# Patient Record
Sex: FEMALE | Race: WHITE | NOT HISPANIC OR LATINO | Employment: OTHER | URBAN - NONMETROPOLITAN AREA
[De-identification: names, ages, dates, MRNs, and addresses within clinical notes are randomized per-mention and may not be internally consistent; named-entity substitution may affect disease eponyms.]

---

## 2020-08-31 ENCOUNTER — OFFICE VISIT (OUTPATIENT)
Dept: CARDIOLOGY | Facility: CLINIC | Age: 75
End: 2020-08-31

## 2020-08-31 VITALS
OXYGEN SATURATION: 98 % | TEMPERATURE: 97.1 F | HEART RATE: 98 BPM | WEIGHT: 165 LBS | HEIGHT: 60 IN | BODY MASS INDEX: 32.39 KG/M2 | SYSTOLIC BLOOD PRESSURE: 143 MMHG | DIASTOLIC BLOOD PRESSURE: 87 MMHG

## 2020-08-31 DIAGNOSIS — I25.10 CORONARY ARTERY DISEASE INVOLVING NATIVE CORONARY ARTERY OF NATIVE HEART WITHOUT ANGINA PECTORIS: ICD-10-CM

## 2020-08-31 DIAGNOSIS — I34.0 MITRAL VALVE INSUFFICIENCY, UNSPECIFIED ETIOLOGY: ICD-10-CM

## 2020-08-31 DIAGNOSIS — I49.3 PVC'S (PREMATURE VENTRICULAR CONTRACTIONS): ICD-10-CM

## 2020-08-31 DIAGNOSIS — R00.2 PALPITATIONS: ICD-10-CM

## 2020-08-31 DIAGNOSIS — I10 ESSENTIAL HYPERTENSION: Primary | ICD-10-CM

## 2020-08-31 PROCEDURE — 93000 ELECTROCARDIOGRAM COMPLETE: CPT | Performed by: PHYSICIAN ASSISTANT

## 2020-08-31 PROCEDURE — 99204 OFFICE O/P NEW MOD 45 MIN: CPT | Performed by: PHYSICIAN ASSISTANT

## 2020-08-31 RX ORDER — METOPROLOL SUCCINATE 25 MG/1
25 TABLET, EXTENDED RELEASE ORAL DAILY
Qty: 30 TABLET | Refills: 11 | Status: SHIPPED | OUTPATIENT
Start: 2020-08-31 | End: 2021-04-19 | Stop reason: SDUPTHER

## 2020-08-31 RX ORDER — MELATONIN
1000 DAILY
COMMUNITY

## 2020-08-31 RX ORDER — POTASSIUM CHLORIDE 750 MG/1
1 TABLET, FILM COATED, EXTENDED RELEASE ORAL DAILY
COMMUNITY
Start: 2020-08-01

## 2020-08-31 RX ORDER — NIFEDIPINE 30 MG/1
1 TABLET, EXTENDED RELEASE ORAL DAILY
COMMUNITY
Start: 2020-08-24 | End: 2021-09-09

## 2020-08-31 RX ORDER — LORATADINE 10 MG/1
10 TABLET ORAL DAILY
COMMUNITY

## 2020-08-31 RX ORDER — ATORVASTATIN CALCIUM 20 MG/1
1 TABLET, FILM COATED ORAL DAILY
COMMUNITY
Start: 2020-08-01

## 2020-08-31 RX ORDER — MONTELUKAST SODIUM 10 MG/1
1 TABLET ORAL NIGHTLY
COMMUNITY
Start: 2020-08-24

## 2020-08-31 RX ORDER — LEVOTHYROXINE SODIUM 0.03 MG/1
1 TABLET ORAL DAILY
COMMUNITY
Start: 2020-08-01 | End: 2021-08-31

## 2020-08-31 NOTE — PROGRESS NOTES
"Problem list     Subjective   Munira Escobar is a 75 y.o. female     Chief Complaint   Patient presents with   • Establish Care     Here to re-establish care    Problem list  1.  Preserved systolic function  1.1 echocardiogram October 2019 with preserved LV function  2.  Nonobstructive coronary artery disease by catheterization March 2020 by Dr. Go with minimal irregularities and almost normal coronaries with preserved LV function  3.  Mild to moderate mild regurgitation by echocardiogram October 2019  4.  Dyslipidemia  5.  Palpitations  5.1 event monitor in 2019 with no records available  5.2 EKG performed August 31, 2020 suggest frequent PVCs  6.  Hypertension  7.  Hypothyroidism    HPI    Patient is a 75-year-old female that presents to the office for evaluation.  Patient was seen last in approximately 2016.  Work-up at that time included stress test which was normal.  She had good LV function.  Last year around October, she started having issues with palpitations and symptoms went to the hospital.  She describes having heart rates in the \"30s\".  She was symptomatic and went to the hospital and was admitted.  She was monitored overnight.  Echo was normal.    She followed up with her bracing well.  She described wearing an event monitor.  Apparently \"this was lost\".  They do not have record of it and she has been unsuccessful in getting any records from the monitor.  Patient eventually had a cardiac catheterization in March 2021 which catheterization demonstrated almost normal coronaries.    Recently she has had issues with palpitations and she wanted to transfer care to our office.  She feels a fluttering sensation.  She describes at times that her heart rate fluctuates significantly.  She does not describe any pain or pressure.  Very mild to minimal shortness of breath when trying to exert or do activity but this is a chronic symptom without any progression or change in functional status.  No PND " orthopnea.    With her palpitations, at times she can become lightheaded.  She can become dizzy.  She has not had any syncopal episodes.  Otherwise she is doing well      Current Outpatient Medications on File Prior to Visit   Medication Sig Dispense Refill   • aspirin 81 MG EC tablet Take 81 mg by mouth daily.     • atorvastatin (LIPITOR) 20 MG tablet Take 1 tablet by mouth Daily.     • cholecalciferol (VITAMIN D3) 25 MCG (1000 UT) tablet Take 1,000 Units by mouth Daily.     • folic acid (FOLVITE) 1 MG tablet Take 1 mg by mouth daily.     • lansoprazole (PREVACID) 30 MG capsule Take 1 capsule by mouth daily.     • levothyroxine (SYNTHROID, LEVOTHROID) 25 MCG tablet Take 1 tablet by mouth Daily.     • loratadine (CLARITIN) 10 MG tablet Take 10 mg by mouth Daily.     • montelukast (SINGULAIR) 10 MG tablet Take 1 tablet by mouth Every Night.     • NIFEdipine XL (PROCARDIA XL) 30 MG 24 hr tablet Take 1 tablet by mouth Daily.     • potassium chloride (K-DUR) 10 MEQ CR tablet Take 1 tablet by mouth Daily.     • cetirizine (ZyrTEC) 10 MG tablet Take 10 mg by mouth daily.     • GLEEVEC 400 MG chemo tablet Take 1 tablet by mouth daily.     • valsartan (DIOVAN) 80 MG tablet Take 1 tablet by mouth daily.       No current facility-administered medications on file prior to visit.        Morphine and related; Penicillins; and Sulfa antibiotics    Past Medical History:   Diagnosis Date   • Acid reflux    • Gout    • Hyperlipidemia    • Hypertension    • Hypothyroid    • Leukemia (CMS/HCC)    • Myocardial infarction (CMS/HCC)     Per EKG    • Vitamin D deficiency        Social History     Socioeconomic History   • Marital status:      Spouse name: Not on file   • Number of children: Not on file   • Years of education: Not on file   • Highest education level: Not on file   Tobacco Use   • Smoking status: Former Smoker     Years: 45.00     Last attempt to quit: 1974     Years since quittin.1   • Smokeless tobacco:  "Never Used   Substance and Sexual Activity   • Alcohol use: No   • Drug use: No       Family History   Problem Relation Age of Onset   • Heart attack Father    • Hypertension Father    • Hyperlipidemia Father        Review of Systems   Constitutional: Positive for fatigue (tires easily ). Negative for chills and fever.   HENT: Negative.    Eyes: Positive for visual disturbance (glasses daily ).   Respiratory: Positive for shortness of breath (with exertion ). Negative for chest tightness.    Cardiovascular: Positive for palpitations. Negative for chest pain and leg swelling.        Was told by Dr. Go that her EKG showed she had had a heart attack. Stress/Echo/Cath done by him.    Gastrointestinal: Negative.    Endocrine: Negative.  Negative for cold intolerance and heat intolerance.   Genitourinary: Negative.    Musculoskeletal: Positive for arthralgias (right knee ). Negative for back pain.   Skin: Negative.  Negative for rash and wound.   Allergic/Immunologic: Positive for environmental allergies (seasonal ). Negative for food allergies.   Neurological: Positive for dizziness (fluid behind right ear ). Negative for weakness and light-headedness.   Hematological: Negative.  Does not bruise/bleed easily.   Psychiatric/Behavioral: Negative.  Negative for sleep disturbance (denies waking with smothering ).   All other systems reviewed and are negative.      Objective   Vitals:    08/31/20 0944   BP: 143/87   BP Location: Left arm   Patient Position: Sitting   Pulse: 98   Temp: 97.1 °F (36.2 °C)   SpO2: 98%   Weight: 74.8 kg (165 lb)   Height: 152.4 cm (60\")      /87 (BP Location: Left arm, Patient Position: Sitting)   Pulse 98   Temp 97.1 °F (36.2 °C)   Ht 152.4 cm (60\")   Wt 74.8 kg (165 lb)   SpO2 98%   BMI 32.22 kg/m²     Lab Results (most recent)     None          Physical Exam   Constitutional: She is oriented to person, place, and time. She appears well-developed and well-nourished. No distress. "   HENT:   Head: Normocephalic and atraumatic.   Eyes: Conjunctivae are normal. Right eye exhibits no discharge. Left eye exhibits no discharge. No scleral icterus.   Neck: No JVD present.   Cardiovascular: Normal rate, regular rhythm and normal heart sounds. Exam reveals no gallop and no friction rub.   No murmur heard.  Pulmonary/Chest: Effort normal and breath sounds normal. No respiratory distress. She has no wheezes. She has no rales. She exhibits no tenderness.   Musculoskeletal: She exhibits no edema.   Neurological: She is alert and oriented to person, place, and time. No cranial nerve deficit.   Skin: Skin is warm and dry. No rash noted. No erythema. No pallor.   Psychiatric: She has a normal mood and affect. Her behavior is normal.   Nursing note and vitals reviewed.      Procedure     ECG 12 Lead  Date/Time: 8/31/2020 9:55 AM  Performed by: Robles Aguilar PA  Authorized by: Robles Aguilar PA   Comparison: compared with previous ECG from 5/31/2019  Comments: EKG demonstrates sinus rhythm at 85 bpm with PVCs and no acute ST changes               Assessment/Plan     Problems Addressed this Visit        Cardiovascular and Mediastinum    Palpitations    Essential hypertension - Primary    Relevant Medications    NIFEdipine XL (PROCARDIA XL) 30 MG 24 hr tablet    metoprolol succinate XL (TOPROL-XL) 25 MG 24 hr tablet    Other Relevant Orders    ECG 12 Lead    PVC's (premature ventricular contractions)    Relevant Medications    NIFEdipine XL (PROCARDIA XL) 30 MG 24 hr tablet    metoprolol succinate XL (TOPROL-XL) 25 MG 24 hr tablet    Coronary artery disease involving native coronary artery of native heart without angina pectoris    Relevant Medications    NIFEdipine XL (PROCARDIA XL) 30 MG 24 hr tablet    metoprolol succinate XL (TOPROL-XL) 25 MG 24 hr tablet    Mitral valve insufficiency    Relevant Medications    NIFEdipine XL (PROCARDIA XL) 30 MG 24 hr tablet    metoprolol succinate XL (TOPROL-XL) 25  MG 24 hr tablet            Recommendation  1.  Patient with PVCs on EKG today that were quite frequent.  I do not have results from event monitor but it appears that patient's main complaint appears to be palpitations.  I wonder if she has bigeminy at times in which her heart rate got low.  We can try to obtain event monitor results if available from previous cardiologist.  I would like to try low-dose beta-blocker therapy.  I want her to call back in 1 week.  If this fails to stop patient's arrhythmia, we may have to consider medication such as flecainide to suppress her frequent ectopy    2.  Otherwise she has good LV function.  Minimal coronary disease.  She is on appropriate medical therapy from that standpoint with no ischemic symptoms and will monitor    3.  Mild to moderate mitral valve insufficiency.  We should consider repeat echocardiogram at some point possibly next year.  She does not have any symptoms to suggest worsening valvular function    4.  Blood pressure currently controlled at this time we will continue current regimen.  For now I want to see her back for follow-up officially in 1 to 2 months.  She will call back in 1 week with symptom check will consider further evaluation and adjustment of medicine pending patient's response.  Patient is to follow with primary as scheduled         Munira Escobar  reports that she quit smoking about 46 years ago. She quit after 45.00 years of use. She has never used smokeless tobacco..       Patient's Body mass index is 32.22 kg/m². BMI is above normal parameters. Recommendations include: educational material and referral to primary care.     Advance Care Planning   ACP discussion was declined by the patient. Patient has an advance directive (not in EMR), copy requested.    Electronically signed by:

## 2020-10-06 ENCOUNTER — OFFICE VISIT (OUTPATIENT)
Dept: CARDIOLOGY | Facility: CLINIC | Age: 75
End: 2020-10-06

## 2020-10-06 VITALS
BODY MASS INDEX: 32.12 KG/M2 | HEIGHT: 60 IN | DIASTOLIC BLOOD PRESSURE: 78 MMHG | SYSTOLIC BLOOD PRESSURE: 145 MMHG | OXYGEN SATURATION: 98 % | HEART RATE: 61 BPM | WEIGHT: 163.6 LBS

## 2020-10-06 DIAGNOSIS — I49.3 PVC'S (PREMATURE VENTRICULAR CONTRACTIONS): ICD-10-CM

## 2020-10-06 DIAGNOSIS — I25.10 CORONARY ARTERY DISEASE INVOLVING NATIVE CORONARY ARTERY OF NATIVE HEART WITHOUT ANGINA PECTORIS: Primary | ICD-10-CM

## 2020-10-06 DIAGNOSIS — R00.2 PALPITATIONS: ICD-10-CM

## 2020-10-06 DIAGNOSIS — I34.0 MITRAL VALVE INSUFFICIENCY, UNSPECIFIED ETIOLOGY: ICD-10-CM

## 2020-10-06 PROCEDURE — 99213 OFFICE O/P EST LOW 20 MIN: CPT | Performed by: PHYSICIAN ASSISTANT

## 2020-10-06 NOTE — PATIENT INSTRUCTIONS
"Fat and Cholesterol Restricted Eating Plan  Getting too much fat and cholesterol in your diet may cause health problems. Choosing the right foods helps keep your fat and cholesterol at normal levels. This can keep you from getting certain diseases.  Your doctor may recommend an eating plan that includes:  · Total fat: ______% or less of total calories a day.  · Saturated fat: ______% or less of total calories a day.  · Cholesterol: less than _________mg a day.  · Fiber: ______g a day.  What are tips for following this plan?  Meal planning  · At meals, divide your plate into four equal parts:  ? Fill one-half of your plate with vegetables and green salads.  ? Fill one-fourth of your plate with whole grains.  ? Fill one-fourth of your plate with low-fat (lean) protein foods.  · Eat fish that is high in omega-3 fats at least two times a week. This includes mackerel, tuna, sardines, and salmon.  · Eat foods that are high in fiber, such as whole grains, beans, apples, broccoli, carrots, peas, and barley.  General tips    · Work with your doctor to lose weight if you need to.  · Avoid:  ? Foods with added sugar.  ? Fried foods.  ? Foods with partially hydrogenated oils.  · Limit alcohol intake to no more than 1 drink a day for nonpregnant women and 2 drinks a day for men. One drink equals 12 oz of beer, 5 oz of wine, or 1½ oz of hard liquor.  Reading food labels  · Check food labels for:  ? Trans fats.  ? Partially hydrogenated oils.  ? Saturated fat (g) in each serving.  ? Cholesterol (mg) in each serving.  ? Fiber (g) in each serving.  · Choose foods with healthy fats, such as:  ? Monounsaturated fats.  ? Polyunsaturated fats.  ? Omega-3 fats.  · Choose grain products that have whole grains. Look for the word \"whole\" as the first word in the ingredient list.  Cooking  · Cook foods using low-fat methods. These include baking, boiling, grilling, and broiling.  · Eat more home-cooked foods. Eat at restaurants and buffets " less often.  · Avoid cooking using saturated fats, such as butter, cream, palm oil, palm kernel oil, and coconut oil.  Recommended foods    Fruits  · All fresh, canned (in natural juice), or frozen fruits.  Vegetables  · Fresh or frozen vegetables (raw, steamed, roasted, or grilled). Green salads.  Grains  · Whole grains, such as whole wheat or whole grain breads, crackers, cereals, and pasta. Unsweetened oatmeal, bulgur, barley, quinoa, or brown rice. Corn or whole wheat flour tortillas.  Meats and other protein foods  · Ground beef (85% or leaner), grass-fed beef, or beef trimmed of fat. Skinless chicken or turkey. Ground chicken or turkey. Pork trimmed of fat. All fish and seafood. Egg whites. Dried beans, peas, or lentils. Unsalted nuts or seeds. Unsalted canned beans. Nut butters without added sugar or oil.  Dairy  · Low-fat or nonfat dairy products, such as skim or 1% milk, 2% or reduced-fat cheeses, low-fat and fat-free ricotta or cottage cheese, or plain low-fat and nonfat yogurt.  Fats and oils  · Tub margarine without trans fats. Light or reduced-fat mayonnaise and salad dressings. Avocado. Olive, canola, sesame, or safflower oils.  The items listed above may not be a complete list of foods and beverages you can eat. Contact a dietitian for more information.  Foods to avoid  Fruits  · Canned fruit in heavy syrup. Fruit in cream or butter sauce. Fried fruit.  Vegetables  · Vegetables cooked in cheese, cream, or butter sauce. Fried vegetables.  Grains  · White bread. White pasta. White rice. Cornbread. Bagels, pastries, and croissants. Crackers and snack foods that contain trans fat and hydrogenated oils.  Meats and other protein foods  · Fatty cuts of meat. Ribs, chicken wings, quiroz, sausage, bologna, salami, chitterlings, fatback, hot dogs, bratwurst, and packaged lunch meats. Liver and organ meats. Whole eggs and egg yolks. Chicken and turkey with skin. Fried meat.  Dairy  · Whole or 2% milk, cream,  half-and-half, and cream cheese. Whole milk cheeses. Whole-fat or sweetened yogurt. Full-fat cheeses. Nondairy creamers and whipped toppings. Processed cheese, cheese spreads, and cheese curds.  Beverages  · Alcohol. Sugar-sweetened drinks such as sodas, lemonade, and fruit drinks.  Fats and oils  · Butter, stick margarine, lard, shortening, ghee, or quiroz fat. Coconut, palm kernel, and palm oils.  Sweets and desserts  · Corn syrup, sugars, honey, and molasses. Candy. Jam and jelly. Syrup. Sweetened cereals. Cookies, pies, cakes, donuts, muffins, and ice cream.  The items listed above may not be a complete list of foods and beverages you should avoid. Contact a dietitian for more information.  Summary  · Choosing the right foods helps keep your fat and cholesterol at normal levels. This can keep you from getting certain diseases.  · At meals, fill one-half of your plate with vegetables and green salads.  · Eat high-fiber foods, like whole grains, beans, apples, carrots, peas, and barley.  · Limit added sugar, saturated fats, alcohol, and fried foods.  This information is not intended to replace advice given to you by your health care provider. Make sure you discuss any questions you have with your health care provider.  Document Released: 06/18/2013 Document Revised: 08/21/2019 Document Reviewed: 09/04/2018  [employer]vier Patient Education © 2020 [employer]  BMI for Adults    Body mass index (BMI) is a number that is calculated from a person's weight and height. BMI may help to estimate how much of a person's weight is composed of fat. BMI can help identify those who may be at higher risk for certain medical problems.  How is BMI used with adults?  BMI is used as a screening tool to identify possible weight problems. It is used to check whether a person is obese, overweight, healthy weight, or underweight.  How is BMI calculated?  BMI measures your weight and compares it to your height. This can be done either in  "English ([place] or metric measurements. Note that charts are available to help you find your BMI quickly and easily without having to do these calculations yourself.  To calculate your BMI in English ([place] measurements, your health care provider will:  1. Measure your weight in pounds (lb).  2. Multiply the number of pounds by 703.  ? For example, for a person who weighs 180 lb, multiply that number by 703, which equals 126,540.  3. Measure your height in inches (in). Then multiply that number by itself to get a measurement called \"inches squared.\"  ? For example, for a person who is 70 in tall, the \"inches squared\" measurement is 70 in x 70 in, which equals 4900 inches squared.  4. Divide the total from Step 2 (number of lb x 703) by the total from Step 3 (inches squared): 126,540 ÷ 4900 = 25.8. This is your BMI.  To calculate your BMI in metric measurements, your health care provider will:  1. Measure your weight in kilograms (kg).  2. Measure your height in meters (m). Then multiply that number by itself to get a measurement called \"meters squared.\"  ? For example, for a person who is 1.75 m tall, the \"meters squared\" measurement is 1.75 m x 1.75 m, which is equal to 3.1 meters squared.  3. Divide the number of kilograms (your weight) by the meters squared number. In this example: 70 ÷ 3.1 = 22.6. This is your BMI.  How is BMI interpreted?  To interpret your results, your health care provider will use BMI charts to identify whether you are underweight, normal weight, overweight, or obese. The following guidelines will be used:  · Underweight: BMI less than 18.5.  · Normal weight: BMI between 18.5 and 24.9.  · Overweight: BMI between 25 and 29.9.  · Obese: BMI of 30 and above.  Please note:  · Weight includes both fat and muscle, so someone with a muscular build, such as an athlete, may have a BMI that is higher than 24.9. In cases like these, BMI is not an accurate measure of body fat.  · To determine if excess " body fat is the cause of a BMI of 25 or higher, further assessments may need to be done by a health care provider.  · BMI is usually interpreted in the same way for men and women.  Why is BMI a useful tool?  BMI is useful in two ways:  · Identifying a weight problem that may be related to a medical condition, or that may increase the risk for medical problems.  · Promoting lifestyle and diet changes in order to reach a healthy weight.  Summary  · Body mass index (BMI) is a number that is calculated from a person's weight and height.  · BMI may help to estimate how much of a person's weight is composed of fat. BMI can help identify those who may be at higher risk for certain medical problems.  · BMI can be measured using English measurements or metric measurements.  · To interpret your results, your health care provider will use BMI charts to identify whether you are underweight, normal weight, overweight, or obese.  This information is not intended to replace advice given to you by your health care provider. Make sure you discuss any questions you have with your health care provider.  Document Released: 08/29/2005 Document Revised: 11/30/2018 Document Reviewed: 10/31/2018  [employer] Patient Education © 2020 [employer]  How to Quarantine at Home  Information for Patients and Families    These instructions are for people with confirmed or suspected COVID-19 who do not need to be hospitalized and those with confirmed COVID-19 who were hospitalized and discharged to care for themselves at home.    If you were tested through the Health Department  The Health Department will monitor your wellbeing.  If it is determined that you do not need to be hospitalized and can be isolated at home, you will be monitored by staff from your local or state health department.     If you were tested through a Commercial Lab  You will need to monitor yourself and report changes in your symptoms to your doctor.  See the section below  called Monitor Your Symptoms.    Follow these steps until a healthcare provider or local or state health department says you can return to your normal activities.    Stay home except to get medical care  • Restrict activities outside your home, except for getting medical care.   • Do not go to work, school, or public areas.   • Avoid using public transportation, ride-sharing, or taxis.    Separate yourself from other people and animals in your home  People  As much as possible, you should stay in a specific room and away from other people in your home. Also, you should use a separate bathroom, if available.    Animals  You should restrict contact with pets and other animals while you are sick with COVID-19, just like you would around other people. When possible, have another member of your household care for your animals while you are sick. If you are sick with COVID-19, avoid contact with your pet, including petting, snuggling, being kissed or licked, and sharing food. If you must care for your pet or be around animals while you are sick, wash your hands before and after you interact with pets and wear a facemask. See COVID-19 and Animals for more information.    Call ahead before visiting your doctor  If you have a medical appointment, call the healthcare provider and tell them that you have or may have COVID-19. This information will help the healthcare provider’s office take steps to keep other people from getting infected or exposed.    Wear a facemask  You should wear a facemask when you are around other people (e.g., sharing a room or vehicle) or pets and before you enter a healthcare provider’s office.     If you are not able to wear a facemask (for example, because it causes trouble breathing), then people who live with you should not stay in the same room with you, or they should wear a facemask if they enter your room.    Cover your coughs and sneezes  • Cover your mouth and nose with a tissue when you  cough or sneeze.   • Throw used tissues in a lined trash can.   • Immediately wash your hands with soap and water for at least 20 seconds or, if soap and water are not available, clean your hands with an alcohol-based hand  that contains at least 60% alcohol.    Clean your hands often  • Wash your hands often with soap and water for at least 20 seconds, especially after blowing your nose, coughing, or sneezing; going to the bathroom; and before eating or preparing food.     • If soap and water are not readily available, use an alcohol-based hand  with at least 60% alcohol, covering all surfaces of your hands and rubbing them together until they feel dry.    • Soap and water are the best option if hands are visibly dirty. Avoid touching your eyes, nose, and mouth with unwashed hands.    Avoid sharing personal household items  • You should not share dishes, drinking glasses, cups, eating utensils, towels, or bedding with other people or pets in your home.   • After using these items, they should be washed thoroughly with soap and water.    Clean all “high-touch” surfaces everyday  • High touch surfaces include counters, tabletops, doorknobs, bathroom fixtures, toilets, phones, keyboards, tablets, and bedside tables.   • Also, clean any surfaces that may have blood, stool, or body fluids on them.   • Use a household cleaning spray or wipe, according to the label instructions. Labels contain instructions for safe and effective use of the cleaning product, including precautions you should take when applying the product, such as wearing gloves and making sure you have good ventilation during use of the product.    Monitor your symptoms  • Seek prompt medical attention if your illness is worsening (e.g., difficulty breathing).   • Before seeking care, call your healthcare provider and tell them that you have, or are being evaluated for, COVID-19.   • Put on a facemask before you enter the facility.      • These steps will help the healthcare provider’s office to keep other people in the office or waiting room from getting infected or exposed.   • Persons who are placed under active monitoring or facilitated self-monitoring should follow instructions provided by their local health department or occupational health professionals, as appropriate.  • If you have a medical emergency and need to call 911, notify the dispatch personnel that you have, or are being evaluated for COVID-19. If possible, put on a facemask before emergency medical services arrive.    Discontinuing home isolation  Patients with confirmed COVID-19 should remain under home isolation precautions until the risk of secondary transmission to others is thought to be low. The decision to discontinue home isolation precautions should be made on a case-by-case basis, in consultation with healthcare providers and state and local health departments.    The below content are for household members, intimate partners, and caregivers of a patient with symptomatic laboratory-confirmed COVID-19 or a patient under investigation:    Household members, intimate partners, and caregivers may have close contact with a person with symptomatic, laboratory-confirmed COVID-19 or a person under investigation.     Close contacts should monitor their health; they should call their healthcare provider right away if they develop symptoms suggestive of COVID-19 (e.g., fever, cough, shortness of breath)     Close contacts should also follow these recommendations:  • Make sure that you understand and can help the patient follow their healthcare provider’s instructions for medication(s) and care. You should help the patient with basic needs in the home and provide support for getting groceries, prescriptions, and other personal needs.  • Monitor the patient’s symptoms. If the patient is getting sicker, call his or her healthcare provider and tell them that the patient has  laboratory-confirmed COVID-19. This will help the healthcare provider’s office take steps to keep other people in the office or waiting room from getting infected. Ask the healthcare provider to call the local or state health department for additional guidance. If the patient has a medical emergency and you need to call 911, notify the dispatch personnel that the patient has, or is being evaluated for COVID-19.  • Household members should stay in another room or be  from the patient as much as possible. Household members should use a separate bedroom and bathroom, if available.  • Prohibit visitors who do not have an essential need to be in the home.  • Household members should care for any pets in the home. Do not handle pets or other animals while sick.  For more information, see COVID-19 and Animals.  • Make sure that shared spaces in the home have good air flow, such as by an air conditioner or an opened window, weather permitting.  • Perform hand hygiene frequently. Wash your hands often with soap and water for at least 20 seconds or use an alcohol-based hand  that contains 60 to 95% alcohol, covering all surfaces of your hands and rubbing them together until they feel dry. Soap and water should be used preferentially if hands are visibly dirty.  • Avoid touching your eyes, nose, and mouth with unwashed hands.  • The patient should wear a facemask when you are around other people. If the patient is not able to wear a facemask (for example, because it causes trouble breathing), you, as the caregiver, should wear a mask when you are in the same room as the patient.  • Wear a disposable facemask and gloves when you touch or have contact with the patient’s blood, stool, or body fluids, such as saliva, sputum, nasal mucus, vomit, or urine.   o Throw out disposable facemasks and gloves after using them. Do not reuse.  o When removing personal protective equipment, first remove and dispose of gloves.  Then, immediately clean your hands with soap and water or alcohol-based hand . Next, remove and dispose of facemask, and immediately clean your hands again with soap and water or alcohol-based hand .  • Avoid sharing household items with the patient. You should not share dishes, drinking glasses, cups, eating utensils, towels, bedding, or other items. After the patient uses these items, you should wash them thoroughly (see below “Wash laundry thoroughly”).  • Clean all “high-touch” surfaces, such as counters, tabletops, doorknobs, bathroom fixtures, toilets, phones, keyboards, tablets, and bedside tables, every day. Also, clean any surfaces that may have blood, stool, or body fluids on them.   o Use a household cleaning spray or wipe, according to the label instructions. Labels contain instructions for safe and effective use of the cleaning product including precautions you should take when applying the product, such as wearing gloves and making sure you have good ventilation during use of the product.  • Wash laundry thoroughly.   o Immediately remove and wash clothes or bedding that have blood, stool, or body fluids on them.  o Wear disposable gloves while handling soiled items and keep soiled items away from your body. Clean your hands (with soap and water or an alcohol-based hand ) immediately after removing your gloves.  o Read and follow directions on labels of laundry or clothing items and detergent. In general, using a normal laundry detergent according to washing machine instructions and dry thoroughly using the warmest temperatures recommended on the clothing label.  • Place all used disposable gloves, facemasks, and other contaminated items in a lined container before disposing of them with other household waste. Clean your hands (with soap and water or an alcohol-based hand ) immediately after handling these items. Soap and water should be used preferentially if hands  are visibly dirty.  • Discuss any additional questions with your state or local health department or healthcare provider.    Adapted from information provided by the Centers for Disease Control and Prevention.  For more information, visit https://www.cdc.gov/coronavirus/2019-ncov/hcp/guidance-prevent-spread.html  Advance Care Planning and Advance Directives     You make decisions on a daily basis - decisions about where you want to live, your career, your home, your life. Perhaps one of the most important decisions you face is your choice for future medical care. Take time to talk with your family and your healthcare team and start planning today.  Advance Care Planning is a process that can help you:  · Understand possible future healthcare decisions in light of your own experiences  · Reflect on those decision in light of your goals and values  · Discuss your decisions with those closest to you and the healthcare professionals that care for you  · Make a plan by creating a document that reflects your wishes    Surrogate Decision Maker  In the event of a medical emergency, which has left you unable to communicate or to make your own decisions, you would need someone to make decisions for you.  It is important to discuss your preferences for medical treatment with this person while you are in good health.     Qualities of a surrogate decision maker:  • Willing to take on this role and responsibility  • Knows what you want for future medical care  • Willing to follow your wishes even if they don't agree with them  • Able to make difficult medical decisions under stressful circumstances    Advance Directives  These are legal documents you can create that will guide your healthcare team and decision maker(s) when needed. These documents can be stored in the electronic medical record.    · Living Will - a legal document to guide your care if you have a terminal condition or a serious illness and are unable to  communicate. States vary by statute in document names/types, but most forms may include one or more of the following:        -  Directions regarding life-prolonging treatments        -  Directions regarding artificially provided nutrition/hydration        -  Choosing a healthcare decision maker        -  Direction regarding organ/tissue donation    · Durable Power of  for Healthcare - this document names an -in-fact to make medical decisions for you, but it may also allow this person to make personal and financial decisions for you. Please seek the advice of an  if you need this type of document.    **Advance Directives are not required and no one may discriminate against you if you do not sign one.    Medical Orders  Many states allow specific forms/orders signed by your physician to record your wishes for medical treatment in your current state of health. This form, signed in personal communication with your physician, addresses resuscitation and other medical interventions that you may or may not want.      For more information or to schedule a time with a [facility] Advance Care Planning Facilitator contact: [email or web address] or call [phone] and someone will contact you directly.

## 2020-10-06 NOTE — PROGRESS NOTES
"Problem list     Subjective   Munira Escobar is a 75 y.o. female     Chief Complaint   Patient presents with   • Follow-up   • Hypertension   Problem list  1.  Preserved systolic function  1.1 echocardiogram October 2019 with preserved LV function  2.  Nonobstructive coronary artery disease by catheterization March 2020 by Dr. Go with minimal irregularities and almost normal coronaries with preserved LV function  3.  Mild to moderate mild regurgitation by echocardiogram October 2019  4.  Dyslipidemia  5.  Palpitations  5.1 event monitor in 2019 with no records available  5.2 EKG performed August 31, 2020 suggest frequent PVCs  6.  Hypertension  7.  Hypothyroidism    HPI    Patient is a 75-year-old female that presents to the office for follow-up.  Last office visit she establish care.  She had frequent PVCs he complained of palpitations and we placed on metoprolol.    She feels well.  She has no chest pain or pressure other than these \"sticking\" sensations on the left side of the chest.  Patient's palpitations are doing much better.  Occasional fluttering but she feels much better.  Patient with mild dyspnea.  No progressive dyspnea.  No PND orthopnea.    He has no syncopal episodes or presyncopal episodes.  Otherwise she is feeling well      Current Outpatient Medications on File Prior to Visit   Medication Sig Dispense Refill   • aspirin 81 MG EC tablet Take 81 mg by mouth daily.     • atorvastatin (LIPITOR) 20 MG tablet Take 1 tablet by mouth Daily.     • cetirizine (ZyrTEC) 10 MG tablet Take 10 mg by mouth daily.     • cholecalciferol (VITAMIN D3) 25 MCG (1000 UT) tablet Take 1,000 Units by mouth Daily.     • folic acid (FOLVITE) 1 MG tablet Take 1 mg by mouth daily.     • lansoprazole (PREVACID) 30 MG capsule Take 1 capsule by mouth daily.     • loratadine (CLARITIN) 10 MG tablet Take 10 mg by mouth Daily.     • metoprolol succinate XL (TOPROL-XL) 25 MG 24 hr tablet Take 1 tablet by mouth Daily. 30 tablet 11   • " "montelukast (SINGULAIR) 10 MG tablet Take 1 tablet by mouth Every Night.     • NIFEdipine XL (PROCARDIA XL) 30 MG 24 hr tablet Take 1 tablet by mouth Daily.     • potassium chloride (K-DUR) 10 MEQ CR tablet Take 1 tablet by mouth Daily.     • valsartan (DIOVAN) 80 MG tablet Take 1 tablet by mouth daily.     • GLEEVEC 400 MG chemo tablet Take 1 tablet by mouth daily.     • levothyroxine (SYNTHROID, LEVOTHROID) 25 MCG tablet Take 1 tablet by mouth Daily.       No current facility-administered medications on file prior to visit.        Morphine and related, Penicillins, and Sulfa antibiotics    Past Medical History:   Diagnosis Date   • Acid reflux    • Gout    • Hyperlipidemia    • Hypertension    • Hypothyroid    • Leukemia (CMS/HCC)    • Myocardial infarction (CMS/HCC)     Per EKG    • Vitamin D deficiency        Social History     Socioeconomic History   • Marital status:      Spouse name: Not on file   • Number of children: Not on file   • Years of education: Not on file   • Highest education level: Not on file   Tobacco Use   • Smoking status: Former Smoker     Years: 45.00     Quit date: 1974     Years since quittin.2   • Smokeless tobacco: Never Used   Substance and Sexual Activity   • Alcohol use: No   • Drug use: No       Family History   Problem Relation Age of Onset   • Heart attack Father    • Hypertension Father    • Hyperlipidemia Father        Review of Systems   HENT: Negative.    Eyes: Positive for visual disturbance (States sometimes when her heart beats, she sees a flash in front of her eyes.).   Respiratory: Positive for shortness of breath (w/ walking ). Negative for chest tightness.    Cardiovascular: Positive for chest pain, palpitations (Flutters sometimes ) and leg swelling (BLE edema in feet- \"just a little bit.\" Goes down overnight. ).   Endocrine: Positive for cold intolerance (Usually cold ). Negative for heat intolerance.   Musculoskeletal: Positive for arthralgias. " "Negative for back pain and neck pain.   Skin: Negative.    Allergic/Immunologic: Positive for environmental allergies. Negative for food allergies.   Neurological: Positive for weakness (Legs ) and light-headedness (Sometimes, if she stands up too fast ). Negative for dizziness, syncope, numbness and headaches.   Hematological: Bruises/bleeds easily (Bruises).   Psychiatric/Behavioral: Negative.    All other systems reviewed and are negative.      Objective   Vitals:    10/06/20 0847   BP: 145/78   Pulse: 61   SpO2: 98%   Weight: 74.2 kg (163 lb 9.6 oz)   Height: 152.4 cm (60\")      /78   Pulse 61   Ht 152.4 cm (60\")   Wt 74.2 kg (163 lb 9.6 oz)   SpO2 98%   BMI 31.95 kg/m²     Lab Results (most recent)     None          Physical Exam  Vitals signs and nursing note reviewed.   Constitutional:       General: She is not in acute distress.     Appearance: Normal appearance. She is well-developed.   HENT:      Head: Normocephalic and atraumatic.   Eyes:      General: No scleral icterus.        Right eye: No discharge.         Left eye: No discharge.      Conjunctiva/sclera: Conjunctivae normal.   Neck:      Vascular: No carotid bruit.   Cardiovascular:      Rate and Rhythm: Normal rate and regular rhythm.      Heart sounds: Normal heart sounds. No murmur. No friction rub. No gallop.    Pulmonary:      Effort: Pulmonary effort is normal. No respiratory distress.      Breath sounds: Normal breath sounds. No wheezing or rales.   Chest:      Chest wall: No tenderness.   Musculoskeletal:      Right lower leg: No edema.      Left lower leg: No edema.   Skin:     General: Skin is warm and dry.      Coloration: Skin is not pale.      Findings: No erythema or rash.   Neurological:      Mental Status: She is alert and oriented to person, place, and time.      Cranial Nerves: No cranial nerve deficit.   Psychiatric:         Behavior: Behavior normal.         Procedure   Procedures       Assessment/Plan     Problems " Addressed this Visit        Cardiovascular and Mediastinum    Palpitations    PVC's (premature ventricular contractions)    Coronary artery disease involving native coronary artery of native heart without angina pectoris - Primary    Mitral valve insufficiency      Diagnoses       Codes Comments    Coronary artery disease involving native coronary artery of native heart without angina pectoris    -  Primary ICD-10-CM: I25.10  ICD-9-CM: 414.01     Palpitations     ICD-10-CM: R00.2  ICD-9-CM: 785.1     Mitral valve insufficiency, unspecified etiology     ICD-10-CM: I34.0  ICD-9-CM: 424.0     PVC's (premature ventricular contractions)     ICD-10-CM: I49.3  ICD-9-CM: 427.69           Recommendation  1.  Patient with coronary disease but moderate disease by catheterization.  Nothing further from our standpoint.  Chest pain is atypical and I would consider musculoskeletal causes    2.  Patient with palpitations but doing much better with beta-blocker therapy and PVCs have improved    3.  Patient with mild to moderate mitral valve insufficiency.  We will continue to monitor    4.  We will see patient back for follow-up in 6 months.  Follow-up with primary as scheduled           Munira Escobar  reports that she quit smoking about 46 years ago. She quit after 45.00 years of use. She has never used smokeless tobacco.     Patient's Body mass index is 31.95 kg/m². BMI is above normal parameters. Recommendations include: educational material.       Electronically signed by:

## 2021-04-19 ENCOUNTER — OFFICE VISIT (OUTPATIENT)
Dept: CARDIOLOGY | Facility: CLINIC | Age: 76
End: 2021-04-19

## 2021-04-19 VITALS
SYSTOLIC BLOOD PRESSURE: 144 MMHG | OXYGEN SATURATION: 98 % | BODY MASS INDEX: 32.08 KG/M2 | HEART RATE: 74 BPM | DIASTOLIC BLOOD PRESSURE: 83 MMHG | HEIGHT: 60 IN | WEIGHT: 163.4 LBS

## 2021-04-19 DIAGNOSIS — R00.2 PALPITATIONS: Primary | ICD-10-CM

## 2021-04-19 DIAGNOSIS — R06.02 SHORTNESS OF BREATH: ICD-10-CM

## 2021-04-19 DIAGNOSIS — I25.10 CORONARY ARTERY DISEASE INVOLVING NATIVE CORONARY ARTERY OF NATIVE HEART WITHOUT ANGINA PECTORIS: ICD-10-CM

## 2021-04-19 DIAGNOSIS — I10 ESSENTIAL HYPERTENSION: ICD-10-CM

## 2021-04-19 PROCEDURE — 93000 ELECTROCARDIOGRAM COMPLETE: CPT | Performed by: PHYSICIAN ASSISTANT

## 2021-04-19 PROCEDURE — 99213 OFFICE O/P EST LOW 20 MIN: CPT | Performed by: PHYSICIAN ASSISTANT

## 2021-04-19 RX ORDER — MECLIZINE HCL 12.5 MG/1
TABLET ORAL AS NEEDED
COMMUNITY
Start: 2021-03-30

## 2021-04-19 RX ORDER — METOPROLOL SUCCINATE 25 MG/1
25 TABLET, EXTENDED RELEASE ORAL DAILY
Qty: 90 TABLET | Refills: 3 | Status: SHIPPED | OUTPATIENT
Start: 2021-04-19 | End: 2021-08-31

## 2021-04-19 NOTE — PROGRESS NOTES
Problem list     Subjective   Munira Escobar is a 75 y.o. female     Chief Complaint   Patient presents with   • Palpitations     presents for 6 month f/u   • Shortness of Breath   • Coronary Artery Disease   Problem list  1.  Preserved systolic function  1.1 echocardiogram October 2019 with preserved LV function  2.  Nonobstructive coronary artery disease by catheterization March 2020 by Dr. Go with minimal irregularities and almost normal coronaries with preserved LV function  3.  Mild to moderate mild regurgitation by echocardiogram October 2019  4.  Dyslipidemia  5.  Palpitations  5.1 event monitor in 2019 with no records available  5.2 EKG performed August 31, 2020 suggest frequent PVCs  6.  Hypertension  7.  Hypothyroidism       HPI    Patient is a 75-year-old female that presents back to the office for follow-up.    She has done remarkably well since her last visit.  She does not complain of pain or pressure but she does have stable levels of exertional dyspnea.  This is a chronic shortness of breath.  She does not describe PND orthopnea.    Patient does not describe palpitating but on occasion.  She has history of PVCs in the past.  She does not describe any strokelike symptoms.  She does not describe dizziness presyncope or syncope.  Otherwise is doing well      Current Outpatient Medications on File Prior to Visit   Medication Sig Dispense Refill   • aspirin 81 MG EC tablet Take 81 mg by mouth daily.     • atorvastatin (LIPITOR) 20 MG tablet Take 1 tablet by mouth Daily.     • cetirizine (ZyrTEC) 10 MG tablet Take 10 mg by mouth daily.     • cholecalciferol (VITAMIN D3) 25 MCG (1000 UT) tablet Take 1,000 Units by mouth Daily.     • folic acid (FOLVITE) 1 MG tablet Take 1 mg by mouth daily.     • lansoprazole (PREVACID) 30 MG capsule Take 1 capsule by mouth daily.     • levothyroxine (SYNTHROID, LEVOTHROID) 25 MCG tablet Take 1 tablet by mouth Daily.     • loratadine (CLARITIN) 10 MG tablet Take 10 mg by  mouth Daily.     • montelukast (SINGULAIR) 10 MG tablet Take 1 tablet by mouth Every Night.     • NIFEdipine XL (PROCARDIA XL) 30 MG 24 hr tablet Take 1 tablet by mouth Daily.     • potassium chloride (K-DUR) 10 MEQ CR tablet Take 1 tablet by mouth Daily.     • valsartan (DIOVAN) 80 MG tablet Take 1 tablet by mouth daily.     • [DISCONTINUED] metoprolol succinate XL (TOPROL-XL) 25 MG 24 hr tablet Take 1 tablet by mouth Daily. 30 tablet 11   • meclizine (ANTIVERT) 12.5 MG tablet As Needed.     • [DISCONTINUED] GLEEVEC 400 MG chemo tablet Take 1 tablet by mouth daily.       No current facility-administered medications on file prior to visit.       Morphine and related, Penicillins, and Sulfa antibiotics    Past Medical History:   Diagnosis Date   • Acid reflux    • Gout    • Hyperlipidemia    • Hypertension    • Hypothyroid    • Leukemia (CMS/HCC)    • Myocardial infarction (CMS/HCC)     Per EKG    • Vitamin D deficiency        Social History     Socioeconomic History   • Marital status:      Spouse name: Not on file   • Number of children: Not on file   • Years of education: Not on file   • Highest education level: Not on file   Tobacco Use   • Smoking status: Former Smoker     Years: 45.00     Quit date: 1974     Years since quittin.8   • Smokeless tobacco: Never Used   Substance and Sexual Activity   • Alcohol use: No   • Drug use: No       Family History   Problem Relation Age of Onset   • Heart attack Father    • Hypertension Father    • Hyperlipidemia Father        Review of Systems   Constitutional: Positive for fatigue. Negative for chills, diaphoresis and fever.   HENT: Negative.    Eyes: Positive for visual disturbance.   Respiratory: Positive for shortness of breath (with increased activity). Negative for apnea, cough, chest tightness and wheezing.    Cardiovascular: Positive for palpitations (flutters, better since Metoprolol) and leg swelling (mild). Negative for chest pain.    "  Gastrointestinal: Negative.    Endocrine: Negative.    Genitourinary: Negative.    Musculoskeletal: Negative.    Skin: Negative.    Allergic/Immunologic: Positive for environmental allergies.   Neurological: Positive for dizziness (vertigo), weakness and numbness. Negative for syncope, light-headedness and headaches.   Hematological: Negative.  Does not bruise/bleed easily.   Psychiatric/Behavioral: Positive for agitation. The patient is nervous/anxious.        Objective   Vitals:    04/19/21 1343   BP: 144/83   BP Location: Left arm   Patient Position: Sitting   Pulse: 74   SpO2: 98%   Weight: 74.1 kg (163 lb 6.4 oz)   Height: 152.4 cm (60\")      /83 (BP Location: Left arm, Patient Position: Sitting)   Pulse 74   Ht 152.4 cm (60\")   Wt 74.1 kg (163 lb 6.4 oz)   SpO2 98%   BMI 31.91 kg/m²     Lab Results (most recent)     None          Physical Exam  Vitals and nursing note reviewed.   Constitutional:       General: She is not in acute distress.     Appearance: Normal appearance. She is well-developed.   HENT:      Head: Normocephalic and atraumatic.   Eyes:      General: No scleral icterus.        Right eye: No discharge.         Left eye: No discharge.      Conjunctiva/sclera: Conjunctivae normal.   Neck:      Vascular: No carotid bruit.   Cardiovascular:      Rate and Rhythm: Normal rate and regular rhythm.      Heart sounds: Normal heart sounds. No murmur heard.   No friction rub. No gallop.    Pulmonary:      Effort: Pulmonary effort is normal. No respiratory distress.      Breath sounds: Normal breath sounds. No wheezing or rales.   Chest:      Chest wall: No tenderness.   Musculoskeletal:      Right lower leg: No edema.      Left lower leg: No edema.   Skin:     General: Skin is warm and dry.      Coloration: Skin is not pale.      Findings: No erythema or rash.   Neurological:      Mental Status: She is alert and oriented to person, place, and time.      Cranial Nerves: No cranial nerve deficit. "   Psychiatric:         Behavior: Behavior normal.         Procedure     ECG 12 Lead    Date/Time: 4/19/2021 1:54 PM  Performed by: Robles Aguilar PA  Authorized by: Robles Aguilar PA   Comparison: compared with previous ECG from 8/31/2020  Comments: EKG demonstrates sinus rhythm at 63 bpm otherwise normal               Assessment/Plan     Problems Addressed this Visit        Cardiac and Vasculature    Palpitations - Primary    Relevant Orders    ECG 12 Lead    Essential hypertension    Relevant Medications    metoprolol succinate XL (TOPROL-XL) 25 MG 24 hr tablet    Other Relevant Orders    ECG 12 Lead    Coronary artery disease involving native coronary artery of native heart without angina pectoris    Relevant Medications    metoprolol succinate XL (TOPROL-XL) 25 MG 24 hr tablet    Other Relevant Orders    ECG 12 Lead       Pulmonary and Pneumonias    Shortness of breath    Relevant Orders    ECG 12 Lead      Diagnoses       Codes Comments    Palpitations    -  Primary ICD-10-CM: R00.2  ICD-9-CM: 785.1     Essential hypertension     ICD-10-CM: I10  ICD-9-CM: 401.9     Shortness of breath     ICD-10-CM: R06.02  ICD-9-CM: 786.05     Coronary artery disease involving native coronary artery of native heart without angina pectoris     ICD-10-CM: I25.10  ICD-9-CM: 414.01           Recommendation  1.  Patient is a 75-year-old female doing remarkably well at this time.  She has history of PVCs but is doing well.  She has no complaints of any strokelike symptoms.  For now we will monitor    2.  Baseline hypertension doing well on current medical therapy will continue    3.  Patient with history of coronary disease with no ischemic symptoms at this point.  Mild stable levels of dyspnea.  For now we will continue medical therapy.  We will see her back for follow-up in 6 months or sooner as symptoms discussed.  Follow-up with primary as scheduled         Munira Escobar  reports that she quit smoking about 46 years ago. She  quit after 45.00 years of use. She has never used smokeless tobacco..       Patient's Body mass index is 31.91 kg/m². BMI is above normal parameters. Recommendations include: educational material.     Advance Care Planning   ACP discussion was held with the patient during this visit. Patient does not have an advance directive, declines further assistance.     Electronically signed by:

## 2021-04-19 NOTE — PATIENT INSTRUCTIONS

## 2021-08-30 ENCOUNTER — TELEPHONE (OUTPATIENT)
Dept: CARDIOLOGY | Facility: CLINIC | Age: 76
End: 2021-08-30

## 2021-08-30 NOTE — TELEPHONE ENCOUNTER
Pt LVM on the triage line reporting she is experiencing bradycardia.  The PM returned the pt's call and was informed pt is experiencing bradycardia, HR 30s in the mornings and a cold sweat when she wakes up.  This has been occurring for ~2 weeks.  The PM discussed with Robles and was instructed to have the pt to come in @ 10am in the morning.  Pt was notified and in agreement.

## 2021-08-31 ENCOUNTER — OFFICE VISIT (OUTPATIENT)
Dept: CARDIOLOGY | Facility: CLINIC | Age: 76
End: 2021-08-31

## 2021-08-31 VITALS
WEIGHT: 158 LBS | SYSTOLIC BLOOD PRESSURE: 125 MMHG | HEART RATE: 58 BPM | DIASTOLIC BLOOD PRESSURE: 78 MMHG | OXYGEN SATURATION: 97 % | HEIGHT: 60 IN | BODY MASS INDEX: 31.02 KG/M2

## 2021-08-31 DIAGNOSIS — R00.1 BRADYCARDIA, SINUS: ICD-10-CM

## 2021-08-31 DIAGNOSIS — R00.2 PALPITATIONS: ICD-10-CM

## 2021-08-31 DIAGNOSIS — R06.02 SHORTNESS OF BREATH: Primary | ICD-10-CM

## 2021-08-31 PROCEDURE — 93000 ELECTROCARDIOGRAM COMPLETE: CPT | Performed by: PHYSICIAN ASSISTANT

## 2021-08-31 PROCEDURE — 99214 OFFICE O/P EST MOD 30 MIN: CPT | Performed by: PHYSICIAN ASSISTANT

## 2021-08-31 RX ORDER — NITROFURANTOIN MACROCRYSTALS 100 MG/1
100 CAPSULE ORAL 2 TIMES DAILY
COMMUNITY
End: 2022-04-19

## 2021-08-31 NOTE — PATIENT INSTRUCTIONS

## 2021-08-31 NOTE — PROGRESS NOTES
Problem list     Subjective   Munira Escobar is a 76 y.o. female     Chief Complaint   Patient presents with   • Follow-up     patient reports bradycardia   Problem list  1.  Preserved systolic function  1.1 echocardiogram October 2019 with preserved LV function  2.  Nonobstructive coronary artery disease by catheterization March 2020 by Dr. Go with minimal irregularities and almost normal coronaries with preserved LV function  3.  Mild to moderate mild regurgitation by echocardiogram October 2019  4.  Dyslipidemia  5.  Palpitations  5.1 event monitor in 2019 with no records available  5.2 EKG performed August 31, 2020 suggest frequent PVCs  6.  Hypertension  7.  Hypothyroidism  8. Leukemia followed by [facility] currently in remission    HPI    Patient is a 76-year-old female who presents to the office for evaluation. Recently, she has had issues in regards to her heart rate. She has noticed that being significantly low and describes waking up at night and being significantly low. Even today, it is 48 bpm. She was placed on beta-blocker therapy in the past because of frequent PVCs that she experienced which did help but she has been experiencing more shortness of breath and has had significant bradycardia. She does not describe presyncope or syncope. She does not describe any dizziness but has had some fatigue    She does not describe any chest pain or pressure but her dyspnea is concerning. She does not describe PND orthopnea.    Otherwise she appears stable today    Current Outpatient Medications on File Prior to Visit   Medication Sig Dispense Refill   • aspirin 81 MG EC tablet Take 81 mg by mouth daily.     • atorvastatin (LIPITOR) 20 MG tablet Take 1 tablet by mouth Daily.     • cetirizine (ZyrTEC) 10 MG tablet Take 10 mg by mouth daily.     • cholecalciferol (VITAMIN D3) 25 MCG (1000 UT) tablet Take 1,000 Units by mouth Daily.     • folic acid (FOLVITE) 1 MG tablet Take 1 mg by mouth daily.     •  lansoprazole (PREVACID) 30 MG capsule Take 1 capsule by mouth daily.     • levothyroxine (SYNTHROID, LEVOTHROID) 25 MCG tablet Take 1 tablet by mouth Daily.     • loratadine (CLARITIN) 10 MG tablet Take 10 mg by mouth Daily.     • meclizine (ANTIVERT) 12.5 MG tablet As Needed.     • montelukast (SINGULAIR) 10 MG tablet Take 1 tablet by mouth Every Night.     • NIFEdipine XL (PROCARDIA XL) 30 MG 24 hr tablet Take 1 tablet by mouth Daily.     • nitrofurantoin (MACRODANTIN) 100 MG capsule Take 100 mg by mouth 2 (two) times a day. X 10 days     • potassium chloride (K-DUR) 10 MEQ CR tablet Take 1 tablet by mouth Daily.     • valsartan (DIOVAN) 80 MG tablet Take 1 tablet by mouth daily.     • [DISCONTINUED] metoprolol succinate XL (TOPROL-XL) 25 MG 24 hr tablet Take 1 tablet by mouth Daily. 90 tablet 3     No current facility-administered medications on file prior to visit.       Morphine and related, Penicillins, and Sulfa antibiotics    Past Medical History:   Diagnosis Date   • Acid reflux    • Gout    • History of leukemia    • Hyperlipidemia    • Hypertension    • Hypothyroid    • Leukemia (CMS/HCC)     history of leukemia   • Myocardial infarction (CMS/HCC)     Per EKG    • Vitamin D deficiency        Social History     Socioeconomic History   • Marital status:      Spouse name: Not on file   • Number of children: Not on file   • Years of education: Not on file   • Highest education level: Not on file   Tobacco Use   • Smoking status: Former Smoker     Years: 45.00     Quit date: 1974     Years since quittin.1   • Smokeless tobacco: Never Used   Substance and Sexual Activity   • Alcohol use: No   • Drug use: No       Family History   Problem Relation Age of Onset   • Heart attack Father    • Hypertension Father    • Hyperlipidemia Father        Review of Systems   Constitutional: Positive for diaphoresis (at hs). Negative for chills and fever.   HENT: Negative.  Negative for congestion, sinus  "pressure and sore throat.    Eyes: Positive for visual disturbance (glasses).   Respiratory: Positive for shortness of breath. Negative for chest tightness.    Cardiovascular: Negative.  Negative for chest pain, palpitations and leg swelling.   Gastrointestinal: Negative.  Negative for abdominal pain, blood in stool, constipation, diarrhea, nausea and vomiting.   Endocrine: Negative.  Negative for cold intolerance and heat intolerance.   Genitourinary: Negative.  Negative for dysuria, frequency, hematuria and urgency.   Musculoskeletal: Negative.  Negative for arthralgias, back pain and neck pain.   Skin: Negative.  Negative for rash and wound.   Allergic/Immunologic: Negative.  Negative for environmental allergies and food allergies.   Neurological: Positive for dizziness (when pulse decreased) and numbness (bottom of feet). Negative for syncope and light-headedness.   Hematological: Bruises/bleeds easily.   Psychiatric/Behavioral: Positive for sleep disturbance (wakes with soa , denies cp).       Objective   Vitals:    08/31/21 0959   BP: 125/78   BP Location: Left arm   Patient Position: Sitting   Pulse: 58   SpO2: 97%   Weight: 71.7 kg (158 lb)   Height: 152.4 cm (60\")      /78 (BP Location: Left arm, Patient Position: Sitting)   Pulse 58   Ht 152.4 cm (60\")   Wt 71.7 kg (158 lb)   SpO2 97%   BMI 30.86 kg/m²     Lab Results (most recent)     None          Physical Exam  Vitals and nursing note reviewed.   Constitutional:       General: She is not in acute distress.     Appearance: Normal appearance. She is well-developed.   HENT:      Head: Normocephalic and atraumatic.   Eyes:      General: No scleral icterus.        Right eye: No discharge.         Left eye: No discharge.      Conjunctiva/sclera: Conjunctivae normal.   Neck:      Vascular: No carotid bruit.   Cardiovascular:      Rate and Rhythm: Normal rate and regular rhythm.      Heart sounds: Normal heart sounds. No murmur heard.   No friction " rub. No gallop.    Pulmonary:      Effort: Pulmonary effort is normal. No respiratory distress.      Breath sounds: Normal breath sounds. No wheezing or rales.   Chest:      Chest wall: No tenderness.   Musculoskeletal:      Right lower leg: No edema.      Left lower leg: No edema.   Skin:     General: Skin is warm and dry.      Coloration: Skin is not pale.      Findings: No erythema or rash.   Neurological:      Mental Status: She is alert and oriented to person, place, and time.      Cranial Nerves: No cranial nerve deficit.   Psychiatric:         Behavior: Behavior normal.         Procedure     ECG 12 Lead    Date/Time: 8/31/2021 10:05 AM  Performed by: Robles Aguilar PA  Authorized by: Robles Aguilar PA   Comparison: compared with previous ECG from 4/19/2021  Comments: EKG demonstrates sinus bradycardia at 48 bpm with nonspecific T wave changes at baseline               Assessment/Plan     Problems Addressed this Visit        Cardiac and Vasculature    Palpitations    Bradycardia, sinus       Pulmonary and Pneumonias    Shortness of breath - Primary      Diagnoses       Codes Comments    Shortness of breath    -  Primary ICD-10-CM: R06.02  ICD-9-CM: 786.05     Palpitations     ICD-10-CM: R00.2  ICD-9-CM: 785.1     Bradycardia, sinus     ICD-10-CM: R00.1  ICD-9-CM: 427.89             Recommendation  1. Patient is a 76-year-old female that presents to the office for evaluation that has had significant bradycardia and has history of palpitations. I would like to stop her metoprolol because of her heart rate being currently 48 bpm. I want her to call back in 1 week with symptom check in regards to her overall clinical state.    2. We discussed testing. She is not interested at this time. She would rather stop medication and see how she does and then consider evaluation.    3. Patient with mild levels of dyspnea. With history of cancer, did discuss testing to include echocardiogram. For now she would like to  wait. I want to see her back for follow-up in a few months. I want her to call back in 1 week and she acknowledges. If she call back and still has symptoms, will consider event monitoring and at the least echocardiogram.    4. For now we will see patient back for follow-up as scheduled. Follow-up with primary as scheduled       Patient brought in medicine list to appointment, it's been reviewed with patient and med list was updated in the chart.     Advance Care Planning   ACP discussion was held with the patient during this visit. Patient does not have an advance directive, declines further assistance.         Electronically signed by:

## 2021-09-07 ENCOUNTER — TELEPHONE (OUTPATIENT)
Dept: CARDIOLOGY | Facility: CLINIC | Age: 76
End: 2021-09-07

## 2021-09-07 NOTE — TELEPHONE ENCOUNTER
Attempted to reach patient, left VM to call office.   Patient called stating Robles MATAMOROS wanted to speak with her. Per Robles he was having patient to call with symptoms one week of having EKG. Lila Lainez LPN

## 2021-09-08 DIAGNOSIS — R00.2 PALPITATIONS: Primary | ICD-10-CM

## 2021-09-08 DIAGNOSIS — I49.3 PVC'S (PREMATURE VENTRICULAR CONTRACTIONS): ICD-10-CM

## 2021-09-08 DIAGNOSIS — R00.1 BRADYCARDIA, SINUS: ICD-10-CM

## 2021-09-08 NOTE — TELEPHONE ENCOUNTER
Spoke with patients she states when getting up in the mornings pulse 50-52, when she starts moving around pulse increases, reports no longer having fluttering. Reports her main issue is shortness of breath. Per Robles patient to have echo, stress, and wear monitor. Patient stated she would have echo and wear monitor, does not want to do stress test at this time. Order for monitor and echo put in. Lila Lainez LPN

## 2021-09-09 ENCOUNTER — OFFICE VISIT (OUTPATIENT)
Dept: CARDIOLOGY | Facility: CLINIC | Age: 76
End: 2021-09-09

## 2021-09-09 VITALS
OXYGEN SATURATION: 98 % | HEART RATE: 80 BPM | BODY MASS INDEX: 30.43 KG/M2 | DIASTOLIC BLOOD PRESSURE: 92 MMHG | SYSTOLIC BLOOD PRESSURE: 151 MMHG | HEIGHT: 60 IN | WEIGHT: 155 LBS

## 2021-09-09 DIAGNOSIS — R00.1 BRADYCARDIA, SINUS: Primary | ICD-10-CM

## 2021-09-09 PROCEDURE — 99211 OFF/OP EST MAY X REQ PHY/QHP: CPT | Performed by: PHYSICIAN ASSISTANT

## 2021-09-09 RX ORDER — VALSARTAN 160 MG/1
160 TABLET ORAL DAILY
Qty: 30 TABLET | Refills: 5 | Status: SHIPPED | OUTPATIENT
Start: 2021-09-09 | End: 2021-09-15 | Stop reason: SDUPTHER

## 2021-09-15 ENCOUNTER — TELEPHONE (OUTPATIENT)
Dept: CARDIOLOGY | Facility: CLINIC | Age: 76
End: 2021-09-15

## 2021-09-15 DIAGNOSIS — I10 ESSENTIAL HYPERTENSION: Primary | ICD-10-CM

## 2021-09-15 RX ORDER — AMLODIPINE BESYLATE 5 MG/1
5 TABLET ORAL DAILY
Qty: 30 TABLET | Refills: 11 | Status: SHIPPED | OUTPATIENT
Start: 2021-09-15 | End: 2021-10-19 | Stop reason: SDDI

## 2021-09-21 ENCOUNTER — TELEPHONE (OUTPATIENT)
Dept: CARDIOLOGY | Facility: CLINIC | Age: 76
End: 2021-09-21

## 2021-09-21 NOTE — TELEPHONE ENCOUNTER
Just monitor symptoms.  Obviously, she has had bradycardia in the past and without evaluating her rhythm and right but some type of monitor, we cannot fully treat the patient.  Can you let her know that.  Is there any other type of monitor we can use that she can tolerate

## 2021-09-21 NOTE — TELEPHONE ENCOUNTER
Received notice from Cathie unable to get monitor hooked up. I contacted patient she stated that she only put on the monitor strip to see if she would break out like before. Per patient she has tried sensitive strips and regular strips and she got irritated again and sent the monitor back 9/20/2021. Cathie has cancelled the monitor. Per the patient she can not wear them.

## 2021-09-22 NOTE — TELEPHONE ENCOUNTER
Spoke with patients  this morning, asked that she call office back. Stated he would have her call when she gets up. Lila Lainez LPN

## 2021-09-24 NOTE — PROGRESS NOTES
Munira Escobar  1945 9/24/2021   ?   Chief Complaint   Patient presents with   • Nurse Visit     Dizziness, SOA      ?   HPI:   ?Patient presents in office for symptoms of dizziness and shortness of air.    ?   ?    Current Outpatient Medications:   •  aspirin 81 MG EC tablet, Take 81 mg by mouth daily., Disp: , Rfl:   •  atorvastatin (LIPITOR) 20 MG tablet, Take 1 tablet by mouth Daily., Disp: , Rfl:   •  cetirizine (ZyrTEC) 10 MG tablet, Take 10 mg by mouth daily., Disp: , Rfl:   •  cholecalciferol (VITAMIN D3) 25 MCG (1000 UT) tablet, Take 1,000 Units by mouth Daily., Disp: , Rfl:   •  folic acid (FOLVITE) 1 MG tablet, Take 1 mg by mouth daily., Disp: , Rfl:   •  lansoprazole (PREVACID) 30 MG capsule, Take 1 capsule by mouth daily., Disp: , Rfl:   •  meclizine (ANTIVERT) 12.5 MG tablet, As Needed., Disp: , Rfl:   •  montelukast (SINGULAIR) 10 MG tablet, Take 1 tablet by mouth Every Night., Disp: , Rfl:   •  potassium chloride (K-DUR) 10 MEQ CR tablet, Take 1 tablet by mouth Daily., Disp: , Rfl:   •  amLODIPine (NORVASC) 5 MG tablet, Take 1 tablet by mouth Daily., Disp: 30 tablet, Rfl: 11  •  levothyroxine (SYNTHROID, LEVOTHROID) 25 MCG tablet, Take 1 tablet by mouth Daily., Disp: , Rfl:   •  loratadine (CLARITIN) 10 MG tablet, Take 10 mg by mouth Daily., Disp: , Rfl:   •  nitrofurantoin (MACRODANTIN) 100 MG capsule, Take 100 mg by mouth 2 (two) times a day. X 10 days, Disp: , Rfl:    ?   ?   Morphine and related, Penicillins, and Sulfa antibiotics       Procedures     ?   Assessment/Plan    ?   ? 1. Dizziness        2. Shortness of air    Per Robles MATAMOROS patient to discontinue Nifedipine, start Valsartan. Patient is to wear cardiac event monitor. Continue to monitor blood pressure and pulse. Patient verbalized understanding. Lila Lainez LPN        ?

## 2021-10-19 ENCOUNTER — OFFICE VISIT (OUTPATIENT)
Dept: CARDIOLOGY | Facility: CLINIC | Age: 76
End: 2021-10-19

## 2021-10-19 VITALS
HEART RATE: 56 BPM | BODY MASS INDEX: 30.63 KG/M2 | WEIGHT: 156 LBS | HEIGHT: 60 IN | SYSTOLIC BLOOD PRESSURE: 120 MMHG | OXYGEN SATURATION: 98 % | DIASTOLIC BLOOD PRESSURE: 73 MMHG

## 2021-10-19 DIAGNOSIS — I34.0 MITRAL VALVE INSUFFICIENCY, UNSPECIFIED ETIOLOGY: ICD-10-CM

## 2021-10-19 DIAGNOSIS — I10 ESSENTIAL HYPERTENSION: ICD-10-CM

## 2021-10-19 DIAGNOSIS — I25.10 CORONARY ARTERY DISEASE INVOLVING NATIVE CORONARY ARTERY OF NATIVE HEART WITHOUT ANGINA PECTORIS: Primary | ICD-10-CM

## 2021-10-19 PROCEDURE — 99213 OFFICE O/P EST LOW 20 MIN: CPT | Performed by: PHYSICIAN ASSISTANT

## 2021-10-19 RX ORDER — VALSARTAN 160 MG/1
160 TABLET ORAL DAILY
COMMUNITY
End: 2022-03-09

## 2021-10-19 RX ORDER — NIFEDIPINE 30 MG/1
30 TABLET, EXTENDED RELEASE ORAL DAILY
COMMUNITY
End: 2021-10-19

## 2021-10-19 NOTE — PROGRESS NOTES
Problem list     Subjective   Munira Escobar is a 76 y.o. female     Chief Complaint   Patient presents with   • Slow Heart Rate   • Palpitations   Problem list  1.  Preserved systolic function  1.1 echocardiogram October 2019 with preserved LV function  2.  Nonobstructive coronary artery disease by catheterization March 2020 by Dr. Go with minimal irregularities and almost normal coronaries with preserved LV function  3.  Mild to moderate mild regurgitation by echocardiogram October 2019  4.  Dyslipidemia  5.  Palpitations  5.1 event monitor in 2019 with no records available  5.2 EKG performed August 31, 2020 suggest frequent PVCs  6.  Hypertension  7.  Hypothyroidism  8. Leukemia followed by [facility] [place] currently in remission    HPI    Patient is a 76-year-old female who presents back to the office for routine follow-up.  She has done remarkably well since her last visit.  She does not complain of any chest pain or pressure.  She can experience mild dyspnea at times but overall has felt remarkable.  No complaints of PND orthopnea.    She does not describe palpitating having any presyncope or syncope.  Last office visit we stopped her nifedipine and placed her on valsartan she is doing better.  Otherwise she appears stable    Current Outpatient Medications on File Prior to Visit   Medication Sig Dispense Refill   • aspirin 81 MG EC tablet Take 81 mg by mouth daily.     • atorvastatin (LIPITOR) 20 MG tablet Take 1 tablet by mouth Daily.     • cetirizine (ZyrTEC) 10 MG tablet Take 10 mg by mouth daily.     • cholecalciferol (VITAMIN D3) 25 MCG (1000 UT) tablet Take 1,000 Units by mouth Daily.     • folic acid (FOLVITE) 1 MG tablet Take 1 mg by mouth daily.     • lansoprazole (PREVACID) 30 MG capsule Take 1 capsule by mouth daily.     • loratadine (CLARITIN) 10 MG tablet Take 10 mg by mouth Daily.     • meclizine (ANTIVERT) 12.5 MG tablet As Needed.     • montelukast (SINGULAIR) 10 MG tablet Take 1 tablet by  mouth Every Night.     • nitrofurantoin (MACRODANTIN) 100 MG capsule Take 100 mg by mouth 2 (two) times a day. X 10 days     • potassium chloride (K-DUR) 10 MEQ CR tablet Take 1 tablet by mouth Daily.     • valsartan (DIOVAN) 160 MG tablet Take 160 mg by mouth Daily.     • [DISCONTINUED] NIFEdipine XL (PROCARDIA XL) 30 MG 24 hr tablet Take 30 mg by mouth Daily.     • levothyroxine (SYNTHROID, LEVOTHROID) 25 MCG tablet Take 1 tablet by mouth Daily.     • [DISCONTINUED] amLODIPine (NORVASC) 5 MG tablet Take 1 tablet by mouth Daily. 30 tablet 11     No current facility-administered medications on file prior to visit.       Morphine and related, Penicillins, and Sulfa antibiotics    Past Medical History:   Diagnosis Date   • Acid reflux    • Gout    • History of leukemia    • Hyperlipidemia    • Hypertension    • Hypothyroid    • Leukemia (HCC)     history of leukemia   • Myocardial infarction (HCC)     Per EKG    • Vitamin D deficiency        Social History     Socioeconomic History   • Marital status:    Tobacco Use   • Smoking status: Former Smoker     Years: 45.00     Quit date: 1974     Years since quittin.3   • Smokeless tobacco: Never Used   Substance and Sexual Activity   • Alcohol use: No   • Drug use: No       Family History   Problem Relation Age of Onset   • Heart attack Father    • Hypertension Father    • Hyperlipidemia Father        Review of Systems   Constitutional: Negative.  Negative for chills and fever.   Respiratory: Positive for shortness of breath. Negative for chest tightness.    Cardiovascular: Negative for chest pain, palpitations (flutters) and leg swelling.   Gastrointestinal: Negative.  Negative for abdominal pain, blood in stool, constipation, diarrhea, nausea and vomiting.   Genitourinary: Negative.  Negative for dysuria, frequency, hematuria and urgency.   Musculoskeletal: Negative.  Negative for back pain and neck pain.   Skin: Negative.  Negative for rash.  "  Allergic/Immunologic: Positive for environmental allergies.   Neurological: Positive for dizziness (if standing too quickly) and numbness (bottom of feet). Negative for syncope and light-headedness.   Hematological: Bruises/bleeds easily.   Psychiatric/Behavioral: Negative.  Negative for sleep disturbance (denies waking with soa or cp).       Objective   Vitals:    10/19/21 0857   BP: 120/73   BP Location: Left arm   Patient Position: Sitting   Pulse: 56   SpO2: 98%   Weight: 70.8 kg (156 lb)   Height: 152.4 cm (60\")      /73 (BP Location: Left arm, Patient Position: Sitting)   Pulse 56   Ht 152.4 cm (60\")   Wt 70.8 kg (156 lb)   SpO2 98%   BMI 30.47 kg/m²     Lab Results (most recent)     None          Physical Exam  Vitals and nursing note reviewed.   Constitutional:       General: She is not in acute distress.     Appearance: Normal appearance. She is well-developed.   HENT:      Head: Normocephalic and atraumatic.   Eyes:      General: No scleral icterus.        Right eye: No discharge.         Left eye: No discharge.      Conjunctiva/sclera: Conjunctivae normal.   Neck:      Vascular: No carotid bruit.   Cardiovascular:      Rate and Rhythm: Normal rate and regular rhythm.      Heart sounds: Murmur heard.    Systolic murmur is present with a grade of 1/6.  No friction rub. No gallop.    Pulmonary:      Effort: Pulmonary effort is normal. No respiratory distress.      Breath sounds: Normal breath sounds. No wheezing or rales.   Chest:      Chest wall: No tenderness.   Musculoskeletal:      Right lower leg: No edema.      Left lower leg: No edema.   Skin:     General: Skin is warm and dry.      Coloration: Skin is not pale.      Findings: No erythema or rash.   Neurological:      Mental Status: She is alert and oriented to person, place, and time.      Cranial Nerves: No cranial nerve deficit.   Psychiatric:         Behavior: Behavior normal.         Procedure   Procedures       Assessment/Plan "     Problems Addressed this Visit        Cardiac and Vasculature    Essential hypertension    Relevant Medications    valsartan (DIOVAN) 160 MG tablet    Coronary artery disease involving native coronary artery of native heart without angina pectoris - Primary    Mitral valve insufficiency      Diagnoses       Codes Comments    Coronary artery disease involving native coronary artery of native heart without angina pectoris    -  Primary ICD-10-CM: I25.10  ICD-9-CM: 414.01     Mitral valve insufficiency, unspecified etiology     ICD-10-CM: I34.0  ICD-9-CM: 424.0     Essential hypertension     ICD-10-CM: I10  ICD-9-CM: 401.9         Recommendation  1.  Patient is a 76-year-old female who presents back to the office for routine follow-up and appears to be doing relatively well.  No complaints of chest pain or dyspnea that is significant.  For now we'll continue to manage medically    2.  She has history of nonobstructive and minimal coronary disease.  She has mild to moderate mitral valve insufficiency.  She has faint murmur on examination and no symptoms to suggest worsening valvular function for now we'll monitor    3.  Baseline hypertension.  Currently doing well on valsartan today.  We will see her back for follow-up in 6 months or sooner as symptoms discussed.  Follow-up with primary as scheduled        Patient brought in medicine list to appointment, it's been reviewed with patient and med list was updated in the chart.          Electronically signed by:

## 2022-03-09 DIAGNOSIS — I10 ESSENTIAL HYPERTENSION: Primary | ICD-10-CM

## 2022-03-09 RX ORDER — VALSARTAN 160 MG/1
TABLET ORAL
Qty: 30 TABLET | Refills: 5 | Status: SHIPPED | OUTPATIENT
Start: 2022-03-09 | End: 2022-04-19 | Stop reason: SDUPTHER

## 2022-04-19 ENCOUNTER — OFFICE VISIT (OUTPATIENT)
Dept: CARDIOLOGY | Facility: CLINIC | Age: 77
End: 2022-04-19

## 2022-04-19 VITALS
OXYGEN SATURATION: 98 % | HEART RATE: 56 BPM | DIASTOLIC BLOOD PRESSURE: 73 MMHG | BODY MASS INDEX: 29.64 KG/M2 | SYSTOLIC BLOOD PRESSURE: 136 MMHG | HEIGHT: 60 IN | WEIGHT: 151 LBS

## 2022-04-19 DIAGNOSIS — I10 ESSENTIAL HYPERTENSION: ICD-10-CM

## 2022-04-19 DIAGNOSIS — R06.02 SHORTNESS OF BREATH: Primary | ICD-10-CM

## 2022-04-19 DIAGNOSIS — I34.0 MITRAL VALVE INSUFFICIENCY, UNSPECIFIED ETIOLOGY: ICD-10-CM

## 2022-04-19 PROCEDURE — 99213 OFFICE O/P EST LOW 20 MIN: CPT | Performed by: PHYSICIAN ASSISTANT

## 2022-04-19 RX ORDER — LEVOTHYROXINE SODIUM 0.03 MG/1
25 TABLET ORAL DAILY
COMMUNITY

## 2022-04-19 RX ORDER — VALSARTAN 160 MG/1
160 TABLET ORAL DAILY
Qty: 30 TABLET | Refills: 5 | Status: SHIPPED | OUTPATIENT
Start: 2022-04-19

## 2022-04-19 NOTE — PROGRESS NOTES
Problem list     Subjective   Munira Escobar is a 76 y.o. female     Chief Complaint   Patient presents with   • Mitral valve insufficiency   Problem list  1.  Preserved systolic function  1.1 echocardiogram October 2019 with preserved LV function  2.  Nonobstructive coronary artery disease by catheterization March 2020 by Dr. Go with minimal irregularities and almost normal coronaries with preserved LV function  3.  Mild to moderate mild regurgitation by echocardiogram October 2019  4.  Dyslipidemia  5.  Palpitations  5.1 event monitor in 2019 with no records available  5.2 EKG performed August 31, 2020 suggest frequent PVCs  6.  Hypertension  7.  Hypothyroidism  8. Leukemia followed by [facility] currently in remission    HPI    Patient is a 76-year-old female who presents to the office for evaluation.  Patient has done well.  She does not describe any pain or pressure in her chest.    She does have a degree of dyspnea at times.  It is mild but she does not describe progressive dyspnea or decline in functional status.  She does have fatigue but overall feels well.    She does not describe any PND, orthopnea or significant edema    No complaints of palpitations.  No syncope or presyncope.  Otherwise a stable    Current Outpatient Medications on File Prior to Visit   Medication Sig Dispense Refill   • aspirin 81 MG EC tablet Take 81 mg by mouth daily.     • atorvastatin (LIPITOR) 20 MG tablet Take 1 tablet by mouth Daily.     • cholecalciferol (VITAMIN D3) 25 MCG (1000 UT) tablet Take 1,000 Units by mouth Daily.     • folic acid (FOLVITE) 1 MG tablet Take 1 mg by mouth daily.     • lansoprazole (PREVACID) 30 MG capsule Take 1 capsule by mouth daily.     • levothyroxine (SYNTHROID, LEVOTHROID) 25 MCG tablet Take 25 mcg by mouth Daily.     • loratadine (CLARITIN) 10 MG tablet Take 10 mg by mouth Daily.     • meclizine (ANTIVERT) 12.5 MG tablet As Needed.     • montelukast (SINGULAIR) 10 MG tablet Take 1  tablet by mouth Every Night.     • potassium chloride (K-DUR) 10 MEQ CR tablet Take 1 tablet by mouth Daily.     • [DISCONTINUED] valsartan (DIOVAN) 160 MG tablet TAKE 1 TABLET BY MOUTH EVERY DAY 30 tablet 5   • levothyroxine (SYNTHROID, LEVOTHROID) 25 MCG tablet Take 1 tablet by mouth Daily.     • [DISCONTINUED] cetirizine (ZyrTEC) 10 MG tablet Take 10 mg by mouth daily.     • [DISCONTINUED] nitrofurantoin (MACRODANTIN) 100 MG capsule Take 100 mg by mouth 2 (two) times a day. X 10 days       No current facility-administered medications on file prior to visit.       Morphine and related, Penicillins, and Sulfa antibiotics    Past Medical History:   Diagnosis Date   • Acid reflux    • Gout    • History of leukemia    • Hyperlipidemia    • Hypertension    • Hypothyroid    • Leukemia (HCC)     history of leukemia   • Myocardial infarction (HCC)     Per EKG    • Vitamin D deficiency        Social History     Socioeconomic History   • Marital status:    Tobacco Use   • Smoking status: Former Smoker     Years: 45.00     Quit date: 1974     Years since quittin.8   • Smokeless tobacco: Never Used   Substance and Sexual Activity   • Alcohol use: No   • Drug use: No       Family History   Problem Relation Age of Onset   • Heart attack Father    • Hypertension Father    • Hyperlipidemia Father        Review of Systems   Constitutional: Negative.  Negative for chills and fever.   HENT: Negative.  Negative for congestion and sinus pressure.    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 Negative for chest tightness.    Cardiovascular: Negative.  Negative for chest pain, palpitations and leg swelling.   Gastrointestinal: Negative.  Negative for blood in stool.   Genitourinary: Negative.  Negative for hematuria.   Musculoskeletal: Positive for back pain (left shoulder).   Neurological: Positive for dizziness (at times when bending down). Negative for syncope and  "light-headedness.   Psychiatric/Behavioral: Negative.  Negative for sleep disturbance (denies waking with soa or cp).       Objective   Vitals:    04/19/22 1028   BP: 136/73   BP Location: Left arm   Patient Position: Sitting   Pulse: 56   SpO2: 98%   Weight: 68.5 kg (151 lb)   Height: 152.4 cm (60\")      /73 (BP Location: Left arm, Patient Position: Sitting)   Pulse 56   Ht 152.4 cm (60\")   Wt 68.5 kg (151 lb)   SpO2 98%   BMI 29.49 kg/m²     Lab Results (most recent)     None          Physical Exam  Vitals and nursing note reviewed.   Constitutional:       General: She is not in acute distress.     Appearance: Normal appearance. She is well-developed.   HENT:      Head: Normocephalic and atraumatic.   Eyes:      General: No scleral icterus.        Right eye: No discharge.         Left eye: No discharge.      Conjunctiva/sclera: Conjunctivae normal.   Neck:      Vascular: No carotid bruit.   Cardiovascular:      Rate and Rhythm: Normal rate and regular rhythm.      Heart sounds: Murmur heard.    Systolic murmur is present with a grade of 1/6.    No friction rub. No gallop.   Pulmonary:      Effort: Pulmonary effort is normal. No respiratory distress.      Breath sounds: Normal breath sounds. No wheezing or rales.   Chest:      Chest wall: No tenderness.   Musculoskeletal:      Right lower leg: No edema.      Left lower leg: No edema.   Skin:     General: Skin is warm and dry.      Coloration: Skin is not pale.      Findings: No erythema or rash.   Neurological:      Mental Status: She is alert and oriented to person, place, and time.      Cranial Nerves: No cranial nerve deficit.   Psychiatric:         Behavior: Behavior normal.         Procedure   Procedures       Assessment/Plan     Problems Addressed this Visit        Cardiac and Vasculature    Essential hypertension    Relevant Medications    valsartan (DIOVAN) 160 MG tablet    Mitral valve insufficiency       Pulmonary and Pneumonias    Shortness of " breath - Primary      Diagnoses       Codes Comments    Shortness of breath    -  Primary ICD-10-CM: R06.02  ICD-9-CM: 786.05     Essential hypertension     ICD-10-CM: I10  ICD-9-CM: 401.9     Mitral valve insufficiency, unspecified etiology     ICD-10-CM: I34.0  ICD-9-CM: 424.0         Recommendation  1.  Patient is a 76-year-old female who presents to the office for evaluation and appears to be doing well.  She has a degree of mild dyspnea but appears relatively stable.  For now, she would like to monitor symptoms.  If this was to worsen as discussed with her, we recommend her calling the office    2.  Patient with baseline hypertension doing well on current medical regimen will continue at this time.    3.  Mild to moderate mitral insufficiency noticed by last echo.  We discussed repeat assessment.  She would like to monitor for now and consider in the future.  She does not want any testing at this time.  She is on appropriate medication including antiplatelet and statin therapy.  Labs are being monitored by primary    4.  For now, we will see patient back for follow-up in 6 months.  Follow-up with primary as scheduled           Patient brought in medicine list to appointment, it's been reviewed with patient and med list was updated in the chart.     Advance Care Planning   ACP discussion was held with the patient during this visit. Patient does not have an advance directive, information provided.         Electronically signed by:

## 2022-10-26 ENCOUNTER — OFFICE VISIT (OUTPATIENT)
Dept: CARDIOLOGY | Facility: CLINIC | Age: 77
End: 2022-10-26

## 2022-10-26 VITALS
HEART RATE: 59 BPM | BODY MASS INDEX: 28.47 KG/M2 | DIASTOLIC BLOOD PRESSURE: 71 MMHG | WEIGHT: 145 LBS | SYSTOLIC BLOOD PRESSURE: 145 MMHG | HEIGHT: 60 IN | OXYGEN SATURATION: 98 %

## 2022-10-26 DIAGNOSIS — I10 ESSENTIAL HYPERTENSION: ICD-10-CM

## 2022-10-26 DIAGNOSIS — I34.0 MITRAL VALVE INSUFFICIENCY, UNSPECIFIED ETIOLOGY: ICD-10-CM

## 2022-10-26 DIAGNOSIS — I25.10 CORONARY ARTERY DISEASE INVOLVING NATIVE CORONARY ARTERY OF NATIVE HEART WITHOUT ANGINA PECTORIS: Primary | ICD-10-CM

## 2022-10-26 PROCEDURE — 93000 ELECTROCARDIOGRAM COMPLETE: CPT | Performed by: PHYSICIAN ASSISTANT

## 2022-10-26 PROCEDURE — 99213 OFFICE O/P EST LOW 20 MIN: CPT | Performed by: PHYSICIAN ASSISTANT

## 2022-10-26 NOTE — PROGRESS NOTES
"    Heri Escobar is a 77 y.o. female     Chief Complaint   Patient presents with   • Follow-up   • Mitral Valve insufficiency     Problem list   1.  Preserved systolic function  1.1 echocardiogram October 2019 with preserved LV function  2.  Nonobstructive coronary artery disease by catheterization March 2020 by Dr. Go with minimal irregularities and almost normal coronaries with preserved LV function  3.  Mild to moderate mild regurgitation by echocardiogram October 2019  4.  Dyslipidemia  5.  Palpitations  5.1 event monitor in 2019 with no records available  5.2 EKG performed August 31, 2020 suggest frequent PVCs  6.  Hypertension  7.  Hypothyroidism  8. Leukemia followed by [facility] [place] currently in remission       HPI    Patient is a 77-year-old female who presents to the office to be evaluated.    Patient has been doing well.  Apparently, she will feel occasional \"pinching\" sensation around her sternum.  This is on occasion and atypical.  She has mild dyspnea but no progressive shortness of breath.  No complaints of PND orthopnea.    She does palpitate on occasion.  She does not describe dizziness, presyncope or syncope.  She does not describe any symptoms of cerebral embolic events.  Otherwise she is stable.          Current Outpatient Medications on File Prior to Visit   Medication Sig Dispense Refill   • aspirin 81 MG EC tablet Take 81 mg by mouth daily.     • atorvastatin (LIPITOR) 20 MG tablet Take 1 tablet by mouth Daily.     • cholecalciferol (VITAMIN D3) 25 MCG (1000 UT) tablet Take 1,000 Units by mouth Daily.     • folic acid (FOLVITE) 1 MG tablet Take 1 mg by mouth daily.     • lansoprazole (PREVACID) 30 MG capsule Take 1 capsule by mouth daily.     • levothyroxine (SYNTHROID, LEVOTHROID) 25 MCG tablet Take 25 mcg by mouth Daily.     • loratadine (CLARITIN) 10 MG tablet Take 10 mg by mouth Daily.     • meclizine (ANTIVERT) 12.5 MG tablet As Needed.     • montelukast (SINGULAIR) " "10 MG tablet Take 1 tablet by mouth Every Night.     • potassium chloride (K-DUR) 10 MEQ CR tablet Take 1 tablet by mouth Daily.     • valsartan (DIOVAN) 160 MG tablet Take 1 tablet by mouth Daily. 30 tablet 5   • levothyroxine (SYNTHROID, LEVOTHROID) 25 MCG tablet Take 1 tablet by mouth Daily.       No current facility-administered medications on file prior to visit.       Morphine and related, Penicillins, and Sulfa antibiotics    Past Medical History:   Diagnosis Date   • Acid reflux    • Gout    • History of leukemia    • Hyperlipidemia    • Hypertension    • Hypothyroid    • Leukemia (HCC)     history of leukemia   • Myocardial infarction (HCC)     Per EKG    • Vitamin D deficiency        Social History     Socioeconomic History   • Marital status:    Tobacco Use   • Smoking status: Former     Years: 45.00     Types: Cigarettes     Quit date: 1974     Years since quittin.3   • Smokeless tobacco: Never   Substance and Sexual Activity   • Alcohol use: No   • Drug use: No       Family History   Problem Relation Age of Onset   • Heart attack Father    • Hypertension Father    • Hyperlipidemia Father        Review of Systems   Constitutional: Negative.  Negative for chills and fever.   HENT: Negative.  Negative for congestion and sinus pressure.    Respiratory: Positive for shortness of breath. Negative for chest tightness.    Cardiovascular: Positive for chest pain (\"pinch\") and palpitations (flutter at times). Negative for leg swelling.   Gastrointestinal: Negative.  Negative for blood in stool.   Endocrine: Negative.  Negative for cold intolerance and heat intolerance.   Genitourinary: Negative.  Negative for hematuria.   Neurological: Positive for dizziness (taking meclizine, vertigo). Negative for syncope and light-headedness.   Psychiatric/Behavioral: Negative.  Negative for sleep disturbance (denies waking with soa or cp).       Objective   Vitals:    10/26/22 0923   BP: 145/71   BP Location: " "Left arm   Patient Position: Sitting   Pulse: 59   SpO2: 98%   Weight: 65.8 kg (145 lb)   Height: 152.4 cm (60\")      /71 (BP Location: Left arm, Patient Position: Sitting)   Pulse 59   Ht 152.4 cm (60\")   Wt 65.8 kg (145 lb)   SpO2 98%   BMI 28.32 kg/m²     Lab Results (most recent)     None          Physical Exam  Vitals and nursing note reviewed.   Constitutional:       General: She is not in acute distress.     Appearance: Normal appearance. She is well-developed.   HENT:      Head: Normocephalic and atraumatic.   Eyes:      General: No scleral icterus.        Right eye: No discharge.         Left eye: No discharge.      Conjunctiva/sclera: Conjunctivae normal.   Neck:      Vascular: No carotid bruit.   Cardiovascular:      Rate and Rhythm: Normal rate and regular rhythm.      Heart sounds: Normal heart sounds. No murmur heard.    No friction rub. No gallop.   Pulmonary:      Effort: Pulmonary effort is normal. No respiratory distress.      Breath sounds: Normal breath sounds. No wheezing or rales.   Chest:      Chest wall: No tenderness.   Musculoskeletal:      Right lower leg: No edema.      Left lower leg: No edema.   Skin:     General: Skin is warm and dry.      Coloration: Skin is not pale.      Findings: No erythema or rash.   Neurological:      Mental Status: She is alert and oriented to person, place, and time.      Cranial Nerves: No cranial nerve deficit.   Psychiatric:         Behavior: Behavior normal.         Procedure     ECG 12 Lead    Date/Time: 10/26/2022 9:29 AM  Performed by: Robles Aguilar PA  Authorized by: Robles Aguilar PA   Comparison: compared with previous ECG from 4/27/2022  Comments: EKG demonstrates sinus bradycardia 50 bpm with no acute ST changes               Assessment & Plan     Problems Addressed this Visit        Cardiac and Vasculature    Essential hypertension    Coronary artery disease involving native coronary artery of native heart without angina " pectoris - Primary    Mitral valve insufficiency   Diagnoses       Codes Comments    Coronary artery disease involving native coronary artery of native heart without angina pectoris    -  Primary ICD-10-CM: I25.10  ICD-9-CM: 414.01     Mitral valve insufficiency, unspecified etiology     ICD-10-CM: I34.0  ICD-9-CM: 424.0     Essential hypertension     ICD-10-CM: I10  ICD-9-CM: 401.9           Recommendation  1.  Patient is a 77-year-old female who presents to the office for evaluation.  She is feeling remarkably well.  Her chest pain is clearly atypical.  I do not feel it needs further evaluation.  She has essentially normal coronaries by last catheterization.  For now, we can monitor her symptoms.    2.  Mild mitral disease noted.  We can continue to monitor.  No severe murmur noted on examination.  No symptoms to suggest decompensating valve.    3.  Patient with baseline hypertension doing well on medical therapy.  Her labs are routinely monitored by primary.  We will see her back for follow-up in 6 months or sooner as symptoms discussed.  Follow-up with primary as scheduled.           Munira Escobar  reports that she quit smoking about 48 years ago. Her smoking use included cigarettes. She has never used smokeless tobacco..    Patient did not bring med list or medicine bottles to appointment, med list has been reviewed and updated based on patient's knowledge of their meds.     Advance Care Planning   ACP discussion was held with the patient during this visit. Patient does not have an advance directive, declines further assistance.         Electronically signed by:

## 2023-06-07 ENCOUNTER — OFFICE VISIT (OUTPATIENT)
Dept: CARDIOLOGY | Facility: CLINIC | Age: 78
End: 2023-06-07
Payer: MEDICARE

## 2023-06-07 VITALS
OXYGEN SATURATION: 98 % | SYSTOLIC BLOOD PRESSURE: 155 MMHG | HEART RATE: 61 BPM | DIASTOLIC BLOOD PRESSURE: 79 MMHG | BODY MASS INDEX: 26.93 KG/M2 | WEIGHT: 137.2 LBS | HEIGHT: 60 IN

## 2023-06-07 DIAGNOSIS — I25.10 CORONARY ARTERY DISEASE INVOLVING NATIVE CORONARY ARTERY OF NATIVE HEART WITHOUT ANGINA PECTORIS: ICD-10-CM

## 2023-06-07 DIAGNOSIS — R07.89 CHEST PAIN, ATYPICAL: ICD-10-CM

## 2023-06-07 DIAGNOSIS — I10 ESSENTIAL HYPERTENSION: Primary | ICD-10-CM

## 2023-06-07 PROCEDURE — 99214 OFFICE O/P EST MOD 30 MIN: CPT | Performed by: NURSE PRACTITIONER

## 2023-06-07 PROCEDURE — 3077F SYST BP >= 140 MM HG: CPT | Performed by: NURSE PRACTITIONER

## 2023-06-07 PROCEDURE — 3078F DIAST BP <80 MM HG: CPT | Performed by: NURSE PRACTITIONER

## 2023-06-07 PROCEDURE — 1159F MED LIST DOCD IN RCRD: CPT | Performed by: NURSE PRACTITIONER

## 2023-06-07 PROCEDURE — 1160F RVW MEDS BY RX/DR IN RCRD: CPT | Performed by: NURSE PRACTITIONER

## 2023-06-07 RX ORDER — OLMESARTAN MEDOXOMIL 20 MG/1
20 TABLET ORAL DAILY
Qty: 30 TABLET | Refills: 6 | Status: SHIPPED | OUTPATIENT
Start: 2023-06-07

## 2023-06-07 NOTE — PROGRESS NOTES
Subjective     Munira Escobar is a 78 y.o. female who presents to day for Coronary Artery Disease (6 month f/u), Chest Pain, Hypertension (Did not take Valsartan today and only half tablet the last week), and Palpitations.    CHIEF COMPLIANT  Chief Complaint   Patient presents with   • Coronary Artery Disease     6 month f/u   • Chest Pain   • Hypertension     Did not take Valsartan today and only half tablet the last week   • Palpitations       Active Problems:  Problem List Items Addressed This Visit        Cardiac and Vasculature    Essential hypertension - Primary    Relevant Medications    olmesartan (Benicar) 20 MG tablet    Coronary artery disease involving native coronary artery of native heart without angina pectoris   Other Visit Diagnoses     Chest pain, atypical          Problem list   1.  Preserved systolic function  1.1 echocardiogram October 2019 with preserved LV function  2.  Nonobstructive coronary artery disease by catheterization March 2020 by Dr. Go with minimal irregularities and almost normal coronaries with preserved LV function  3.  Mild to moderate mild regurgitation by echocardiogram October 2019  4.  Dyslipidemia  5.  Palpitations  5.1 event monitor in 2019 with no records available  5.2 EKG performed August 31, 2020 suggest frequent PVCs  6.  Hypertension  7.  Hypothyroidism  8. Leukemia followed by [facility] currently in remission    HPI  HPI    Munira Escobar is a 78-year-old female patient who is being followed up today. She does have a history of nonobstructive coronary artery disease per left heart catheterization in 2020. She is on atorvastatin and aspirin for antiplatelet therapy. The patient's heart catheterization in 2020 revealed minimal nonobstructive disease.     She does have chest pain.  She describes as a pins-and-needles like sensation in her chest when she is doing heavy lifting or if she gets stressed.    She has had to reduce her valsartan in half due to low  "blood pressure. However, her blood pressure today is 155/79 mmHg, heart rate is 61 BPM.  She is accompanied by her daughter.  The patient states when she takes a whole tablet of the valsartan, she gets a bad headache. She states she checks her blood pressure with her \" fingersticker\" and her blood pressure goes down. She states when she stands up, her blood pressure will go down to 37. She states she has been breaking the valsartan in half and only taking half. She states it does not give her such a bad headache. She states Dr. Mcwilliams told her to take her blood pressure medication at night; however, she notes she still gets the bad headache. She states she did take her blood pressure medication last night.     The patient states the only real problem she has is vertigo. She states Dr. Mcwilliams prescribed her meclizine. She states it did help her sleep. She states it bothered her for approximately 2 weeks. She states she did not eat or sleep well. She states she uses 4 pillows elevating her head. She states if she sits at the corner of a wall, the dizziness does not go away. She states it is persistent regardless of what she does. She states if she pulls up in bed, she puts it over her head. She states she will go off to sleep; however, she notes when she wakes up, it is not as bad. She states it is spinning and then it stops. The patient says she does not want to try physical therapy now.        PRIOR MEDS  Current Outpatient Medications on File Prior to Visit   Medication Sig Dispense Refill   • aspirin 81 MG EC tablet Take 1 tablet by mouth Daily.     • atorvastatin (LIPITOR) 20 MG tablet Take 1 tablet by mouth Daily.     • cholecalciferol (VITAMIN D3) 25 MCG (1000 UT) tablet Take 1 tablet by mouth Daily.     • folic acid (FOLVITE) 1 MG tablet Take 1 tablet by mouth Daily.     • lansoprazole (PREVACID) 30 MG capsule Take 1 capsule by mouth Daily.     • levothyroxine (SYNTHROID, LEVOTHROID) 25 MCG tablet Take 1 " tablet by mouth Daily.     • loratadine (CLARITIN) 10 MG tablet Take 1 tablet by mouth Daily.     • meclizine (ANTIVERT) 12.5 MG tablet As Needed.     • montelukast (SINGULAIR) 10 MG tablet Take 1 tablet by mouth Every Night.     • potassium chloride (K-DUR) 10 MEQ CR tablet Take 1 tablet by mouth Daily.       No current facility-administered medications on file prior to visit.       ALLERGIES  Morphine and related, Penicillins, Valsartan, and Sulfa antibiotics    HISTORY  Past Medical History:   Diagnosis Date   • Acid reflux    • Gout    • History of leukemia    • Hyperlipidemia    • Hypertension    • Hypothyroid    • Leukemia     history of leukemia   • Myocardial infarction     Per EKG    • Vitamin D deficiency        Social History     Socioeconomic History   • Marital status:    Tobacco Use   • Smoking status: Former     Years: 45.00     Types: Cigarettes     Quit date: 1974     Years since quittin.9   • Smokeless tobacco: Never   Substance and Sexual Activity   • Alcohol use: No   • Drug use: No       Family History   Problem Relation Age of Onset   • Heart attack Father    • Hypertension Father    • Hyperlipidemia Father        Review of Systems   Constitutional:  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 Negative for chills, diaphoresis and fever.   HENT: Negative.     Eyes: Negative.  Negative for visual disturbance.   Respiratory: Negative.  Negative for apnea, cough, chest tightness, shortness of breath (on exertion only) and wheezing.    Cardiovascular:  Positive for chest pain (pin needle feeling with heavy lifting, stress) and palpitations (occas flutters after taking  medication). Negative for leg swelling.   Gastrointestinal: Negative.  Positive for nausea. Negative for abdominal pain, blood in stool, constipation, diarrhea and vomiting. Rectal pain: has Zofran as needed.  Endocrine: Negative.    Genitourinary: Negative.  Negative for hematuria.   Musculoskeletal:  "Negative.  Negative for arthralgias, back pain, myalgias and neck pain.   Skin: Negative.    Allergic/Immunologic: Negative.    Neurological:  Positive for dizziness (vertigo), light-headedness (after taking medication) and headaches (when BP is up). Negative for syncope, weakness and numbness.   Hematological: Negative.  Does not bruise/bleed easily.   Psychiatric/Behavioral: Negative.  Negative for sleep disturbance.      Objective     VITALS: /79 (BP Location: Right arm, Patient Position: Sitting)   Pulse 61   Ht 152.4 cm (60\")   Wt 62.2 kg (137 lb 3.2 oz)   SpO2 98%   BMI 26.80 kg/m²     LABS:   Lab Results (most recent)       None            IMAGING:   No Images in the past 120 days found..    EXAM:  Physical Exam  Vitals and nursing note reviewed.   Constitutional:       Appearance: She is well-developed.   HENT:      Head: Normocephalic.   Eyes:      Pupils: Pupils are equal, round, and reactive to light.   Neck:      Thyroid: No thyroid mass.      Vascular: No carotid bruit or JVD.      Trachea: Trachea and phonation normal.   Cardiovascular:      Rate and Rhythm: Normal rate and regular rhythm.      Pulses:           Radial pulses are 2+ on the right side and 2+ on the left side.        Posterior tibial pulses are 2+ on the right side and 2+ on the left side.      Heart sounds: Normal heart sounds. No murmur heard.    No friction rub. No gallop.   Pulmonary:      Effort: Pulmonary effort is normal. No respiratory distress.      Breath sounds: Normal breath sounds. No wheezing or rales.   Abdominal:      General: Bowel sounds are normal.      Palpations: Abdomen is soft.   Musculoskeletal:         General: No swelling. Normal range of motion.      Cervical back: Neck supple.   Skin:     General: Skin is warm and dry.      Capillary Refill: Capillary refill takes less than 2 seconds.      Findings: No rash.   Neurological:      Mental Status: She is alert and oriented to person, place, and time. " "  Psychiatric:         Speech: Speech normal.         Behavior: Behavior normal.         Thought Content: Thought content normal.         Judgment: Judgment normal.       /79 (BP Location: Right arm, Patient Position: Sitting)   Pulse 61   Ht 152.4 cm (60\")   Wt 62.2 kg (137 lb 3.2 oz)   SpO2 98%   BMI 26.80 kg/m²       Procedure   Procedures   No procedures were completed today.      Assessment & Plan    Diagnosis Plan   1. Essential hypertension  olmesartan (Benicar) 20 MG tablet      2. Coronary artery disease involving native coronary artery of native heart without angina pectoris        3. Chest pain, atypical            PLAN  1. The patient's most recent cardiac testing results were discussed in full detail during this visit.  2. The patient's medication regimen was discussed in full detail with her.  Changes were made to her antihypertensive therapy.  3. Prescribed the patient Olmesartan  20 mg tablet.  Due to her intolerance of the valsartan.  She will continue to monitor blood pressure on a routine basis report any significant highs or lows.  4.  She does have a history of nonobstructive coronary artery disease per left heart catheterization in 2020.  Overall she is doing well.  She has some atypical chest pain that is like pins-and-needles sensation that only occurs if she lifts heavy or becomes stressed.  We will continue to monitor at this time.  5.  Informed of signs and symptoms of ACS and advised to seek emergent treatment for any new worsening symptoms.  Patient also advised sooner follow-up as needed.  Also advised to follow-up with family doctor as needed  This note is dictated utilizing voice recognition software.  Although this record has been proof read, transcriptional errors may still be present. If questions occur regarding the content of this record please do not hesitate to call our office.  I have reviewed and confirmed the accuracy of the ROS as documented by the MA/LPN/RN " PENNY Reinoso  Assessment  1. Hypertension  2. Hypothyroidism  3. Dyslipidemia  4.  Palpitations    No follow-ups on file.    Diagnoses and all orders for this visit:    1. Essential hypertension (Primary)  -     olmesartan (Benicar) 20 MG tablet; Take 1 tablet by mouth Daily.  Dispense: 30 tablet; Refill: 6    2. Coronary artery disease involving native coronary artery of native heart without angina pectoris    3. Chest pain, atypical        Muniramatheus Escobar  reports that she quit smoking about 48 years ago. Her smoking use included cigarettes. She has never used smokeless tobacco..      Advance Care Planning   ACP discussion was held with the patient during this visit. Patient does not have an advance directive, declines further assistance.     MEDS ORDERED DURING VISIT:  New Medications Ordered This Visit   Medications   • olmesartan (Benicar) 20 MG tablet     Sig: Take 1 tablet by mouth Daily.     Dispense:  30 tablet     Refill:  6           This document has been electronically signed by PENNY Reinoso Jr.  June 11, 2023 23:29 EDT      Transcribed from ambient dictation for PENNY Reinoso by Betina Izaguirre.  06/07/23   16:29 EDT    Patient or patient representative verbalized consent to the visit recording.  I have personally performed the services described in this document as transcribed by the above individual, and it is both accurate and complete.

## 2023-11-16 DIAGNOSIS — I10 ESSENTIAL HYPERTENSION: ICD-10-CM

## 2023-11-16 RX ORDER — OLMESARTAN MEDOXOMIL 20 MG/1
20 TABLET ORAL DAILY
Qty: 30 TABLET | Refills: 6 | Status: SHIPPED | OUTPATIENT
Start: 2023-11-16

## 2023-11-28 ENCOUNTER — APPOINTMENT (OUTPATIENT)
Dept: GENERAL RADIOLOGY | Facility: HOSPITAL | Age: 78
DRG: 061 | End: 2023-11-28
Payer: MEDICARE

## 2023-11-28 ENCOUNTER — APPOINTMENT (OUTPATIENT)
Dept: CT IMAGING | Facility: HOSPITAL | Age: 78
DRG: 061 | End: 2023-11-28
Payer: MEDICARE

## 2023-11-28 ENCOUNTER — HOSPITAL ENCOUNTER (INPATIENT)
Facility: HOSPITAL | Age: 78
LOS: 5 days | Discharge: HOME HEALTH | DRG: 061 | End: 2023-12-03
Attending: EMERGENCY MEDICINE | Admitting: HOSPITALIST
Payer: MEDICARE

## 2023-11-28 DIAGNOSIS — R41.841 COGNITIVE COMMUNICATION DEFICIT: ICD-10-CM

## 2023-11-28 DIAGNOSIS — I63.9 ACUTE CVA (CEREBROVASCULAR ACCIDENT): Primary | ICD-10-CM

## 2023-11-28 DIAGNOSIS — R13.10 DYSPHAGIA, UNSPECIFIED TYPE: ICD-10-CM

## 2023-11-28 DIAGNOSIS — I63.9 ACUTE ISCHEMIC STROKE: ICD-10-CM

## 2023-11-28 PROBLEM — K21.9 GERD (GASTROESOPHAGEAL REFLUX DISEASE): Status: ACTIVE | Noted: 2023-11-28

## 2023-11-28 PROBLEM — I65.23 BILATERAL CAROTID ARTERY STENOSIS: Chronic | Status: ACTIVE | Noted: 2023-11-28

## 2023-11-28 PROBLEM — C92.10 CML (CHRONIC MYELOCYTIC LEUKEMIA): Chronic | Status: ACTIVE | Noted: 2023-11-28

## 2023-11-28 PROBLEM — U07.1 COVID-19 VIRUS DETECTED: Status: ACTIVE | Noted: 2023-11-28

## 2023-11-28 PROBLEM — C92.10 CML (CHRONIC MYELOCYTIC LEUKEMIA): Status: ACTIVE | Noted: 2023-11-28

## 2023-11-28 PROBLEM — E03.9 HYPOTHYROIDISM: Chronic | Status: ACTIVE | Noted: 2023-11-28

## 2023-11-28 PROBLEM — E03.9 HYPOTHYROIDISM: Status: ACTIVE | Noted: 2023-11-28

## 2023-11-28 PROBLEM — I65.23 BILATERAL CAROTID ARTERY STENOSIS: Status: ACTIVE | Noted: 2023-11-28

## 2023-11-28 PROBLEM — K21.9 GERD (GASTROESOPHAGEAL REFLUX DISEASE): Chronic | Status: ACTIVE | Noted: 2023-11-28

## 2023-11-28 LAB
ALT SERPL W P-5'-P-CCNC: 13 U/L (ref 1–33)
APTT PPP: 34.7 SECONDS (ref 22–39)
AST SERPL-CCNC: 16 U/L (ref 1–32)
BASOPHILS # BLD AUTO: 0.02 10*3/MM3 (ref 0–0.2)
BASOPHILS NFR BLD AUTO: 0.2 % (ref 0–1.5)
DEPRECATED RDW RBC AUTO: 51.5 FL (ref 37–54)
EOSINOPHIL # BLD AUTO: 0 10*3/MM3 (ref 0–0.4)
EOSINOPHIL NFR BLD AUTO: 0 % (ref 0.3–6.2)
ERYTHROCYTE [DISTWIDTH] IN BLOOD BY AUTOMATED COUNT: 15.5 % (ref 12.3–15.4)
FLUAV RNA RESP QL NAA+PROBE: NOT DETECTED
FLUBV RNA RESP QL NAA+PROBE: NOT DETECTED
HCT VFR BLD AUTO: 40.6 % (ref 34–46.6)
HGB BLD-MCNC: 12.8 G/DL (ref 12–15.9)
HOLD SPECIMEN: NORMAL
HOLD SPECIMEN: NORMAL
IMM GRANULOCYTES # BLD AUTO: 0.04 10*3/MM3 (ref 0–0.05)
IMM GRANULOCYTES NFR BLD AUTO: 0.5 % (ref 0–0.5)
LYMPHOCYTES # BLD AUTO: 0.8 10*3/MM3 (ref 0.7–3.1)
LYMPHOCYTES NFR BLD AUTO: 9.8 % (ref 19.6–45.3)
MCH RBC QN AUTO: 28.5 PG (ref 26.6–33)
MCHC RBC AUTO-ENTMCNC: 31.5 G/DL (ref 31.5–35.7)
MCV RBC AUTO: 90.4 FL (ref 79–97)
MONOCYTES # BLD AUTO: 0.72 10*3/MM3 (ref 0.1–0.9)
MONOCYTES NFR BLD AUTO: 8.8 % (ref 5–12)
NEUTROPHILS NFR BLD AUTO: 6.57 10*3/MM3 (ref 1.7–7)
NEUTROPHILS NFR BLD AUTO: 80.7 % (ref 42.7–76)
NRBC BLD AUTO-RTO: 0 /100 WBC (ref 0–0.2)
PLATELET # BLD AUTO: 212 10*3/MM3 (ref 140–450)
PMV BLD AUTO: 10.3 FL (ref 6–12)
PROCALCITONIN SERPL-MCNC: 0.06 NG/ML (ref 0–0.25)
QT INTERVAL: 384 MS
QTC INTERVAL: 456 MS
RBC # BLD AUTO: 4.49 10*6/MM3 (ref 3.77–5.28)
SARS-COV-2 RNA RESP QL NAA+PROBE: DETECTED
TROPONIN T SERPL HS-MCNC: 10 NG/L
TSH SERPL DL<=0.05 MIU/L-ACNC: 0.61 UIU/ML (ref 0.27–4.2)
WBC NRBC COR # BLD AUTO: 8.15 10*3/MM3 (ref 3.4–10.8)
WHOLE BLOOD HOLD COAG: NORMAL
WHOLE BLOOD HOLD SPECIMEN: NORMAL

## 2023-11-28 PROCEDURE — 87636 SARSCOV2 & INF A&B AMP PRB: CPT | Performed by: NURSE PRACTITIONER

## 2023-11-28 PROCEDURE — 36415 COLL VENOUS BLD VENIPUNCTURE: CPT

## 2023-11-28 PROCEDURE — 85025 COMPLETE CBC W/AUTO DIFF WBC: CPT | Performed by: EMERGENCY MEDICINE

## 2023-11-28 PROCEDURE — 84450 TRANSFERASE (AST) (SGOT): CPT | Performed by: EMERGENCY MEDICINE

## 2023-11-28 PROCEDURE — 99291 CRITICAL CARE FIRST HOUR: CPT | Performed by: INTERNAL MEDICINE

## 2023-11-28 PROCEDURE — 3E03317 INTRODUCTION OF OTHER THROMBOLYTIC INTO PERIPHERAL VEIN, PERCUTANEOUS APPROACH: ICD-10-PCS | Performed by: HOSPITALIST

## 2023-11-28 PROCEDURE — 70450 CT HEAD/BRAIN W/O DYE: CPT

## 2023-11-28 PROCEDURE — 25010000002 TENECTEPLASE PER 50 MG

## 2023-11-28 PROCEDURE — 70498 CT ANGIOGRAPHY NECK: CPT

## 2023-11-28 PROCEDURE — 93005 ELECTROCARDIOGRAM TRACING: CPT | Performed by: EMERGENCY MEDICINE

## 2023-11-28 PROCEDURE — 71045 X-RAY EXAM CHEST 1 VIEW: CPT

## 2023-11-28 PROCEDURE — 99222 1ST HOSP IP/OBS MODERATE 55: CPT

## 2023-11-28 PROCEDURE — 70496 CT ANGIOGRAPHY HEAD: CPT

## 2023-11-28 PROCEDURE — 85730 THROMBOPLASTIN TIME PARTIAL: CPT | Performed by: EMERGENCY MEDICINE

## 2023-11-28 PROCEDURE — 0042T HC CT CEREBRAL PERFUSION W/WO CONTRAST: CPT

## 2023-11-28 PROCEDURE — 84145 PROCALCITONIN (PCT): CPT | Performed by: NURSE PRACTITIONER

## 2023-11-28 PROCEDURE — 84460 ALANINE AMINO (ALT) (SGPT): CPT | Performed by: EMERGENCY MEDICINE

## 2023-11-28 PROCEDURE — 25510000001 IOPAMIDOL PER 1 ML: Performed by: EMERGENCY MEDICINE

## 2023-11-28 PROCEDURE — 99285 EMERGENCY DEPT VISIT HI MDM: CPT

## 2023-11-28 PROCEDURE — 84443 ASSAY THYROID STIM HORMONE: CPT | Performed by: NURSE PRACTITIONER

## 2023-11-28 PROCEDURE — 84484 ASSAY OF TROPONIN QUANT: CPT | Performed by: EMERGENCY MEDICINE

## 2023-11-28 RX ORDER — SODIUM CHLORIDE 0.9 % (FLUSH) 0.9 %
10 SYRINGE (ML) INJECTION
Status: ACTIVE | OUTPATIENT
Start: 2023-11-28 | End: 2023-11-28

## 2023-11-28 RX ORDER — ASPIRIN 325 MG
325 TABLET ORAL DAILY
Status: DISCONTINUED | OUTPATIENT
Start: 2023-11-29 | End: 2023-11-30

## 2023-11-28 RX ORDER — SODIUM CHLORIDE 0.9 % (FLUSH) 0.9 %
10 SYRINGE (ML) INJECTION AS NEEDED
Status: DISCONTINUED | OUTPATIENT
Start: 2023-11-28 | End: 2023-11-29

## 2023-11-28 RX ORDER — SODIUM CHLORIDE 0.9 % (FLUSH) 0.9 %
10 SYRINGE (ML) INJECTION EVERY 12 HOURS SCHEDULED
Status: DISCONTINUED | OUTPATIENT
Start: 2023-11-29 | End: 2023-11-29

## 2023-11-28 RX ORDER — SODIUM CHLORIDE 9 MG/ML
40 INJECTION, SOLUTION INTRAVENOUS AS NEEDED
Status: DISCONTINUED | OUTPATIENT
Start: 2023-11-28 | End: 2023-11-29

## 2023-11-28 RX ORDER — ASPIRIN 300 MG/1
300 SUPPOSITORY RECTAL DAILY
Status: DISCONTINUED | OUTPATIENT
Start: 2023-11-29 | End: 2023-11-30

## 2023-11-28 RX ORDER — SODIUM CHLORIDE 0.9 % (FLUSH) 0.9 %
10 SYRINGE (ML) INJECTION ONCE
Status: COMPLETED | OUTPATIENT
Start: 2023-11-28 | End: 2023-11-28

## 2023-11-28 RX ORDER — ATORVASTATIN CALCIUM 40 MG/1
80 TABLET, FILM COATED ORAL NIGHTLY
Status: DISCONTINUED | OUTPATIENT
Start: 2023-11-28 | End: 2023-12-02

## 2023-11-28 RX ADMIN — Medication 10 ML: at 21:35

## 2023-11-28 RX ADMIN — IOPAMIDOL 115 ML: 755 INJECTION, SOLUTION INTRAVENOUS at 21:11

## 2023-11-28 RX ADMIN — Medication 14 MG: at 21:35

## 2023-11-28 RX ADMIN — Medication 10 ML: at 21:36

## 2023-11-29 ENCOUNTER — APPOINTMENT (OUTPATIENT)
Dept: CT IMAGING | Facility: HOSPITAL | Age: 78
DRG: 061 | End: 2023-11-29
Payer: MEDICARE

## 2023-11-29 ENCOUNTER — APPOINTMENT (OUTPATIENT)
Dept: CARDIOLOGY | Facility: HOSPITAL | Age: 78
DRG: 061 | End: 2023-11-29
Payer: MEDICARE

## 2023-11-29 ENCOUNTER — APPOINTMENT (OUTPATIENT)
Dept: NEUROLOGY | Facility: HOSPITAL | Age: 78
DRG: 061 | End: 2023-11-29
Payer: MEDICARE

## 2023-11-29 ENCOUNTER — APPOINTMENT (OUTPATIENT)
Dept: MRI IMAGING | Facility: HOSPITAL | Age: 78
DRG: 061 | End: 2023-11-29
Payer: MEDICARE

## 2023-11-29 PROBLEM — R09.89 SUSPECTED CEREBROVASCULAR ACCIDENT (CVA): Status: ACTIVE | Noted: 2023-11-29

## 2023-11-29 LAB
ANION GAP SERPL CALCULATED.3IONS-SCNC: 12 MMOL/L (ref 5–15)
BACTERIA UR QL AUTO: ABNORMAL /HPF
BH CV ECHO MEAS - AI P1/2T: 567.2 MSEC
BH CV ECHO MEAS - AO MAX PG: 13.4 MMHG
BH CV ECHO MEAS - AO MEAN PG: 6 MMHG
BH CV ECHO MEAS - AO ROOT DIAM: 3 CM
BH CV ECHO MEAS - AO V2 MAX: 183 CM/SEC
BH CV ECHO MEAS - AO V2 VTI: 32.5 CM
BH CV ECHO MEAS - AVA(I,D): 2.43 CM2
BH CV ECHO MEAS - EDV(CUBED): 74.1 ML
BH CV ECHO MEAS - EDV(MOD-SP2): 71.8 ML
BH CV ECHO MEAS - EDV(MOD-SP4): 72 ML
BH CV ECHO MEAS - EF(MOD-BP): 63.6 %
BH CV ECHO MEAS - EF(MOD-SP2): 63.5 %
BH CV ECHO MEAS - EF(MOD-SP4): 63.6 %
BH CV ECHO MEAS - ESV(CUBED): 27 ML
BH CV ECHO MEAS - ESV(MOD-SP2): 26.2 ML
BH CV ECHO MEAS - ESV(MOD-SP4): 26.2 ML
BH CV ECHO MEAS - FS: 28.6 %
BH CV ECHO MEAS - IVS/LVPW: 1.25 CM
BH CV ECHO MEAS - IVSD: 1 CM
BH CV ECHO MEAS - LA DIMENSION: 3.8 CM
BH CV ECHO MEAS - LAT PEAK E' VEL: 11.5 CM/SEC
BH CV ECHO MEAS - LV MASS(C)D: 118.7 GRAMS
BH CV ECHO MEAS - LV MAX PG: 6.5 MMHG
BH CV ECHO MEAS - LV MEAN PG: 3 MMHG
BH CV ECHO MEAS - LV V1 MAX: 127.5 CM/SEC
BH CV ECHO MEAS - LV V1 VTI: 25.1 CM
BH CV ECHO MEAS - LVIDD: 4.2 CM
BH CV ECHO MEAS - LVIDS: 3 CM
BH CV ECHO MEAS - LVOT AREA: 3.1 CM2
BH CV ECHO MEAS - LVOT DIAM: 2 CM
BH CV ECHO MEAS - LVPWD: 0.8 CM
BH CV ECHO MEAS - MED PEAK E' VEL: 9.1 CM/SEC
BH CV ECHO MEAS - MV A MAX VEL: 121 CM/SEC
BH CV ECHO MEAS - MV DEC SLOPE: 320 CM/SEC2
BH CV ECHO MEAS - MV DEC TIME: 0.18 SEC
BH CV ECHO MEAS - MV E MAX VEL: 94 CM/SEC
BH CV ECHO MEAS - MV E/A: 0.78
BH CV ECHO MEAS - MV MAX PG: 5.4 MMHG
BH CV ECHO MEAS - MV MEAN PG: 2 MMHG
BH CV ECHO MEAS - MV P1/2T: 99.8 MSEC
BH CV ECHO MEAS - MV V2 VTI: 35.6 CM
BH CV ECHO MEAS - MVA(P1/2T): 2.21 CM2
BH CV ECHO MEAS - MVA(VTI): 2.21 CM2
BH CV ECHO MEAS - PA ACC TIME: 0.11 SEC
BH CV ECHO MEAS - RAP SYSTOLE: 8 MMHG
BH CV ECHO MEAS - RVSP: 31 MMHG
BH CV ECHO MEAS - SV(LVOT): 78.9 ML
BH CV ECHO MEAS - SV(MOD-SP2): 45.6 ML
BH CV ECHO MEAS - SV(MOD-SP4): 45.8 ML
BH CV ECHO MEAS - TAPSE (>1.6): 2 CM
BH CV ECHO MEAS - TR MAX PG: 22.5 MMHG
BH CV ECHO MEAS - TR MAX VEL: 234.3 CM/SEC
BH CV ECHO MEASUREMENTS AVERAGE E/E' RATIO: 9.13
BH CV VAS BP LEFT ARM: NORMAL MMHG
BH CV XLRA - RV BASE: 3.7 CM
BH CV XLRA - RV LENGTH: 7.4 CM
BH CV XLRA - RV MID: 3.1 CM
BH CV XLRA - TDI S': 19.6 CM/SEC
BILIRUB UR QL STRIP: NEGATIVE
BUN SERPL-MCNC: 12 MG/DL (ref 8–23)
BUN/CREAT SERPL: 22.6 (ref 7–25)
CALCIUM SPEC-SCNC: 9.2 MG/DL (ref 8.6–10.5)
CHLORIDE SERPL-SCNC: 102 MMOL/L (ref 98–107)
CHOLEST SERPL-MCNC: 106 MG/DL (ref 0–200)
CLARITY UR: CLEAR
CO2 SERPL-SCNC: 23 MMOL/L (ref 22–29)
COLOR UR: YELLOW
CREAT SERPL-MCNC: 0.53 MG/DL (ref 0.57–1)
DEPRECATED RDW RBC AUTO: 51.6 FL (ref 37–54)
EGFRCR SERPLBLD CKD-EPI 2021: 95.4 ML/MIN/1.73
ERYTHROCYTE [DISTWIDTH] IN BLOOD BY AUTOMATED COUNT: 15.9 % (ref 12.3–15.4)
GLUCOSE BLDC GLUCOMTR-MCNC: 87 MG/DL (ref 70–130)
GLUCOSE BLDC GLUCOMTR-MCNC: 89 MG/DL (ref 70–130)
GLUCOSE BLDC GLUCOMTR-MCNC: 92 MG/DL (ref 70–130)
GLUCOSE SERPL-MCNC: 98 MG/DL (ref 65–99)
GLUCOSE UR STRIP-MCNC: NEGATIVE MG/DL
HBA1C MFR BLD: 5 % (ref 4.8–5.6)
HCT VFR BLD AUTO: 41.1 % (ref 34–46.6)
HDLC SERPL-MCNC: 64 MG/DL (ref 40–60)
HGB BLD-MCNC: 12.8 G/DL (ref 12–15.9)
HGB UR QL STRIP.AUTO: ABNORMAL
HOLD SPECIMEN: NORMAL
HYALINE CASTS UR QL AUTO: ABNORMAL /LPF
IVRT: 70 MS
KETONES UR QL STRIP: ABNORMAL
LDLC SERPL CALC-MCNC: 28 MG/DL (ref 0–100)
LDLC/HDLC SERPL: 0.45 {RATIO}
LEFT ATRIUM VOLUME INDEX: 24.5 ML/M2
LEUKOCYTE ESTERASE UR QL STRIP.AUTO: NEGATIVE
MAGNESIUM SERPL-MCNC: 2.5 MG/DL (ref 1.6–2.4)
MCH RBC QN AUTO: 27.9 PG (ref 26.6–33)
MCHC RBC AUTO-ENTMCNC: 31.1 G/DL (ref 31.5–35.7)
MCV RBC AUTO: 89.7 FL (ref 79–97)
NITRITE UR QL STRIP: NEGATIVE
PH UR STRIP.AUTO: <=5 [PH] (ref 5–8)
PHOSPHATE SERPL-MCNC: 3.8 MG/DL (ref 2.5–4.5)
PLATELET # BLD AUTO: 185 10*3/MM3 (ref 140–450)
PMV BLD AUTO: 10.2 FL (ref 6–12)
POTASSIUM SERPL-SCNC: 3.9 MMOL/L (ref 3.5–5.2)
PROT UR QL STRIP: ABNORMAL
RBC # BLD AUTO: 4.58 10*6/MM3 (ref 3.77–5.28)
RBC # UR STRIP: ABNORMAL /HPF
REF LAB TEST METHOD: ABNORMAL
SODIUM SERPL-SCNC: 137 MMOL/L (ref 136–145)
SP GR UR STRIP: 1.03 (ref 1–1.03)
SQUAMOUS #/AREA URNS HPF: ABNORMAL /HPF
TRIGL SERPL-MCNC: 67 MG/DL (ref 0–150)
UROBILINOGEN UR QL STRIP: ABNORMAL
VLDLC SERPL-MCNC: 14 MG/DL (ref 5–40)
WBC # UR STRIP: ABNORMAL /HPF
WBC NRBC COR # BLD AUTO: 8.01 10*3/MM3 (ref 3.4–10.8)

## 2023-11-29 PROCEDURE — XW033E5 INTRODUCTION OF REMDESIVIR ANTI-INFECTIVE INTO PERIPHERAL VEIN, PERCUTANEOUS APPROACH, NEW TECHNOLOGY GROUP 5: ICD-10-PCS | Performed by: INTERNAL MEDICINE

## 2023-11-29 PROCEDURE — 81001 URINALYSIS AUTO W/SCOPE: CPT | Performed by: INTERNAL MEDICINE

## 2023-11-29 PROCEDURE — 99291 CRITICAL CARE FIRST HOUR: CPT | Performed by: STUDENT IN AN ORGANIZED HEALTH CARE EDUCATION/TRAINING PROGRAM

## 2023-11-29 PROCEDURE — 95819 EEG AWAKE AND ASLEEP: CPT

## 2023-11-29 PROCEDURE — 95819 EEG AWAKE AND ASLEEP: CPT | Performed by: PSYCHIATRY & NEUROLOGY

## 2023-11-29 PROCEDURE — 82948 REAGENT STRIP/BLOOD GLUCOSE: CPT

## 2023-11-29 PROCEDURE — 84100 ASSAY OF PHOSPHORUS: CPT | Performed by: NURSE PRACTITIONER

## 2023-11-29 PROCEDURE — 83036 HEMOGLOBIN GLYCOSYLATED A1C: CPT

## 2023-11-29 PROCEDURE — 93306 TTE W/DOPPLER COMPLETE: CPT | Performed by: INTERNAL MEDICINE

## 2023-11-29 PROCEDURE — 80061 LIPID PANEL: CPT

## 2023-11-29 PROCEDURE — 80048 BASIC METABOLIC PNL TOTAL CA: CPT | Performed by: NURSE PRACTITIONER

## 2023-11-29 PROCEDURE — 97110 THERAPEUTIC EXERCISES: CPT

## 2023-11-29 PROCEDURE — 70551 MRI BRAIN STEM W/O DYE: CPT

## 2023-11-29 PROCEDURE — 85027 COMPLETE CBC AUTOMATED: CPT | Performed by: NURSE PRACTITIONER

## 2023-11-29 PROCEDURE — 93306 TTE W/DOPPLER COMPLETE: CPT

## 2023-11-29 PROCEDURE — 97162 PT EVAL MOD COMPLEX 30 MIN: CPT

## 2023-11-29 PROCEDURE — 87086 URINE CULTURE/COLONY COUNT: CPT | Performed by: INTERNAL MEDICINE

## 2023-11-29 PROCEDURE — 97166 OT EVAL MOD COMPLEX 45 MIN: CPT

## 2023-11-29 PROCEDURE — 99232 SBSQ HOSP IP/OBS MODERATE 35: CPT | Performed by: INTERNAL MEDICINE

## 2023-11-29 PROCEDURE — 92610 EVALUATE SWALLOWING FUNCTION: CPT

## 2023-11-29 PROCEDURE — 70450 CT HEAD/BRAIN W/O DYE: CPT

## 2023-11-29 PROCEDURE — 71250 CT THORAX DX C-: CPT

## 2023-11-29 PROCEDURE — 83735 ASSAY OF MAGNESIUM: CPT | Performed by: NURSE PRACTITIONER

## 2023-11-29 PROCEDURE — 97535 SELF CARE MNGMENT TRAINING: CPT

## 2023-11-29 RX ADMIN — ATORVASTATIN CALCIUM 80 MG: 40 TABLET, FILM COATED ORAL at 20:16

## 2023-11-29 RX ADMIN — Medication 10 ML: at 04:23

## 2023-11-29 NOTE — THERAPY EVALUATION
Patient Name: Munira Escobar  : 1945    MRN: [id number]                              Today's Date: 2023       Admit Date: 2023    Visit Dx:     ICD-10-CM ICD-9-CM   1. Acute CVA (cerebrovascular accident)  I63.9 434.91   2. 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 Go)     HYSTERECTOMY        General Information       Row Name 23 1307          OT Time and Intention    Document Type evaluation  -JR     Mode of Treatment occupational therapy  -       Row Name 23 1307          General Information    Patient Profile Reviewed yes  -JR     Prior Level of Function independent:;gait;transfer;bed mobility;ADL's;home management  Per dtr, pt took care of everyone else.  recently got out of the hospital  -JR     Existing Precautions/Restrictions fall;other (see comments)  low HR  -JR     Barriers to Rehab medically complex;cognitive status  -JR       Row Name 23 1301          Living Environment    People in Home spouse;sibling(s)  -JR       Row Name 23 1307          Home Main Entrance    Number of Stairs, Main  Entrance one  -JR       Row Name 11/29/23 1307          Stairs Within Home, Primary    Number of Stairs, Within Home, Primary none  -JR       Row Name 11/29/23 1307          Cognition    Orientation Status (Cognition) oriented to;person  Pt confused, lethargic, unable to maintain alert level this date, limited command following  -JR       Row Name 11/29/23 1307          Safety Issues, Functional Mobility    Safety Issues Affecting Function (Mobility) ability to follow commands;awareness of need for assistance;insight into deficits/self-awareness;judgment;safety precaution awareness;safety precautions follow-through/compliance  -     Impairments Affecting Function (Mobility) balance;cognition;coordination;endurance/activity tolerance;strength;range of motion (ROM);postural/trunk control  -     Cognitive Impairments, Mobility Safety/Performance attention;awareness, need for assistance;insight into deficits/self-awareness;problem-solving/reasoning;safety precaution awareness;safety precaution follow-through  -JR               User Key  (r) = Recorded By, (t) = Taken By, (c) = Cosigned By      Initials Name Provider Type    JR Sylvia Claudio OT Occupational Therapist                     Mobility/ADL's       Row Name 11/29/23 1310          Bed Mobility    Bed Mobility supine-sit  -     Supine-Sit Wilkinson (Bed Mobility) maximum assist (25% patient effort);2 person assist;verbal cues  -JR     Bed Mobility, Safety Issues cognitive deficits limit understanding;decreased use of arms for pushing/pulling;decreased use of legs for bridging/pushing;impaired trunk control for bed mobility  -     Assistive Device (Bed Mobility) bed rails;draw sheet;head of bed elevated  -     Comment, (Bed Mobility) Pt reported feeling nausea with movement this date.  -       Row Name 11/29/23 1310          Transfers    Transfers bed-chair transfer  -       Row Name 11/29/23 1310          Bed-Chair Transfer    Bed-Chair  Eldred (Transfers) moderate assist (50% patient effort);2 person assist;verbal cues  -     Assistive Device (Bed-Chair Transfers) other (see comments)  B UE support  -     Comment, (Bed-Chair Transfer) Pt reported dizziness and seeing spots with transfer this date. Pt with limited alert level, and unable to maintain alert level. Bradycardia noted during initial eval. RN notified.  -       Row Name 11/29/23 1310          Activities of Daily Living    BADL Assessment/Intervention upper body dressing;lower body dressing  -       Row Name 11/29/23 1310          Upper Body Dressing Assessment/Training    E[place]red Level (Upper Body Dressing) don;front opening garment;maximum assist (25% patient effort)  -     Position (Upper Body Dressing) edge of bed sitting  -[facility] Name 11/29/23 1310          Lower Body Dressing Assessment/Training    Eldred Level (Lower Body Dressing) don;socks;dependent (less than 25% patient effort)  -     Position (Lower Body Dressing) supine  -               User Key  (r) = Recorded By, (t) = Taken By, (c) = Cosigned By      Initials Name Provider Type    Sylvia Viveros, OT Occupational Therapist                   Obj/Interventions       Row Name 11/29/23 1314          Sensory Assessment (Somatosensory)    Sensory Assessment Unable to assess due to decreased alert level  -[facility] Name 11/29/23 1314          Vision Assessment/Intervention    Vision Assessment Comment Pt keeping eyes closed throughout. Able to open eyes, but not able to maintain. Pt reported dizziness and seeing spots, however reported it had resolved after transfer to the chair  -[facility] Name 11/29/23 1314          Range of Motion Comprehensive    General Range of Motion no range of motion deficits identified  -[facility] Name 11/29/23 1314          Strength Comprehensive (MMT)    Comment, General Manual Muscle Testing (MMT) Assessment B UE functionally 3/5, pt not following  commands for MMT this date  -       Row Name 11/29/23 1314          Balance    Balance Assessment sitting static balance;standing dynamic balance  -JR     Static Sitting Balance minimal assist  -JR     Dynamic Standing Balance moderate assist;2-person assist  -JR     Position/Device Used, Standing Balance supported  -JR               User Key  (r) = Recorded By, (t) = Taken By, (c) = Cosigned By      Initials Name Provider Type    JR Sylvia Claudio OT Occupational Therapist                   Goals/Plan       Row Name 11/29/23 1320          Bed Mobility Goal 1 (OT)    Activity/Assistive Device (Bed Mobility Goal 1, OT) bed mobility activities, all  -JR     [place] Level/Cues Needed (Bed Mobility Goal 1, OT) minimum assist (75% or more patient effort);verbal cues required  -JR     Time Frame (Bed Mobility Goal 1, OT) long term goal (LTG);by discharge  -JR     Progress/Outcomes (Bed Mobility Goal 1, OT) new goal  -       Row Name 11/29/23 1320          Transfer Goal 1 (OT)    Activity/Assistive Device (Transfer Goal 1, OT) transfers, all  -JR     [place] Level/Cues Needed (Transfer Goal 1, OT) minimum assist (75% or more patient effort);verbal cues required  -JR     Time Frame (Transfer Goal 1, OT) long term goal (LTG);by discharge  -JR     Progress/Outcome (Transfer Goal 1, OT) new goal  -       Row Name 11/29/23 1320          Toileting Goal 1 (OT)    Activity/Device (Toileting Goal 1, OT) toileting skills, all  -JR     [place] Level/Cues Needed (Toileting Goal 1, OT) minimum assist (75% or more patient effort);verbal cues required  -JR     Time Frame (Toileting Goal 1, OT) long term goal (LTG);by discharge  -JR     Progress/Outcome (Toileting Goal 1, OT) new goal  -       Row Name 11/29/23 1320          Grooming Goal 1 (OT)    Activity/Device (Grooming Goal 1, OT) grooming skills, all  -JR     [place] (Grooming Goal 1, OT) minimum assist (75% or more patient effort);verbal cues  required  -JR     Time Frame (Grooming Goal 1, OT) long term goal (LTG);by discharge  -JR     Progress/Outcome (Grooming Goal 1, OT) new goal  -JR       Row Name 11/29/23 1320          Therapy Assessment/Plan (OT)    Planned Therapy Interventions (OT) activity tolerance training;BADL retraining;functional balance retraining;occupation/activity based interventions;ROM/therapeutic exercise;strengthening exercise;transfer/mobility retraining;patient/caregiver education/training;neuromuscular control/coordination retraining;cognitive/visual perception retraining  -               User Key  (r) = Recorded By, (t) = Taken By, (c) = Cosigned By      Initials Name Provider Type    JR Sylvia Claudio, OT Occupational Therapist                   Clinical Impression       Row Name 11/29/23 1316          Pain Assessment    Additional Documentation Pain Scale: FACES Pre/Post-Treatment (Group)  -       Row Name 11/29/23 1316          Pain Scale: FACES Pre/Post-Treatment    Pain: FACES Scale, Pretreatment 0-->no hurt  -JR     Posttreatment Pain Rating 0-->no hurt  -JR       Row Name 11/29/23 1316          Plan of Care Review    Plan of Care Reviewed With patient;daughter  -     Outcome Evaluation OT initial eval and expanded chart review completed. Pt presents with multiple comorbidities and decreased strength, alert level, balance, activity tolerance, and command following limiting independence with ADL's and mobility from reported baseline status. Recommend continued skilled OT services and transfer to IRF at d/c if deemed medically necessary  -       Row Name 11/29/23 1314          Therapy Assessment/Plan (OT)    Patient/Family Therapy Goal Statement (OT) pt unable to state goal this date  -     Rehab Potential (OT) good, to achieve stated therapy goals  -     Criteria for Skilled Therapeutic Interventions Met (OT) yes;meets criteria;skilled treatment is necessary  -     Therapy Frequency (OT) daily  -        Row Name 11/29/23 1316          Therapy Plan Review/Discharge Plan (OT)    Anticipated Discharge Disposition (OT) inpatient rehabilitation facility  -       Row Name 11/29/23 1316          Vital Signs    Pre Systolic BP Rehab 131  -JR     Pre Treatment Diastolic BP 54  -JR     Post Systolic BP Rehab 117  -JR     Post Treatment Diastolic BP 53  -JR     Pretreatment Heart Rate (beats/min) 67  -JR     Intratreatment Heart Rate (beats/min) 38   -JR     Posttreatment Heart Rate (beats/min) 54  -JR     Pre SpO2 (%) 94  -JR     O2 Delivery Pre Treatment room air  -JR     Post SpO2 (%) 96  -JR     O2 Delivery Post Treatment room air  -JR     Pre Patient Position Supine  -JR     Intra Patient Position Standing  -JR     Post Patient Position Sitting  -JR       Row Name 11/29/23 1316          Positioning and Restraints    Pre-Treatment Position in bed  -JR     Post Treatment Position chair  -JR     In Chair notified nsg;reclined;call light within reach;encouraged to call for assist;exit alarm on;with family/caregiver;waffle cushion  -               User Key  (r) = Recorded By, (t) = Taken By, (c) = Cosigned By      Initials Name Provider Type    Sylvia Viveros, OT Occupational Therapist                   Outcome Measures       Row Name 11/29/23 1321          How much help from another is currently needed...    Putting on and taking off regular lower body clothing? 1  -JR     Bathing (including washing, rinsing, and drying) 1  -JR     Toileting (which includes using toilet bed pan or urinal) 1  -JR     Putting on and taking off regular upper body clothing 2  -JR     Taking care of personal grooming (such as brushing teeth) 2  -JR     Eating meals 2  -JR     AM-PAC 6 Clicks Score (OT) 9  -JR       Row Name 11/29/23 1321          Modified Montgomery Scale    Modified Montgomery Scale 4 - Moderately severe disability.  Unable to walk without assistance, and unable to attend to own bodily needs without assistance.  -JR Camejo  Name 11/29/23 1321          Functional Assessment    Outcome Measure Options AM-PAC 6 Clicks Daily Activity (OT);Modified Montara  -               User Key  (r) = Recorded By, (t) = Taken By, (c) = Cosigned By      Initials Name Provider Type    Sylvia Viveros OT Occupational Therapist                    Occupational Therapy Education       Title: PT OT SLP Therapies (In Progress)       Topic: Occupational Therapy (In Progress)       Point: ADL training (In Progress)       Description:   Instruct learner(s) on proper safety adaptation and remediation techniques during self care or transfers.   Instruct in proper use of assistive devices.                  Learning Progress Summary             Patient Acceptance, E, NR by  at 11/29/2023 1130    Comment: Educated pt regarding role of therapy and ongoing treatment planning as well as d/c planning   Family Acceptance, E, NR by  at 11/29/2023 1130    Comment: Educated pt regarding role of therapy and ongoing treatment planning as well as d/c planning                         Point: Home exercise program (Not Started)       Description:   Instruct learner(s) on appropriate technique for monitoring, assisting and/or progressing therapeutic exercises/activities.                  Learner Progress:  Not documented in this visit.              Point: Precautions (Not Started)       Description:   Instruct learner(s) on prescribed precautions during self-care and functional transfers.                  Learner Progress:  Not documented in this visit.              Point: Body mechanics (Not Started)       Description:   Instruct learner(s) on proper positioning and spine alignment during self-care, functional mobility activities and/or exercises.                  Learner Progress:  Not documented in this visit.                              User Key       Initials Effective Dates Name Provider Type [facility] 02/03/23 -  Sylvia Claudio OT Occupational Therapist  OT                  OT Recommendation and Plan  Planned Therapy Interventions (OT): activity tolerance training, BADL retraining, functional balance retraining, occupation/activity based interventions, ROM/therapeutic exercise, strengthening exercise, transfer/mobility retraining, patient/caregiver education/training, neuromuscular control/coordination retraining, cognitive/visual perception retraining  Therapy Frequency (OT): daily  Plan of Care Review  Plan of Care Reviewed With: patient, daughter  Outcome Evaluation: OT initial eval and expanded chart review completed. Pt presents with multiple comorbidities and decreased strength, alert level, balance, activity tolerance, and command following limiting independence with ADL's and mobility from reported baseline status. Recommend continued skilled OT services and transfer to IRF at d/c if deemed medically necessary     Time Calculation:   Evaluation Complexity (OT)  Review Occupational Profile/Medical/Therapy History Complexity: expanded/moderate complexity  Assessment, Occupational Performance/Identification of Deficit Complexity: 5 or more performance deficits  Clinical Decision Making Complexity (OT): detailed assessment/moderate complexity  Overall Complexity of Evaluation (OT): moderate complexity     Time Calculation- OT       Row Name 11/29/23 1323             Time Calculation- OT    OT Start Time 1130  -JR      OT Received On 11/29/23  -JR      OT Goal Re-Cert Due Date 12/09/23  -JR         Timed Charges    74482 - OT Self Care/Mgmt Minutes 8  -JR         Untimed Charges    OT Eval/Re-eval Minutes 46  -JR         Total Minutes    Timed Charges Total Minutes 8  -JR      Untimed Charges Total Minutes 46  -JR       Total Minutes 54  -JR                User Key  (r) = Recorded By, (t) = Taken By, (c) = Cosigned By      Initials Name Provider Type    Sylvia Viveros, OT Occupational Therapist                  Therapy Charges for Today       Code  Description Service Date Service Provider Modifiers Qty    46912389065 HC OT SELF CARE/MGMT/TRAIN EA 15 MIN 11/29/2023 Shanon, Sylvia BOCANEGRA, OT GO 1    71705781857 HC OT EVAL MOD COMPLEXITY 4 11/29/2023 ShanonSylvia OT GO 1                 Sylvia SUN Shanon, OT  11/29/2023

## 2023-11-29 NOTE — PLAN OF CARE
Goal Outcome Evaluation:  Plan of Care Reviewed With: patient, daughter           Outcome Evaluation: OT initial eval and expanded chart review completed. Pt presents with multiple comorbidities and decreased strength, alert level, balance, activity tolerance, and command following limiting independence with ADL's and mobility from reported baseline status. Recommend continued skilled OT services and transfer to IRF at d/c if deemed medically necessary      Anticipated Discharge Disposition (OT): inpatient rehabilitation facility

## 2023-11-29 NOTE — CONSULTS
Stroke Consult Note    Patient Name: Munira Escobar   MRN: [id number]  Age: 78 y.o.  Sex: female  : 1945    Primary Care Physician: Eunice Mcwilliams MD  Referring Physician: Dr. Hernandez    TIME STROKE TEAM CALLED:  EST     TIME PATIENT SEEN: 2052 EST    Handedness: Right  Race: White    Chief Complaint/Reason for Consultation: Left facial droop, speech difficulty and left-sided weakness    HPI: Ms. Escobar is a 78-year-old female with PMH of HTN, HLD, CML, GERD and CAD.  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  Presenting symptoms include left facial droop, speech difficulty and left-sided weakness.  Patient is a poor historian and unable to give details of events leading up to ED visit.  Flight team reports  last spoke to patient in her normal state of health at 5 PM.  Mr. Escobar found his wife with presenting symptoms and notified EMS at 7:30 PM.  Patient was brought to [facility] ED for further evaluation and stroke work-up.    On arrival [facility] ED NIH 10.  Blood pressure 134/77.  On exam patient is able to answer 1 question appropriately and follow one-step commands.  Left facial droop with expressive/receptive aphasia and mild dysarthria noted during conversation.  No reported falls or recent trauma.  Patient is able to see finger movement in left eye and blinks to threat bilaterally.  Strength in bilateral lower extremities 3/5.  Neuro exam limited due to acuity of condition and aphasia.  Former smoker and no EtOH use.  Takes prescribed medications routinely.    Last Known Normal Date/Time: 2023 at 1700 EST     Review of Systems   Unable to perform ROS: Mental status change      Past Medical History:   Diagnosis Date    Acid reflux     Gout     History of leukemia     Hyperlipidemia     Hypertension     Hypothyroid     Leukemia     history of leukemia    Myocardial infarction     Per EKG     Vitamin D deficiency      Past Surgical History:   Procedure Laterality Date     APPENDECTOMY      CARDIAC CATHETERIZATION  2020    No stents (by Dr. Go)     HYSTERECTOMY       Family History   Problem Relation Age of Onset    Heart attack Father     Hypertension Father     Hyperlipidemia Father      Social History     Socioeconomic History    Marital status:    Tobacco Use    Smoking status: Former     Years: 45     Types: Cigarettes     Quit date: 1974     Years since quittin.4    Smokeless tobacco: Never   Substance and Sexual Activity    Alcohol use: No    Drug use: No     Allergies   Allergen Reactions    Morphine And Related Other (See Comments)     Extreme tiredness     Penicillins Swelling     Tongue and lips     Valsartan Headache    Sulfa Antibiotics GI Intolerance     Prior to Admission medications    Medication Sig Start Date End Date Taking? Authorizing Provider   aspirin 81 MG EC tablet Take 1 tablet by mouth Daily.    Pawan Oneill MD   atorvastatin (LIPITOR) 20 MG tablet Take 1 tablet by mouth Daily. 20   Pawan Oneill MD   cholecalciferol (VITAMIN D3) 25 MCG (1000 UT) tablet Take 1 tablet by mouth Daily.    Pawan Oneill MD   folic acid (FOLVITE) 1 MG tablet Take 1 tablet by mouth Daily.    Pawan Oneill MD   lansoprazole (PREVACID) 30 MG capsule Take 1 capsule by mouth Daily. 16   Pawan Oneill MD   levothyroxine (SYNTHROID, LEVOTHROID) 25 MCG tablet Take 1 tablet by mouth Daily.    Pawan Oneill MD   loratadine (CLARITIN) 10 MG tablet Take 1 tablet by mouth Daily.    Pawan Oneill MD   meclizine (ANTIVERT) 12.5 MG tablet As Needed. 3/30/21   Pawan Oneill MD   montelukast (SINGULAIR) 10 MG tablet Take 1 tablet by mouth Every Night. 20   Pawan Oneill MD   olmesartan (Benicar) 20 MG tablet Take 1 tablet by mouth Daily. 23   Michi Gamez APRN   potassium chloride (K-DUR) 10 MEQ CR tablet Take 1 tablet by mouth Daily. 20   Pawan Oneill MD          Temp:  [97.5 °F (36.4 °C)] 97.5 °F (36.4 °C)  Heart Rate:  [80-87] 80  Resp:  [14] 14  BP: (145-149)/(64-70) 145/64  Neurological Exam  Mental Status  Awake and alert. Oriented only to person. Mild dysarthria present. Expressive aphasia and receptive aphasia present.    Cranial Nerves  CN II: Right visual acuity: Counts fingers. Left visual acuity: Finger movement. Blinks to threat bilaterally.  Will track around room.  CN III, IV, VI: Extraocular movements intact bilaterally. Pupils equal round and reactive to light bilaterally.  CN VII:  Left: There is central facial weakness.  CN VIII: Hearing intact.  CN XI: Shoulder shrug strength is normal.  CN XII: Tongue midline without atrophy or fasciculations.  Neuro exam limited due to acuity of condition and aphasia..    Motor  Normal muscle bulk throughout. Normal muscle tone. Strength is 5/5 in all four extremities except as noted.  Bilateral lower extremity strength 3/5..    Sensory  Sensation: Moves all extremities to noxious stimuli.     Coordination    Unable to assess.    Gait    Unable to assess.      Physical Exam  Constitutional:       General: She is awake. She is not in acute distress.     Appearance: She is ill-appearing.   HENT:      Head: Normocephalic and atraumatic.      Nose: Nose normal.      Mouth/Throat:      Mouth: Mucous membranes are dry.   Eyes:      Extraocular Movements: Extraocular movements intact.      Pupils: Pupils are equal, round, and reactive to light.   Cardiovascular:      Pulses: Normal pulses.   Pulmonary:      Effort: Pulmonary effort is normal.   Abdominal:      General: Abdomen is flat.   Musculoskeletal:         General: Normal range of motion.      Cervical back: Normal range of motion.   Skin:     General: Skin is warm and dry.   Neurological:      Mental Status: She is alert. She is disoriented.      Cranial Nerves: Cranial nerve deficit and dysarthria present.      Motor: Weakness present.   Psychiatric:          Attention and Perception: She is inattentive.         Cognition and Memory: Cognition is impaired.         Acute Stroke Data    Thrombolytic Inclusion / Exclusion Criteria    Time: 21:47 EST  Person Administering Scale: PENNY Barcenas    Inclusion Criteria  [x]   18 years of age or greater   [x]   Onset of symptoms < 4.5 hours before beginning treatment (stroke onset = time patient was last seen well or without symptoms).   []   Diagnosis of acute ischemic stroke causing measurable disabling deficit (Complete Hemianopia, Any Aphasia, Visual or Sensory Extinction, Any weakness limiting sustained effort against gravity)   [x]   Any remaining deficit considered potentially disabling in view of patient and practitioner   Exclusion criteria (Do not proceed with Alteplase if any are checked under exclusion criteria)  []   Onset unknown or GREATER than 4.5 hours   []   ICH on CT/MRI   []   CT demonstrates hypodensity representing acute or subacute infarct   []   Significant head trauma or prior stroke in the previous 3 months   []   Symptoms suggestive of subarachnoid hemorrhage   []   History of un-ruptured intracranial aneurysm GREATER than 10 mm   []   Recent intracranial or intraspinal surgery within the last 3 months   []   Arterial puncture at a non-compressible site in the previous 7 days   []   Active internal bleeding   []   Acute bleeding tendency   []   Platelet count LESS than 100,000 for known hematological diseases such as leukemia, thrombocytopenia or chronic cirrhosis   []   Current use of anticoagulant with INR GREATER than 1.7 or PT GREATER than 15 seconds, aPTT GREATER than 40 seconds   []   Heparin received within 48 hours, resulting in abnormally elevated aPTT GREATER than upper limit of normal   []   Current use of direct thrombin inhibitors or direct factor Xa inhibitors in the past 48 hours   []   Elevated blood pressure refractory to treatment (systolic GREATER than 185 mm/Hg or diastolic   GREATER than 110 mm/Hg   []   Suspected infective endocarditis and aortic arch dissection   []   Current use of therapeutic treatment dose of low-molecular-weight heparin (LMWH) within the previous 24 hours   []   Structural GI malignancy or bleed   Relative exclusion for all patients  []   Only minor non-disabling symptoms   []   Pregnancy   []   Seizure at onset with postictal residual neurological impairments   []   Major surgery or previous trauma within past 14 days   []   History of previous spontaneous ICH, intracranial neoplasm, or AV malformation   []   Postpartum (within previous 14 days)   []   Recent GI or urinary tract hemorrhage (within previous 21 days)   []   Recent acute MI (within previous 3 months)   []   History of un-ruptured intracranial aneurysm LESS than 10 mm   []   History of ruptured intracranial aneurysm   []   Blood glucose LESS than 50 mg/dL (2.7 mmol/L)   []   Dural puncture within the last 7 days   []   Known GREATER than 10 cerebral microbleeds   Additional exclusions for patients with symptoms onset between 3 and 4.5 hours.  []   Age > 80.   []   On any anticoagulants regardless of INR  >>> Warfarin (Coumadin), Heparin, Enoxaparin (Lovenox), fondaparinux (Arixtra), bivalirudin (Angiomax), Argatroban, dabigatran (Pradaxa), rivaroxaban (Xarelto), or apixaban (Eliquis)   []   Severe stroke (NIHSS > 25).   []   History of BOTH diabetes and previous ischemic stroke.   [x]   The risks and benefits have been discussed with the patient or family related to the administration of IV thrombolytic therapy for stroke symptoms.   [x]   I have discussed and reviewed the patient's case and imaging with the attending prior to IV thrombolytic therapy.   2135 Time IV thrombolytic administered       Hospital Meds:  Scheduled- sodium chloride, 10 mL, Intravenous, Q5 Min      Infusions-     PRNs-   sodium chloride    Functional Status Prior to Current Stroke/Alcona Score:   MODIFIED ARNOL SCALE (to be  assessed for each patient having history of stroke) []Stroke history but not assessed  [x]0: No symptoms at all  []1: No significant disability despite symptoms  []2: Slight disability  []3: Moderate disability  []4: Moderately severe disability  []5: Severe disability  []6: Death        NIH Stroke Scale  Time: 21:47 EST  Person Administering Scale: PENNY Barcenas  Interval: baseline  1a. Level of Consciousness: 0-->Alert, keenly responsive  1b. LOC Questions: 1-->Answers one question correctly  1c. LOC Commands: 0-->Performs both tasks correctly  2. Best Gaze: 0-->Normal  3. Visual: 1-->Partial hemianopia  4. Facial Palsy: 1-->Minor paralysis (flattened nasolabial fold, asymmetry on smiling)  5a. Motor Arm, Left: 0-->No drift, limb holds 90 (or 45) degrees for full 10 secs  5b. Motor Arm, Right: 0-->No drift, limb holds 90 (or 45) degrees for full 10 secs  6a. Motor Leg, Left: 2-->Some effort against gravity, leg falls to bed by 5 secs, but has some effort against gravity  6b. Motor Leg, Right: 2-->Some effort against gravity, leg falls to bed by 5 secs, but has some effort against gravity  7. Limb Ataxia: 0-->Absent  8. Sensory: 0-->Normal, no sensory loss  9. Best Language: 2-->Severe aphasia, all communication is through fragmentary expression, great need for inference, questioning, and guessing by the listener. Range of information that can be exchanged is limited, listener carries burden of. . . (see row details)  10. Dysarthria: 1-->Mild-to-moderate dysarthria, patient slurs at least some words and, at worst, can be understood with some difficulty  11. Extinction and Inattention (formerly Neglect): 0-->No abnormality    Total (NIH Stroke Scale): 10       Results Reviewed:  I have personally reviewed current lab, radiology, and data and agree with results.    Results for orders placed in visit on 06/14/16    SCANNED - ECHOCARDIOGRAM     WBC   Date Value Ref Range Status   11/28/2023 8.15 3.40 - 10.80  10*3/mm3 Final     RBC   Date Value Ref Range Status   11/28/2023 4.49 3.77 - 5.28 10*6/mm3 Final     Hemoglobin   Date Value Ref Range Status   11/28/2023 12.8 12.0 - 15.9 g/dL Final     Hematocrit   Date Value Ref Range Status   11/28/2023 40.6 34.0 - 46.6 % Final     MCV   Date Value Ref Range Status   11/28/2023 90.4 79.0 - 97.0 fL Final     MCH   Date Value Ref Range Status   11/28/2023 28.5 26.6 - 33.0 pg Final     MCHC   Date Value Ref Range Status   11/28/2023 31.5 31.5 - 35.7 g/dL Final     RDW   Date Value Ref Range Status   11/28/2023 15.5 (H) 12.3 - 15.4 % Final     RDW-SD   Date Value Ref Range Status   11/28/2023 51.5 37.0 - 54.0 fl Final     MPV   Date Value Ref Range Status   11/28/2023 10.3 6.0 - 12.0 fL Final     Platelets   Date Value Ref Range Status   11/28/2023 212 140 - 450 10*3/mm3 Final     Neutrophil %   Date Value Ref Range Status   11/28/2023 80.7 (H) 42.7 - 76.0 % Final     Lymphocyte %   Date Value Ref Range Status   11/28/2023 9.8 (L) 19.6 - 45.3 % Final     Monocyte %   Date Value Ref Range Status   11/28/2023 8.8 5.0 - 12.0 % Final     Eosinophil %   Date Value Ref Range Status   11/28/2023 0.0 (L) 0.3 - 6.2 % Final     Basophil %   Date Value Ref Range Status   11/28/2023 0.2 0.0 - 1.5 % Final     Immature Grans %   Date Value Ref Range Status   11/28/2023 0.5 0.0 - 0.5 % Final     Neutrophils, Absolute   Date Value Ref Range Status   11/28/2023 6.57 1.70 - 7.00 10*3/mm3 Final     Lymphocytes, Absolute   Date Value Ref Range Status   11/28/2023 0.80 0.70 - 3.10 10*3/mm3 Final     Monocytes, Absolute   Date Value Ref Range Status   11/28/2023 0.72 0.10 - 0.90 10*3/mm3 Final     Eosinophils, Absolute   Date Value Ref Range Status   11/28/2023 0.00 0.00 - 0.40 10*3/mm3 Final     Basophils, Absolute   Date Value Ref Range Status   11/28/2023 0.02 0.00 - 0.20 10*3/mm3 Final     Immature Grans, Absolute   Date Value Ref Range Status   11/28/2023 0.04 0.00 - 0.05 10*3/mm3 Final     nRBC    Date Value Ref Range Status   11/28/2023 0.0 0.0 - 0.2 /100 WBC Final      Lab Results   Component Value Date    BUN 11 05/18/2022    CREATININE 0.58 (L) 05/18/2022    BCR 19 05/18/2022    K 4.1 05/18/2022    CO2 23 05/18/2022    CALCIUM 9.2 05/18/2022    ALBUMIN 4.7 05/18/2022    BILITOT 0.7 05/18/2022    AST 16 11/28/2023    ALT 13 11/28/2023    XR Chest 1 View    Result Date: 11/28/2023  Impression: Density in the left lung base which may represent a combination of atelectasis, pneumonia and/or small pleural effusion. Electronically Signed: Leopoldo Flood DO  11/28/2023 9:38 PM EST  Workstation ID: [id number]    CT Angiogram Head w AI Analysis of LVO    Result Date: 11/28/2023  1.No hemodynamically significant stenosis, large vessel cut or aneurysms in intracranial circulation 2.No hemodynamically significant stenosis, dissection or aneurysms in extracranial circulation. Electronically Signed: Ronald Barragan MD  11/28/2023 8:24 PM CST  Workstation ID: [id number]    CT Angiogram Neck    Result Date: 11/28/2023  1.No hemodynamically significant stenosis, large vessel cut or aneurysms in intracranial circulation 2.No hemodynamically significant stenosis, dissection or aneurysms in extracranial circulation. Electronically Signed: Ronald Barragan MD  11/28/2023 8:24 PM CST  Workstation ID: [id number]    CT CEREBRAL PERFUSION WITH & WITHOUT CONTRAST    Result Date: 11/28/2023   1. No evidence of core infarct nor ischemic brain at risk Electronically Signed: Ronald Barragan MD  11/28/2023 8:20 PM CST  Workstation ID: [id number]    CT Head Without Contrast Stroke Protocol    Result Date: 11/28/2023  Impression: No acute process identified. Electronically Signed: Ronald Barragan MD  11/28/2023 8:15 PM CST  Workstation ID: [id number]    Assessment/Plan:  Ms. Escobar is a 78-year-old female with PMH of HTN, HLD, CML, GERD and CAD.  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  Presenting symptoms include left facial  droop, speech difficulty and left-sided weakness.  Patient is a poor historian and unable to give details of events leading up to ED visit.  Flight team reports  last spoke to patient in her normal state of health at 5 PM.  Mr. Escobar found his wife with presenting symptoms and notified EMS at 7:30 PM.  Patient was brought to [facility] ED for further evaluation and stroke work-up. Patient was deemed a candidate for IV thrombolytic therapy and the patient's POA agreed to administer medication.  Patient is not a candidate for endovascular therapy due to no LVO on CT scans.    Antiplatelet PTA: Aspirin  Anticoagulant PTA: None        Left facial droop, speech difficulty and left-sided weakness  Differentials include TIA, CVA  Initiate TIA/CVA with IV thrombolytic therapy order set  N.p.o. until bedside dysphagia screening completed by RN  Activity as tolerated  Aspirin 325 mg p.o. daily after 24-hour CT head complete  24-hour CT head on 11/29/2023 at 2200  Atorvastatin 80 mg p.o. nightly  MRI brain without contrast routine  TTE routine  A1C, lipid panel routine  Post thrombolytic/bleeding precautions  Blood pressure goals, SBP less than 180  Diabetes educator to see if appropriate  PT/OT/SLP eval and treat  Case management to follow      Plan of care discussed with Dr. Hernandez, Peggy Al (patient POA) and bedside RN.  Stroke neurology will continue to follow.  Thank you for this consult.  Call with any questions or concerns.    Marco Matthews, PENNY  November 28, 2023  21:47 EST

## 2023-11-29 NOTE — PLAN OF CARE
Goal Outcome Evaluation:  Plan of Care Reviewed With: patient, daughter         SLP evaluation completed. Will address dysphagia. Please see note for further details and recommendations.          Anticipated Discharge Disposition (SLP): inpatient rehabilitation facility

## 2023-11-29 NOTE — H&P
INTENSIVIST   INITIAL HOSPITAL VISIT NOTE     Chief Complaint: Stroke symptoms; Transferred to [facility] ICU for post-tPA management     Subjective   FELISA Escobar is a 78 y.o. female former smoker with PMH of HTN, dyslipidemia, hypothyroidism, CML, and GERD who presents to [facility] ED on 11/28/23 via flight EMS for evaluation of dysarthria and left facial droop concerning for stroke.     LKW ~1930 per the . She presented with NIH 10. CT head, CTP, and CTA head/neck all negative. EKG shows NSR and -140s. She was evaluated by our stroke neurology services deemed a candidate for thrombolytic therapy and will be admitted to the ICU for higher level of care.     CXR does show a possible LLL infiltrate and blunting of the costophrenic angle.     Prior carotid duplex from 2016 did show bilateral 16-49% stenosis. Prior holter monitoring at this time was unrevealing for atrial fibrillation. TTE displayed an EF 60-65%, grade I diastolic function, and insignificant valvular disease. Agitated saline study was not performed.     Time spent: 8 minutes  Electronically signed by PENNY Fowler, 11/28/23, 10:20 PM EST.    PMH: She  has a past medical history of Acid reflux, Gout, History of leukemia, Hyperlipidemia, Hypertension, Hypothyroid, Leukemia, Myocardial infarction, and Vitamin D deficiency.   PSxH: She  has a past surgical history that includes Hysterectomy; Appendectomy; and Cardiac catheterization (03/31/2020).      Medications:  No current facility-administered medications on file prior to encounter.     Current Outpatient Medications on File Prior to Encounter   Medication Sig   • aspirin 81 MG EC tablet Take 1 tablet by mouth Daily.   • atorvastatin (LIPITOR) 20 MG tablet Take 1 tablet by mouth Daily.   • cholecalciferol (VITAMIN D3) 25 MCG (1000 UT) tablet Take 1 tablet by mouth Daily.   • folic acid (FOLVITE) 1 MG tablet Take 1 tablet by mouth Daily.   • lansoprazole (PREVACID) 30 MG capsule Take 1 capsule by  "mouth Daily.   • levothyroxine (SYNTHROID, LEVOTHROID) 25 MCG tablet Take 1 tablet by mouth Daily.   • loratadine (CLARITIN) 10 MG tablet Take 1 tablet by mouth Daily.   • meclizine (ANTIVERT) 12.5 MG tablet As Needed.   • montelukast (SINGULAIR) 10 MG tablet Take 1 tablet by mouth Every Night.   • olmesartan (Benicar) 20 MG tablet Take 1 tablet by mouth Daily.   • potassium chloride (K-DUR) 10 MEQ CR tablet Take 1 tablet by mouth Daily.       Allergies: She is allergic to morphine and related, penicillins, valsartan, and sulfa antibiotics.   FH: Her family history includes Heart attack in her father; Hyperlipidemia in her father; Hypertension in her father.   SH: She  reports that she quit smoking about 49 years ago. Her smoking use included cigarettes. She has never used smokeless tobacco. She reports that she does not drink alcohol and does not use drugs.     The patient's relevant past medical, surgical and social history were reviewed and updated in Epic as appropriate.     ROS (14): Fourteen point review of system performed and negative except for that mentioned in HPI.     Objective   O     Physical Examination:  Vital Signs: Blood pressure 143/67, pulse 79, temperature 97.5 °F (36.4 °C), temperature source Oral, resp. rate 15, height 152.4 cm (60\"), weight 53 kg (116 lb 13.5 oz), SpO2 97%.    General: The patient appears in no acute distress.  Hemodynamically stable.  Chest: Clear to auscultation bilaterally, No wheezing, rhonchi, or rales. Normal work of breathing. Equal   chest rise.  Cardiac: Regular rhythm, normal rate, S1S2 auscultated. No murmurs, rubs or gallops.   Abdomen: Soft, non-tender, non-distended, positive bowel sounds in all four quadrants.   Extremities: No lower extremity edema. No clubbing or cyanosis.  Neuro: Drowsy but awakes.  Intermittently speaks in brief sentences.  Moves bilateral lower extremities on commands; strength 3/5.  Minimal strength in bilateral upper extremities.  Pupils " reactive.  Protecting airway.    Lines, Drains & Airways       Active LDAs       Name Placement date Placement time Site Days    Peripheral IV 11/28/23 2101 Right Antecubital 11/28/23 2101  Antecubital  less than 1    Peripheral IV 11/28/23 2101 Left Antecubital 11/28/23 2101  Antecubital  less than 1    Peripheral IV 11/28/23 2102 Anterior;Right Forearm 11/28/23 2102  Forearm  less than 1             Results from last 7 days   Lab Units 11/28/23 2103   WBC 10*3/mm3 8.15   HEMOGLOBIN g/dL 12.8   MCV fL 90.4   PLATELETS 10*3/mm3 212         CrCl cannot be calculated (Patient's most recent lab result is older than the maximum 30 days allowed.).  Results from last 7 days   Lab Units 11/28/23 2104   ALT (SGPT) U/L 13   AST (SGOT) U/L 16     Respiratory (ie nasal cannula, HFNC, BiPAP, mechanical ventilation settings) support: Room air     CT perfusion with and without contrast (11/28/2023):   Radiology Impression:   - No evidence of core infarct nor ischemic brain at risk     CTA head/neck (11/28/2023):   Findings:   CTA NECK:   - Aortic arch: No aneurysm. No significant stenosis or occlusion of the major arch vessels.   - Left carotid system: No aneurysm, significant stenosis or occlusion. Minimal atherosclerotic disease of the bulb.   - Right carotid system: No aneurysm, significant stenosis or occlusion. Minimal atherosclerotic disease of the bulb.   - Vertebrobasilar system: The vertebral arteries arise from their respective subclavian arteries. No aneurysm, significant stenosis or occlusion.     CTA HEAD:   - Anterior circulation: No aneurysm, significant stenosis or occlusion.   - Posterior circulation: No aneurysm, significant stenosis or occlusion.   - Anatomic variants: None of significance.   - Venous sinuses: Patent.     Radiology Impression:   - No hemodynamically significant stenosis, large vessel cut or aneurysms in intracranial circulation   - No hemodynamically significant stenosis, dissection or  aneurysms in extracranial circulation.     CT head (11/28/2023):   Radiology Impression:   - No acute process identified.     I reviewed the patient's new laboratory and imaging results.  I independently reviewed the patient's new images.    Assessment & Plan    A / P     -Admit patient to the ICU  -Initiate CVA with TPA protocol  -Aspirin/statin  -Neurology consulted management of CVA per neurology  -Nicardipine for blood pressure control  -Sliding scale insulin as needed for blood sugar control, A1c pending  -PT/OT/ST  -SCDs for DVT prophylaxis  -Pepcid for GI prophylaxis  -AM labs    (+) COVID but no preceding symptoms per family at bedside with the exception of fatigue and malaise.  No known positive contacts though patient has been in and out of the healthcare setting for both herself and with  who was recently hospitalized for cardiac issues at OSH.  No recent fever, chills, night sweats, cough, congestion, dyspnea, chest pain, rash.  Will continue supportive care at this time.  Updated family concerning this finding and will have charge nurse to speak with them about visitation guidelines.    Plan of care and goals reviewed during interdisciplinary rounds.  I discussed the patient's findings and my recommendations with patient, family, nursing staff, and consulting provider    Time: Critical Care time spent in direct patient care: 30 minutes (excluding procedure time, if applicable) including high complexity decision making to assess, manipulate, and support vital organ system failure in this individual who has impairment of one or more vital organ systems such that there is a high probability of imminent or life threatening deterioration in the patient’s condition.

## 2023-11-29 NOTE — CONSULTS
Order for diabetes education per stroke protocol. A1c 5%. No history of diabetes noted per chart review. Does not qualify for OP stroke/diabetes class.

## 2023-11-29 NOTE — THERAPY EVALUATION
Patient Name: Munira Escobar  : 1945    MRN: [id number]                              Today's Date: 2023       Admit Date: 2023    Visit Dx:     ICD-10-CM ICD-9-CM   1. Acute CVA (cerebrovascular accident)  I63.9 434.91   2. 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 Go)     HYSTERECTOMY        General Information       Row Name 23 1121          Physical Therapy Time and Intention    Document Type evaluation  -MB     Mode of Treatment physical therapy  -MB       Row Name 23 1121          General Information    Patient Profile Reviewed yes  -MB     Prior Level of Function independent:;ADL's;bed mobility;transfer;gait;driving;home management  Pt. lethargic; history provided by dtr. PTA pt. was independent w/ community ambulation w/out AD.  -MB     Existing Precautions/Restrictions fall;other (see comments)  bradycardia, contact & airborne precautions  -MB     Barriers to Rehab medically complex;cognitive status  -MB       Row Name 23 1121          Living Environment    People in Home spouse;sibling(s)  -MB       Row  Name 11/29/23 1121          Home Main Entrance    Number of Stairs, Main Entrance one  -MB     Stair Railings, Main Entrance none  -MB       Row Name 11/29/23 1121          Stairs Within Home, Primary    Number of Stairs, Within Home, Primary none  -MB       Row Name 11/29/23 1121          Cognition    Orientation Status (Cognition) oriented to;person  Pt. lethargic w/ difficulty following commands.  -MB       Row Name 11/29/23 1121          Safety Issues, Functional Mobility    Safety Issues Affecting Function (Mobility) ability to follow commands;awareness of need for assistance;insight into deficits/self-awareness;judgment;problem-solving;safety precaution awareness;safety precautions follow-through/compliance;sequencing abilities  -MB     Impairments Affecting Function (Mobility) balance;cognition;coordination;endurance/activity tolerance;strength;range of motion (ROM);postural/trunk control  -MB     Cognitive Impairments, Mobility Safety/Performance attention;awareness, need for assistance;insight into deficits/self-awareness;judgment;problem-solving/reasoning;safety precaution awareness;safety precaution follow-through;sequencing abilities  -MB               User Key  (r) = Recorded By, (t) = Taken By, (c) = Cosigned By      Initials Name Provider Type    MB Camilla Sosa, PT Physical Therapist                   Mobility       Row Name 11/29/23 1419          Bed Mobility    Bed Mobility supine-sit  -MB     Supine-Sit Falls (Bed Mobility) maximum assist (25% patient effort);2 person assist;verbal cues  -MB     Assistive Device (Bed Mobility) bed rails;draw sheet;head of bed elevated  -MB     Comment, (Bed Mobility) Pt. required assist to manage BLEs, raise trunk/shoulders, and scoot hips forward to EOB. Pt. c/o increased dizziness in sitting requiring seated rest.  -MB       Row Name 11/29/23 1419          Transfers    Comment, (Transfers) Pt. required assist for set up and consistent cueing for  sequencing. No knee buckling noted.  -MB       Row Name 11/29/23 1419          Bed-Chair Transfer    Bed-Chair [place] (Transfers) moderate assist (50% patient effort);2 person assist;verbal cues  -MB     Assistive Device (Bed-Chair Transfers) other (see comments)  BUE support  -MB       Row Name 11/29/23 1419          Sit-Stand Transfer    Sit-Stand [place] (Transfers) moderate assist (50% patient effort);2 person assist  -MB     Assistive Device (Sit-Stand Transfers) other (see comments)  BUE support  -MB       Row Name 11/29/23 1419          Gait/Stairs (Locomotion)    [place] Level (Gait) moderate assist (50% patient effort);2 person assist;verbal cues;nonverbal cues (demo/gesture)  -MB     Assistive Device (Gait) other (see comments)  BUE support  -MB     Distance in Feet (Gait) 2  -MB     Deviations/Abnormal Patterns (Gait) base of support, narrow;karlee decreased;gait speed decreased;stride length decreased;weight shifting decreased  -MB     Bilateral Gait Deviations forward flexed posture;heel strike decreased  -MB     Comment, (Gait/Stairs) Pt. ambulated to chair w/ step to, shuffling gait pattern w/ increased forward flexion. She required consistent cueing for upright posture and step sequence. Gait distance limited by fatigue/weakness/lethargy.  -MB               User Key  (r) = Recorded By, (t) = Taken By, (c) = Cosigned By      Initials Name Provider Type    Camilla Claudio, PT Physical Therapist                   Obj/Interventions       Row Name 11/29/23 1422          Range of Motion Comprehensive    General Range of Motion no range of motion deficits identified  -MB       Row Name 11/29/23 1422          Strength Comprehensive (MMT)    General Manual Muscle Testing (MMT) Assessment lower extremity strength deficits identified;upper extremity strength deficits identified  -MB     Comment, General Manual Muscle Testing (MMT) Assessment Difficult to formally test d/t cognitive  status. BLEs appear functionally 4-/5.  -MB       Row Name 11/29/23 1422          Balance    Balance Assessment sitting static balance;standing static balance;standing dynamic balance  -MB     Static Sitting Balance minimal assist  -MB     Position, Sitting Balance unsupported;sitting edge of bed  -MB     Static Standing Balance moderate assist;2-person assist  -MB     Dynamic Standing Balance moderate assist;2-person assist  -MB     Position/Device Used, Standing Balance other (see comments);supported  BUE support  -MB     Balance Interventions standing;sit to stand;weight shifting activity  -MB       Row Name 11/29/23 1422          Sensory Assessment (Somatosensory)    Sensory Assessment (Somatosensory) unable/difficult to assess  -MB               User Key  (r) = Recorded By, (t) = Taken By, (c) = Cosigned By      Initials Name Provider Type    Camilla Claudio, PT Physical Therapist                   Goals/Plan       Row Name 11/29/23 1429          Bed Mobility Goal 1 (PT)    Activity/Assistive Device (Bed Mobility Goal 1, PT) sit to supine/supine to sit  -MB     [place] Level/Cues Needed (Bed Mobility Goal 1, PT) minimum assist (75% or more patient effort)  -MB     Time Frame (Bed Mobility Goal 1, PT) 10 days  -MB       Row Name 11/29/23 1429          Transfer Goal 1 (PT)    Activity/Assistive Device (Transfer Goal 1, PT) sit-to-stand/stand-to-sit;bed-to-chair/chair-to-bed;walker, rolling  -MB     [place] Level/Cues Needed (Transfer Goal 1, PT) minimum assist (75% or more patient effort)  -MB     Time Frame (Transfer Goal 1, PT) 10 days  -MB       Row Name 11/29/23 1429          Gait Training Goal 1 (PT)    Activity/Assistive Device (Gait Training Goal 1, PT) gait (walking locomotion);walker, rolling;improve balance and speed;increase endurance/gait distance  -MB     [place] Level (Gait Training Goal 1, PT) minimum assist (75% or more patient effort)  -MB     Distance (Gait Training Goal 1,  PT) 50  -MB     Time Frame (Gait Training Goal 1, PT) 10 days  -MB       Row Name 11/29/23 1429          Stairs Goal 1 (PT)    Activity/Assistive Device (Stairs Goal 1, PT) ascending stairs;descending stairs;walker, rolling  -MB     [place] Level/Cues Needed (Stairs Goal 1, PT) minimum assist (75% or more patient effort)  -MB     Number of Stairs (Stairs Goal 1, PT) 1  -MB     Time Frame (Stairs Goal 1, PT) by discharge  -MB       Row Name 11/29/23 1429          Therapy Assessment/Plan (PT)    Planned Therapy Interventions (PT) balance training;bed mobility training;gait training;home exercise program;motor coordination training;patient/family education;postural re-education;stair training;strengthening;transfer training  -MB               User Key  (r) = Recorded By, (t) = Taken By, (c) = Cosigned By      Initials Name Provider Type    MB Camilla Sosa, PT Physical Therapist                   Clinical Impression       Row Name 11/29/23 1425          Pain Scale: FACES Pre/Post-Treatment    Pain: FACES Scale, Pretreatment 0-->no hurt  -MB     Posttreatment Pain Rating 0-->no hurt  -MB       Row Name 11/29/23 1425          Plan of Care Review    Plan of Care Reviewed With patient;caregiver  -MB     Progress no change  -MB     Outcome Evaluation PT eval completed. Patient presents w/ decreased strength, impaired balance, gait instability, decreased activity tolerance and command following, and is below her functional baseline. She required mod A x 2 for transfers and taking steps. Pt. would benefit from acute PT to promote return to PLOF. Recommend IP rehab at D/C.  -MB       Row Name 11/29/23 1425          Therapy Assessment/Plan (PT)    Patient/Family Therapy Goals Statement (PT) Pt. u/a to state.  -MB     Rehab Potential (PT) fair, will monitor progress closely  -MB     Criteria for Skilled Interventions Met (PT) yes;meets criteria;skilled treatment is necessary  -MB     Therapy Frequency (PT) daily  -MB        Row Name 11/29/23 1425          Vital Signs    Pre Systolic BP Rehab 131  -MB     Pre Treatment Diastolic BP 54  -MB     Post Systolic BP Rehab 117  -MB     Post Treatment Diastolic BP 53  -MB     Pretreatment Heart Rate (beats/min) 71  -MB     Intratreatment Heart Rate (beats/min) 38   RN aware and monitoring.  -MB     Posttreatment Heart Rate (beats/min) 54  -MB     Pre SpO2 (%) 94  -MB     O2 Delivery Pre Treatment room air  -MB     O2 Delivery Intra Treatment room air  -MB     Post SpO2 (%) 96  -MB     O2 Delivery Post Treatment room air  -MB     Pre Patient Position Supine  -MB     Intra Patient Position Standing  -MB     Post Patient Position Sitting  -MB       Row Name 11/29/23 1425          Positioning and Restraints    Pre-Treatment Position in bed  -MB     Post Treatment Position chair  -MB     In Chair notified nsg;reclined;call light within reach;encouraged to call for assist;exit alarm on;with family/caregiver;on mechanical lift sling;waffle cushion;legs elevated;L heel elevated  -MB               User Key  (r) = Recorded By, (t) = Taken By, (c) = Cosigned By      Initials Name Provider Type    Camilla Claudio, PT Physical Therapist                   Outcome Measures       Row Name 11/29/23 1430          How much help from another person do you currently need...    Turning from your back to your side while in flat bed without using bedrails? 2  -MB     Moving from lying on back to sitting on the side of a flat bed without bedrails? 2  -MB     Moving to and from a bed to a chair (including a wheelchair)? 2  -MB     Standing up from a chair using your arms (e.g., wheelchair, bedside chair)? 2  -MB     Climbing 3-5 steps with a railing? 1  -MB     To walk in hospital room? 2  -MB     AM-PAC 6 Clicks Score (PT) 11  -MB     Highest Level of Mobility Goal 4 --> Transfer to chair/commode  -MB       Row Name 11/29/23 1430 11/29/23 1321       Modified [place] Scale    Pre-Stroke Modified [place] Scale  4 - Moderately severe disability.  Unable to walk without assistance, and unable to attend to own bodily needs without assistance.  -MB --    Modified [place] Scale -- 4 - Moderately severe disability.  Unable to walk without assistance, and unable to attend to own bodily needs without assistance.  -      Row Name 11/29/23 1430 11/29/23 1321       Functional Assessment    Outcome Measure Options AM-PAC 6 Clicks Basic Mobility (PT)  -MB AM-PAC 6 Clicks Daily Activity (OT);Modified [place]  -JR              User Key  (r) = Recorded By, (t) = Taken By, (c) = Cosigned By      Initials Name Provider Type    JR Sylvia Claudio, OT Occupational Therapist    Camilla Claudio, PT Physical Therapist                                 Physical Therapy Education       Title: PT OT SLP Therapies (In Progress)       Topic: Physical Therapy (In Progress)       Point: Mobility training (In Progress)       Learning Progress Summary             Patient Acceptance, E,D, NR by MB at 11/29/2023 1431   Family Acceptance, E,D, NR by MB at 11/29/2023 1431                         Point: Home exercise program (In Progress)       Learning Progress Summary             Patient Acceptance, E,D, NR by MB at 11/29/2023 1431   Family Acceptance, E,D, NR by MB at 11/29/2023 1431                         Point: Body mechanics (In Progress)       Learning Progress Summary             Patient Acceptance, E,D, NR by MB at 11/29/2023 1431   Family Acceptance, E,D, NR by MB at 11/29/2023 1431                         Point: Precautions (In Progress)       Learning Progress Summary             Patient Acceptance, E,D, NR by MB at 11/29/2023 1431   Family Acceptance, E,D, NR by MB at 11/29/2023 1431                                         User Key       Initials Effective Dates Name Provider Type Discipline    MB 06/16/21 -  Camilla Sosa, PT Physical Therapist PT                  PT Recommendation and Plan  Planned Therapy Interventions (PT):  balance training, bed mobility training, gait training, home exercise program, motor coordination training, patient/family education, postural re-education, stair training, strengthening, transfer training  Plan of Care Reviewed With: patient, caregiver  Progress: no change  Outcome Evaluation: PT eval completed. Patient presents w/ decreased strength, impaired balance, gait instability, decreased activity tolerance and command following, and is below her functional baseline. She required mod A x 2 for transfers and taking steps. Pt. would benefit from acute PT to promote return to PLOF. Recommend IP rehab at D/C.     Time Calculation:   PT Evaluation Complexity  History, PT Evaluation Complexity: 1-2 personal factors and/or comorbidities  Examination of Body Systems (PT Eval Complexity): total of 3 or more elements  Clinical Presentation (PT Evaluation Complexity): evolving  Clinical Decision Making (PT Evaluation Complexity): moderate complexity  Overall Complexity (PT Evaluation Complexity): moderate complexity     PT Charges       Row Name 11/29/23 1431             Time Calculation    Start Time 1121  -MB      PT Received On 11/29/23  -MB      PT Goal Re-Cert Due Date 12/09/23  -MB         Timed Charges    75318 - PT Therapeutic Exercise Minutes 11  -MB         Untimed Charges    PT Eval/Re-eval Minutes 46  -MB         Total Minutes    Timed Charges Total Minutes 11  -MB      Untimed Charges Total Minutes 46  -MB       Total Minutes 57  -MB                User Key  (r) = Recorded By, (t) = Taken By, (c) = Cosigned By      Initials Name Provider Type    Camilla Claudio, PT Physical Therapist                  Therapy Charges for Today       Code Description Service Date Service Provider Modifiers Qty    70862642153 HC PT THER PROC EA 15 MIN 11/29/2023 Camilla Sosa, PT GP 1    78161916167 HC PT EVAL MOD COMPLEXITY 4 11/29/2023 Camilla Sosa, PT GP 1            PT G-Codes  Outcome Measure  Options: AM-PAC 6 Clicks Basic Mobility (PT)  AM-PAC 6 Clicks Score (PT): 11  AM-PAC 6 Clicks Score (OT): 9  Modified Renee Scale: 4 - Moderately severe disability.  Unable to walk without assistance, and unable to attend to own bodily needs without assistance.  PT Discharge Summary  Anticipated Discharge Disposition (PT): inpatient rehabilitation facility    Camilla Sosa, PT  11/29/2023

## 2023-11-29 NOTE — PLAN OF CARE
Goal Outcome Evaluation:  Plan of Care Reviewed With: patient, caregiver        Progress: no change  Outcome Evaluation: PT eval completed. Patient presents w/ decreased strength, impaired balance, gait instability, decreased activity tolerance and command following, and is below her functional baseline. She required mod A x 2 for transfers and taking steps. Pt. would benefit from acute PT to promote return to PLOF. Recommend IP rehab at D/C.      Anticipated Discharge Disposition (PT): inpatient rehabilitation facility

## 2023-11-29 NOTE — THERAPY EVALUATION
Acute Care - Speech Language Pathology   Swallow Initial Evaluation [facility]  Clinical Swallow Evaluation      Patient Name: Munira Escobar  : 1945  MRN: [id number]  Today's Date: 2023               Admit Date: 2023    Visit Dx:     ICD-10-CM ICD-9-CM   1. Acute CVA (cerebrovascular accident)  I63.9 434.91   2. 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 Go)     HYSTERECTOMY         SLP Recommendation and Plan  SLP Swallowing Diagnosis: suspected pharyngeal dysphagia (23)  SLP Diet Recommendation: soft to chew textures, chopped, no mixed consistencies, nectar thick liquids (23)  Recommended Precautions and Strategies: upright posture during/after eating, general aspiration precautions (23)  SLP Rec. for Method of Medication Administration: meds whole, meds crushed, with thick liquids, with puree, as tolerated (23)     Monitor for Signs of Aspiration: notify SLP if any concerns (23)  Recommended Diagnostics: reassess via clinical swallow evaluation, SLE/Cog/Motor Speech  Evaluation, other (see comments) (u/a to complete cog-comm eval this AM as EEG setting up to begin exam) (11/29/23 0915)  Swallow Criteria for Skilled Therapeutic Interventions Met: demonstrates skilled criteria (11/29/23 0915)  Anticipated Discharge Disposition (SLP): inpatient rehabilitation facility (11/29/23 0915)  Rehab Potential/Prognosis, Swallowing: good, to achieve stated therapy goals (11/29/23 0915)     Predicted Duration Therapy Intervention (Days): until discharge (11/29/23 0915)  Oral Care Recommendations: Oral Care BID/PRN, Toothbrush (11/29/23 0915)                                      Oral Care Recommendations: Oral Care BID/PRN, Toothbrush (11/29/23 0915)    Plan of Care Reviewed With: patient, daughter      SWALLOW EVALUATION (last 72 hours)       SLP Adult Swallow Evaluation       Row Name 11/29/23 0915                   Rehab Evaluation    Document Type evaluation  -MP        Subjective Information no complaints  -MP        Patient Observations alert  fairly cooperative  -MP        Patient/Family/Caregiver Comments/Observations Dtr present  -MP        Patient Effort adequate  -MP           General Information    Patient Profile Reviewed yes  -MP        Pertinent History Of Current Problem Adm 11/28 w/ dysarthria, L facial droop, s/p TNK. COVID+. Hx HTN, HLD, CML, GERD, CAD.  -MP        Current Method of Nutrition NPO  -MP        Precautions/Limitations, Vision WFL  -MP        Precautions/Limitations, Hearing WFL  -MP        Prior Level of Function-Communication WFL  -MP        Prior Level of Function-Swallowing no diet consistency restrictions  -MP        Plans/Goals Discussed with patient;family;agreed upon  -MP        Barriers to Rehab none identified  -MP        Patient's Goals for Discharge patient did not state  -MP        Family Goals for Discharge family did not state  -MP           Pain    Additional Documentation Pain Scale: FACES Pre/Post-Treatment (Group)  -MP           Pain Scale:  FACES Pre/Post-Treatment    Pain: FACES Scale, Pretreatment 0-->no hurt  -MP        Posttreatment Pain Rating 0-->no hurt  -MP           Oral Motor Structure and Function    Dentition Assessment edentulous, dentures not available  -MP        Secretion Management WNL/WFL  -MP        Mucosal Quality moist, healthy  -MP           Oral Musculature and Cranial Nerve Assessment    Oral Motor General Assessment unable to assess  -MP        Oral Labial or Buccal Impairment, Detail, Cranial Nerve VII (Facial): left labial droop  -MP           General Eating/Swallowing Observations    Eating/Swallowing Skills fed by SLP  -MP        Positioning During Eating upright in bed  -MP        Utensils Used spoon;cup;straw  -MP        Consistencies Trialed thin liquids;nectar/syrup-thick liquids;pureed;soft to chew textures  -MP           Clinical Swallow Eval    Pharyngeal Phase suspected pharyngeal impairment  -        Clinical Swallow Evaluation Summary ?able wet vocal quality after multiple sips of thin liquid. No s/sxs w/ nectar-thick, puree, or soft solid. U/a to perform FEES until 24 hrs post-TNK 2' bleeding risk. For now will initiate soft/chopped diet, no mixed consistencies, and nectar-thick liquids. Will plan for repeat clinical swallow eval tomorrow AM to determine if need to proceed w/ FEES.  -MP           Pharyngeal Phase Concerns    Pharyngeal Phase Concerns wet vocal quality  -MP        Wet Vocal Quality thin  -MP           SLP Evaluation Clinical Impression    SLP Swallowing Diagnosis suspected pharyngeal dysphagia  -MP        Functional Impact risk of aspiration/pneumonia  -MP        Rehab Potential/Prognosis, Swallowing good, to achieve stated therapy goals  -        Swallow Criteria for Skilled Therapeutic Interventions Met demonstrates skilled criteria  -MP           Recommendations    Predicted Duration Therapy Intervention (Days) until discharge  -MP        SLP Diet Recommendation soft to chew  textures;chopped;no mixed consistencies;nectar thick liquids  -        Recommended Diagnostics reassess via clinical swallow evaluation;SLE/Cog/Motor Speech Evaluation;other (see comments)  u/a to complete cog-comm eval this AM as EEG setting up to begin exam  -MP        Recommended Precautions and Strategies upright posture during/after eating;general aspiration precautions  -MP        Oral Care Recommendations Oral Care BID/PRN;Toothbrush  -MP        SLP Rec. for Method of Medication Administration meds whole;meds crushed;with thick liquids;with puree;as tolerated  -        Monitor for Signs of Aspiration notify SLP if any concerns  -        Anticipated Discharge Disposition (SLP) inpatient rehabilitation facility  -                  User Key  (r) = Recorded By, (t) = Taken By, (c) = Cosigned By      Initials Name Effective Dates    Rasheed Travis MS CCC-SLP 12/28/21 -                     EDUCATION  The patient has been educated in the following areas:   Dysphagia (Swallowing Impairment) Modified Diet Instruction.              Time Calculation:    Time Calculation- SLP       Row Name 11/29/23 0956             Time Calculation- SLP    SLP Start Time 0915  -      SLP Received On 11/29/23  -         Untimed Charges    80144-IQ Eval Oral Pharyng Swallow Minutes 40  -MP         Total Minutes    Untimed Charges Total Minutes 40  -MP       Total Minutes 40  -MP                User Key  (r) = Recorded By, (t) = Taken By, (c) = Cosigned By      Initials Name Provider Type    Rasheed Travis MS CCC-SLP Speech and Language Pathologist                    Therapy Charges for Today       Code Description Service Date Service Provider Modifiers Qty    [id number] HC ST EVAL ORAL PHARYNG SWALLOW 3 11/29/2023 Rasheed West MS CCC-SLP GN 1                 MS BRUCE Serrano  11/29/2023

## 2023-11-29 NOTE — ED PROVIDER NOTES
Subjective   History of Present Illness  This is a 78-year-old female with past medical history of CML and hypertension presenting to the emergency department with subacute onset slurred speech, facial droop and left-sided weakness.  Patient was flown in via [facility] secondary to acute onset of symptoms.  Apparently the  was there and witnessed the patient having acute change in her mental status and symptoms.  He noticed that she was slurring her speech and was having difficulties moving her left side.  Patient is no history of acute CVA before in the past.  On initial evaluation, patient is able to answer questions, she does have some slurring speech.  She denies any headache or change in vision.  No abdominal pain or vomiting.  No chest pain or shortness of breath.    History provided by:  Patient   used: No        Review of Systems   Constitutional:  Negative for chills and fever.   HENT:  Negative for congestion, ear pain and sore throat.    Eyes:  Negative for visual disturbance.   Respiratory:  Negative for shortness of breath.    Cardiovascular:  Negative for chest pain.   Gastrointestinal:  Negative for abdominal pain.   Genitourinary:  Negative for difficulty urinating.   Musculoskeletal:  Negative for arthralgias.   Skin:  Negative for rash.   Neurological:  Positive for speech difficulty and weakness. Negative for dizziness and numbness.   Psychiatric/Behavioral:  Negative for agitation.        Past Medical History:   Diagnosis Date    Acid reflux     Gout     History of leukemia     Hyperlipidemia     Hypertension     Hypothyroid     Leukemia     history of leukemia    Myocardial infarction     Per EKG     Vitamin D deficiency        Allergies   Allergen Reactions    Morphine And Related Other (See Comments)     Extreme tiredness     Penicillins Swelling     Tongue and lips     Valsartan Headache    Sulfa Antibiotics GI Intolerance       Past Surgical History:   Procedure  Laterality Date    APPENDECTOMY      CARDIAC CATHETERIZATION  2020    No stents (by Dr. Go)     HYSTERECTOMY         Family History   Problem Relation Age of Onset    Heart attack Father     Hypertension Father     Hyperlipidemia Father        Social History     Socioeconomic History    Marital status:    Tobacco Use    Smoking status: Former     Years: 45     Types: Cigarettes     Quit date: 1974     Years since quittin.4    Smokeless tobacco: Never   Substance and Sexual Activity    Alcohol use: No    Drug use: No           Objective   Physical Exam  Vitals and nursing note reviewed.   Constitutional:       General: She is not in acute distress.     Appearance: She is not ill-appearing or toxic-appearing.   HENT:      Mouth/Throat:      Pharynx: No posterior oropharyngeal erythema.   Eyes:      Conjunctiva/sclera: Conjunctivae normal.      Pupils: Pupils are equal, round, and reactive to light.   Cardiovascular:      Rate and Rhythm: Normal rate and regular rhythm.   Pulmonary:      Effort: Pulmonary effort is normal. No respiratory distress.   Abdominal:      General: Abdomen is flat. There is no distension.      Palpations: There is no mass.      Tenderness: There is no abdominal tenderness. There is no guarding or rebound.   Musculoskeletal:         General: No deformity. Normal range of motion.   Skin:     General: Skin is warm.      Findings: No rash.   Neurological:      Mental Status: She is alert and oriented to person, place, and time.      Comments: Patient does have some left-sided lower facial droop as well as some slurred speech, slight weakness in the bilateral lower extremities.  Sensation intact         ECG 12 Lead      Date/Time: 2023 10:03 PM    Performed by: Jc Hernandez MD  Authorized by: Jc Hernandez MD  Interpreted by physician  Previous ECG: no previous ECG available  Rhythm: sinus rhythm  Rate: normal  BPM: 85  QRS axis: normal  Conduction:  conduction normal  Other findings comments: Nonspecific ST changes  Clinical impression: non-specific ECG  Comments: EKG was directly visualized by myself, interpretations as documented in hospital course.               ED Course  ED Course as of 11/28/23 2206 Tue Nov 28, 2023 2122 CT Head Without Contrast Stroke Protocol  Imaging was directly visualized by myself, per my interpretations, CT of the head was normal.. [JK]   2123 Did have a long discussion with the patient's daughter regarding her current presentation.  Patient continues to have neurologic deficits after CT scanning.  There is concern for possible CVA.  Patient's daughter was made aware of both the benefits and risks of administering thrombolytic therapy.  Stroke team was available for further questioning as well.  Family member would like to proceed with all means necessary to improve patient's symptoms regardless of possible bleeding risk.  We will proceed with IV thrombolytics. [JK]   2204 BP: 149/70 [JK]   2204 Temp: 97.5 °F (36.4 °C) [JK]   2204 Temp src: Oral [JK]   2204 Heart Rate: 80 [JK]   2204 SpO2: 97 %  Patient's repeat vitals, telemetry tracing, and pulse oximetry tracing were directly viewed and interpreted by myself.  Patient is hemodynamically stable [JK]   2205 CBC & Differential(!) [JK]   2205 ALT [JK]   2205 Single High Sensitivity Troponin T [JK]   2205 aPTT [JK]   2205 AST  Laboratory studies were reviewed and interpreted directly by myself.  CBC was normal, ALT normal, troponin normal, AST normal [JK]   2205 CT CEREBRAL PERFUSION WITH & WITHOUT CONTRAST [JK]   2205 CT Angiogram Head w AI Analysis of LVO [JK]   2205 CT Angiogram Neck  Imaging was directly visualized by myself, per my interpretations, CT angiogram of the head, neck and perfusion did not show any large vessel occlusion. [JK]   2205 On reevaluation, patient still has deficits.  We are continuing with TNK infusion.  Patient will be admitted to the ICU in critical  condition. [JK]   2852 Shared decision making:   After full review of the patient's clinical presentation, review of any work-up including but not limited to laboratory studies and radiology obtained, I had a discussion with the patient's family.  Treatment options were discussed as well as the risks, benefits and consequences.  I discussed all findings with the patient's family.  During the discussion, treatment goals were understood by all as well as any misconceptions which were addressed with the patient's family.  Ample time was given for any questions they may have had.  They are in agreement with the treatment plan as well as final disposition.   [JK]      ED Course User Index  [JK] Jc Hernandez MD                          Total (NIH Stroke Scale): 10                  Medical Decision Making  This is a 78-year-old female with a history of hypertension and CML presented to the emergency department with some facial droop, slurred speech and left-sided weakness.  Patient's onset of symptoms were at 1900.  Symptoms are consistent with acute CVA.  Patient was immediately transferred to CT scanner code stroke was initiated.  Stroke team was at bedside.  Workup initiated    Patient's initial NIH stroke scale was 10      Differential diagnosis: CVA, TIA, intracranial mass, subarachnoid, anemia, acute kidney injury, electrolyte abnormality      Amount and/or Complexity of Data Reviewed  External Data Reviewed: labs, radiology and notes.     Details: External laboratories, imaging as well as notes were reviewed personally by myself.  All relevant studies were used to guide decision making.     Date of previous record: 6/16/2023    Source of note: Primary oncology    Summary: Patient was evaluated for routine visit for CML.  I did review basic laboratory studies on file including CBC as well as a chest x-ray.  Records reviewed    Labs: ordered. Decision-making details documented in ED Course.  Radiology: ordered  and independent interpretation performed. Decision-making details documented in ED Course.  ECG/medicine tests: ordered and independent interpretation performed. Decision-making details documented in ED Course.    Risk  Prescription drug management.    Critical Care  Total time providing critical care: 49 minutes (Authorized and performed by: Jc Hernandez MD  I personally spent a total of 49 minutes of critical care time with the patient.  Due to the high probability of clinically significant, life-threatening deterioration, the patient required my highest level of care to intervene emergently.  These interventions, including, but not limited to, establishing IV access, continuous pulse oximeter and telemetry monitoring, frequent monitoring and reevaluations, management the patient's airway and cardiovascular system, discussion with other consultants as needed, which bear directly on the management the patient.  This also includes obtaining history, examining the patient, frequent reevaluations and coordinating high level of care.  Failure to emergently initiate these interventions would carry a high probability of resulting in sudden, clinically significant or life threatening deterioration in the patient's condition.  This does not include time spent on separately reported billable procedures.)        Final diagnoses:   Acute CVA (cerebrovascular accident)       ED Disposition  ED Disposition       ED Disposition   Decision to Admit    Condition   --    Comment   Level of Care: Critical Care [6]   Admitting Physician: KELVIN BRONSON [[id number]]                 No follow-up provider specified.       Medication List      No changes were made to your prescriptions during this visit.            Jc Hernandez MD  11/28/23 6107

## 2023-11-29 NOTE — CASE MANAGEMENT/SOCIAL WORK
Discharge Planning Assessment  [facility]     Patient Name: Munira Escobar  MRN: [id number]  Today's Date: 11/29/2023    Admit Date: 11/28/2023    Plan: Inpatient Rehab   Discharge Needs Assessment       Row Name 11/29/23 1301       Living Environment    People in Home spouse;sibling(s)    Current Living Arrangements home    Primary Care Provided by self    Provides Primary Care For spouse    Family Caregiver if Needed child(tameka), adult    Quality of Family Relationships involved;supportive    Able to Return to Prior Arrangements yes       Resource/Environmental Concerns    Resource/Environmental Concerns none       Transition Planning    Patient/Family Anticipates Transition to inpatient rehabilitation facility    Patient/Family Anticipated Services at Transition ;rehabilitation services    Transportation Anticipated health plan transportation       Discharge Needs Assessment    Readmission Within the Last 30 Days no previous admission in last 30 days    Equipment Currently Used at Home none    Concerns to be Addressed discharge planning    Anticipated Changes Related to Illness inability to care for self                   Discharge Plan       Row Name 11/29/23 1309       Plan    Plan Inpatient Rehab    Plan Comments Spoke with patient's daughter by phone to initiate discharge planning.  Patient in isolation.  Patient lives with her  and sister in [facility].  Prior to admission, patient was independnet with ADL's.  She cared for her  and sister.  Daughter lives next door and assists.  Patient has no DME at home and is not current with home health.  Her PCP is Eunice Mcwilliams.  She does not have an advanced directive.  Verified that she has [employer] Medicare Replacement.  Ms. Escobar has RX coverage and has her scripts filled at [facility].  Daughter anticipates need for inpatient rehab at discharge.  Will await therapy recommendations to determine proper discharge palcement.  CM will continue to  follow.    Final Discharge Disposition Code 30 - still a patient                  Continued Care and Services - Admitted Since 11/28/2023    Coordination has not been started for this encounter.       Expected Discharge Date and Time       Expected Discharge Date Expected Discharge Time    Dec 1, 2023            Demographic Summary       Row Name 11/29/23 1300       General Information    Admission Type inpatient    Arrived From emergency department    Referral Source admission list    Reason for Consult discharge planning    Preferred Language English                   Functional Status       Row Name 11/29/23 1301       Functional Status    Usual Activity Tolerance moderate    Current Activity Tolerance poor       Functional Status, IADL    Medications independent    Meal Preparation independent    Housekeeping independent    Laundry independent    Shopping independent                   Psychosocial    No documentation.                  Abuse/Neglect    No documentation.                  Legal    No documentation.                  Substance Abuse    No documentation.                  Patient Forms    No documentation.                     Carlie Rosas RN

## 2023-11-29 NOTE — PROGRESS NOTES
INTENSIVIST   PROGRESS NOTE        SUBJECTIVE     Munira 78 y.o. 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     Interval History:  She was sleepy this morning.  But throughout the day, she is more awake.  SpO2  95% on Ambient air.    Temp  Min: 97.5 °F (36.4 °C)  Max: 100.3 °F (37.9 °C)       History     Last Reviewed by Pacheco Roberson MD on 2023 at  5:51 PM    Sections Reviewed    Medical, Surgical, Family, Tobacco, Alcohol, Drug Use, Sexual Activity,   Social Documentation    Problem list reviewed by Pacheco Roberson MD on 2023 at  5:51 PM  Medicines reviewed by Pacheco Roberson MD on 2023 at  5:51 PM  Allergies reviewed by Pacheco Roberson MD on 2023 at  5:51 PM       The patient's relevant past medical, surgical and social history were reviewed and updated in Epic as appropriate.        OBJECTIVE     Vitals:  Temp: 99 °F (37.2 °C) (23 1600) Temp  Min: 97.5 °F (36.4 °C)  Max: 100.3 °F (37.9 °C)   Temp core:      BP: 131/61 (23 1700) BP  Min: 108/73  Max: 149/70   MAP (non-invasive) Noninvasive MAP (mmHg): 87 (23 1700) Noninvasive MAP (mmHg)  Av.9  Min: 65  Max: 103   Pulse: 68 (23 1700) Pulse  Min: 41  Max: 87   Resp: 16 (23 1600) Resp  Min: 14  Max: 18   SpO2: 94 % (23 1700) SpO2  Min: 92 %  Max: 97 %   Device: room air (23 1000)    Flow Rate:   No data recorded         235 23  8984   Weight: 58.9 kg (129  lb 13.6 oz) 58.9 kg (129 lb 13.6 oz)        Intake/Ouptut 24 hrs (7:00AM - 6:59 AM)  Intake & Output (last 3 days)         11/26 0701 11/27 0700 11/27 0701 11/28 0700 11/28 0701 11/29 0700 11/29 0701 11/30 0700    Urine (mL/kg/hr)   700     Total Output   700     Net   -700                   Physical Examination  Telemetry:  Rhythm: normal sinus rhythm (11/29/23 1600)         Constitutional:  No acute distress.   Cardiovascular: RRR.    Respiratory: Normal breath sounds  No adventitious sounds   Abdominal:  Soft with no tenderness.   Extremities: No Edema   Neurological:   Awake. Some slow responses. Some confusion  Best Eye Response: 4-->(E4) spontaneous (11/29/23 1600)  Best Motor Response: 6-->(M6) obeys commands (11/29/23 1600)  Best Verbal Response: 4-->(V4) confused (11/29/23 1600)  [place] Coma Scale Score: 14 (11/29/23 1600)     NIH Stroke Scale  Interval:  (post CT) (11/29/23 0535)  1a. Level of Consciousness: 2-->Not alert, requires repeated stimulation to attend, or is obtunded and requires strong or painful stimulation to make movements (not stereotyped) (11/29/23 0700)  1b. LOC Questions: 1-->Answers one question correctly (11/29/23 0700)  1c. LOC Commands: 0-->Performs both tasks correctly (11/29/23 0700)  2. Best Gaze: 0-->Normal (11/29/23 0700)  3. Visual: 1-->Partial hemianopia (11/29/23 0700)  4. Facial Palsy: 1-->Minor paralysis (flattened nasolabial fold, asymmetry on smiling) (11/29/23 0700)  5a. Motor Arm, Left: 1-->Drift, limb holds 90 (or 45) degrees, but drifts down before full 10 seconds, does not hit bed or other support (11/29/23 0700)  5b. Motor Arm, Right: 1-->Drift, limb holds 90 (or 45) degrees, but drifts down before full 10 secs, does not hit bed or other support (11/29/23 0700)  6a. Motor Leg, Left: 1-->Drift, leg falls by the end of the 5-sec period but does not hit bed (11/29/23 0700)  6b. Motor Leg, Right: 1-->Drift, leg falls by the end of the 5-sec period but does not hit  bed (11/29/23 0700)  7. Limb Ataxia: 0-->Absent (11/29/23 0700)  8. Sensory: 1-->Mild-to-moderate sensory loss, patient feels pinprick is less sharp or is dull on the affected side, or there is a loss of superficial pain with pinprick, but patient is aware of being touched (11/29/23 0700)  9. Best Language: 1-->Mild-to-moderate aphasia, some obvious loss of fluency or facility of comprehension, without significant limitation on ideas expressed or form of expression. Reduction of speech and/or comprehension, however, makes conversation. . . (see row details) (11/29/23 0700)  10. Dysarthria: 1-->Mild-to-moderate dysarthria, patient slurs at least some words and, at worst, can be understood with some difficulty (11/29/23 0700)  11. Extinction and Inattention (formerly Neglect): 0-->No abnormality (11/29/23 0700)  Total (NIH Stroke Scale): 12 (11/29/23 0700)    Results Reviewed:  Laboratory  Microbiology  Radiology  Pathology    Hematology:  Results from last 7 days   Lab Units 11/29/23 0652 11/28/23  2103   WBC 10*3/mm3 8.01 8.15   HEMOGLOBIN g/dL 12.8 12.8   MCV fL 89.7 90.4   PLATELETS 10*3/mm3 185 212     Results from last 7 days   Lab Units 11/28/23  2103   IMM GRAN % % 0.5   NEUTROS ABS 10*3/mm3 6.57   LYMPHS ABS 10*3/mm3 0.80   MONOS ABS 10*3/mm3 0.72   EOS ABS 10*3/mm3 0.00   BASOS ABS 10*3/mm3 0.02     Chemistry:  Estimated Creatinine Clearance: 71.4 mL/min (A) (by C-G formula based on SCr of 0.53 mg/dL (L)).    Results from last 7 days   Lab Units 11/29/23  0652   SODIUM mmol/L 137   POTASSIUM mmol/L 3.9   CHLORIDE mmol/L 102   CO2 mmol/L 23.0   BUN mg/dL 12   CREATININE mg/dL 0.53*   GLUCOSE mg/dL 98     Results from last 7 days   Lab Units 11/29/23  0652   CALCIUM mg/dL 9.2   MAGNESIUM mg/dL 2.5*   PHOSPHORUS mg/dL 3.8     Hepatic Panel:  Results from last 7 days   Lab Units 11/28/23  2104   AST (SGOT) U/L 16   ALT (SGPT) U/L 13      Coagulation Labs:  Results from last 7 days   Lab Units 11/28/23  7762    APTT seconds 34.7      Cardiac Labs:  Results from last 7 days   Lab Units 11/28/23 2104   HSTROP T ng/L 10     Biomarkers:  Results from last 7 days   Lab Units 11/28/23 2104   PROCALCITONIN ng/mL 0.06     COVID-19  Lab Results   Component Value Date    COVID19 Detected (C) 11/28/2023       Images:  EEG    Result Date: 11/29/2023  Diffuse cerebral dysfunction of moderate degree, affecting the right hemisphere more so The triphasic waves noted above are abnormal but nonspecific.  These are most commonly seen due to toxic/metabolic or anoxic cause, although they may also be seen in the setting of seizure disorders This report is transcribed using the Dragon dictation system.      CT Head Without Contrast    Result Date: 11/29/2023  Impression: No acute intracranial abnormality Electronically Signed: Lobo Arteaga MD  11/29/2023 8:11 AM EST  Workstation ID: [id number]    MRI Brain Without Contrast    Result Date: 11/29/2023  Impression: 1.No evidence of hemorrhage, mass effect or midline shift. No evidence of recent or acute ischemia. 2.Moderate periventricular and subcortical FLAIR signal changes likely related to chronic microvascular ischemic change. Electronically Signed: Sarah Marin MD  11/29/2023 1:08 AM EST  Workstation ID: [id number]    CT Head Without Contrast Stroke Protocol    Result Date: 11/29/2023  Impression: No acute intracranial process identified. Electronically Signed: Sarah Marin MD  11/29/2023 12:23 AM EST  Workstation ID: [id number]    XR Chest 1 View    Result Date: 11/28/2023  Impression: Density in the left lung base which may represent a combination of atelectasis, pneumonia and/or small pleural effusion. Electronically Signed: Leopoldo Flood DO  11/28/2023 9:38 PM EST  Workstation ID: [id number]    CT Angiogram Head w AI Analysis of LVO    Result Date: 11/28/2023  1.No hemodynamically significant stenosis, large vessel cut or aneurysms in intracranial circulation 2.No hemodynamically  significant stenosis, dissection or aneurysms in extracranial circulation. Electronically Signed: Ronald Barragan MD  11/28/2023 8:24 PM CST  Workstation ID: [id number]    CT Angiogram Neck    Result Date: 11/28/2023  1.No hemodynamically significant stenosis, large vessel cut or aneurysms in intracranial circulation 2.No hemodynamically significant stenosis, dissection or aneurysms in extracranial circulation. Electronically Signed: Ronald Barragan MD  11/28/2023 8:24 PM CST  Workstation ID: [id number]    CT CEREBRAL PERFUSION WITH & WITHOUT CONTRAST    Result Date: 11/28/2023   1. No evidence of core infarct nor ischemic brain at risk Electronically Signed: Ronald Barragan MD  11/28/2023 8:20 PM CST  Workstation ID: [id number]    CT Head Without Contrast Stroke Protocol    Result Date: 11/28/2023  Impression: No acute process identified. Electronically Signed: Ronald Barragan MD  11/28/2023 8:15 PM CST  Workstation ID: [id number]     Echo:  Results for orders placed during the hospital encounter of 11/28/23    Adult Transthoracic Echo Complete W/ Cont if Necessary Per Protocol (With Agitated Saline)    Interpretation Summary    Left ventricular systolic function is normal. Left ventricular ejection fraction appears to be 61 - 65%.    Mild aortic valve regurgitation is present.    Mild tricuspid valve regurgitation is present.      Results: Reviewed.  I reviewed the patient's new laboratory and imaging results.  I independently reviewed the patient's new images.    Medications: Reviewed.    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 female admitted on 11/28/2023 with dysarthria and left facial droop, Suspected cerebrovascular accident (CVA) [R09.89], and she received Tenecteplase in the ED.    Assessment/Management/Treatment Plan:    Probably stroke. Images negative. S/p Tenecteplase    Pulmonary  SARS-CoV-2 PCR on admission. No symptoms. No known contacts.  Abnormal CXR Left basilar density (on admission)  Cardiovascular  Dyslipidemia   HTN  GI/Hepatology  GERD  CML  Endocrine   Body mass index is 25.36 kg/m². Overweight: 25.0-29.9kg/m2   Hypothyroidism  Lab Results   Component Value Date    TSH 0.608 11/28/2023      No history of Diabetes    Lab Results   Lab Value Date/Time    HGBA1C 5.00 11/29/2023 0652     Results from last 7 days   Lab Units 11/29/23  1735 11/29/23  1133   GLUCOSE mg/dL 89 87       Diet: Diet: Cardiac Diets; Healthy Heart (2-3 Na+); No Mixed Consistencies; Texture: Soft to Chew (NDD 3); Soft to Chew: Chopped Meat; Fluid Consistency: Nectar Thick  No active supplement orders      Advance Directives: Code Status and Medical Interventions:   Ordered at: 11/28/23 2222     Code Status (Patient has no pulse and is not breathing):    CPR (Attempt to Resuscitate)     Medical Interventions (Patient has pulse or is breathing):    Full Support        DVT prophylaxis:  Mechanical DVT prophylaxis orders are present.     In brief:  Discussed with family at bedside.  CT Chest tonight, when she goes for her follow-up CT-head  We discussed the use of Remdesivir, prophylactic dose, but she had episodes of bradycardia this morning, so we decided to hold on RDV.  PT/OT/SLT  F/U labs in AM  Disposition: Keep in ICU.    Plan of care and goals reviewed during interdisciplinary rounds.  I discussed the patient's findings and my recommendations with patient    MDM:    Problem(s) High due to: Acute or Chronic illness or injury that may poses a threat to life or bodily function  Data: Moderate due to: Review or results of each unique test,  Ordering of each unique test, and Assessment requiring an independent historian    Moderate    [x] Primary Attending Intensive Care Medicine - Nutrition Support   [] Consultant    Copied text in this note has been reviewed and is accurate as of 11/29/23

## 2023-11-29 NOTE — PROGRESS NOTES
Stroke Progress Note       Chief Complaint: Left-sided weakness    Subjective    Subjective     Subjective:  Patient was working with physical therapy today        Objective      Temp:  [97.5 °F (36.4 °C)-100.3 °F (37.9 °C)] 99 °F (37.2 °C)  Heart Rate:  [41-87] 68  Resp:  [14-18] 16  BP: (108-149)/(47-90) 131/61          Patient is alert, not completely oriented  Follows commands, inconsistently.  Pupils equal round reactive to light.  Corneal cough gag intact.  Antigravity strength in bilateral upper and lower extremities.  All extremities -4/5.  Mild speech difficulty.  Mixed aphasia      Results Review:    I reviewed the patient's new clinical results.    Lab Results (last 24 hours)       Procedure Component Value Units Date/Time    POC Glucose Once [[id number]]  (Normal) Collected: 11/29/23 1735    Specimen: Blood Updated: 11/29/23 1739     Glucose 89 mg/dL     POC Glucose Once [[id number]]  (Normal) Collected: 11/29/23 1133    Specimen: Blood Updated: 11/29/23 1135     Glucose 87 mg/dL     Magnesium [[id number]]  (Abnormal) Collected: 11/29/23 0652    Specimen: Blood Updated: 11/29/23 0740     Magnesium 2.5 mg/dL     Phosphorus [[id number]]  (Normal) Collected: 11/29/23 0652    Specimen: Blood Updated: 11/29/23 0740     Phosphorus 3.8 mg/dL     Basic Metabolic Panel [[id number]]  (Abnormal) Collected: 11/29/23 0652    Specimen: Blood Updated: 11/29/23 0740     Glucose 98 mg/dL      BUN 12 mg/dL      Creatinine 0.53 mg/dL      Sodium 137 mmol/L      Potassium 3.9 mmol/L      Comment: Slight hemolysis detected by analyzer. Result may be falsely elevated.        Chloride 102 mmol/L      CO2 23.0 mmol/L      Calcium 9.2 mg/dL      BUN/Creatinine Ratio 22.6     Anion Gap 12.0 mmol/L      eGFR 95.4 mL/min/1.73     Narrative:      GFR Normal >60  Chronic Kidney Disease <60  Kidney Failure <15    The GFR formula is only valid for adults with stable renal function between ages 18 and 70.    Lipid Panel [[id number]]   (Abnormal) Collected: 11/29/23 0652    Specimen: Blood Updated: 11/29/23 0740     Total Cholesterol 106 mg/dL      Triglycerides 67 mg/dL      HDL Cholesterol 64 mg/dL      LDL Cholesterol  28 mg/dL      VLDL Cholesterol 14 mg/dL      LDL/HDL Ratio 0.45    Narrative:      Cholesterol Reference Ranges  (U.S. Department of Health and Human Services ATP III Classifications)    Desirable          <200 mg/dL  Borderline High    200-239 mg/dL  High Risk          >240 mg/dL      Triglyceride Reference Ranges  (U.S. Department of Health and Human Services ATP III Classifications)    Normal           <150 mg/dL  Borderline High  150-199 mg/dL  High             200-499 mg/dL  Very High        >500 mg/dL    HDL Reference Ranges  (U.S. Department of Health and Human Services ATP III Classifications)    Low     <40 mg/dl (major risk factor for CHD)  High    >60 mg/dl ('negative' risk factor for CHD)        LDL Reference Ranges  (U.S. Department of Health and Human Services ATP III Classifications)    Optimal          <100 mg/dL  Near Optimal     100-129 mg/dL  Borderline High  130-159 mg/dL  High             160-189 mg/dL  Very High        >189 mg/dL    Hemoglobin A1c [[id number]]  (Normal) Collected: 11/29/23 0652    Specimen: Blood Updated: 11/29/23 0728     Hemoglobin A1C 5.00 %     Narrative:      Hemoglobin A1C Ranges:    Increased Risk for Diabetes  5.7% to 6.4%  Diabetes                     >= 6.5%  Diabetic Goal                < 7.0%    CBC (No Diff) [[id number]]  (Abnormal) Collected: 11/29/23 0652    Specimen: Blood Updated: 11/29/23 0727     WBC 8.01 10*3/mm3      RBC 4.58 10*6/mm3      Hemoglobin 12.8 g/dL      Hematocrit 41.1 %      MCV 89.7 fL      MCH 27.9 pg      MCHC 31.1 g/dL      RDW 15.9 %      RDW-SD 51.6 fl      MPV 10.2 fL      Platelets 185 10*3/mm3     [place] Draw [[id number]] Collected: 11/28/23 2103    Specimen: Blood Updated: 11/29/23 0115    Narrative:      The following orders were created for panel  "order [place] Draw.  Procedure                               Abnormality         Status                     ---------                               -----------         ------                     Green Top (Gel)[[id number]]                                  Final result               Lavender Top[[id number]]                                     Final result               Gold Top - SST[[id number]]                                   Final result               Gray Top[[id number]]                                         Final result               Light Blue Top[[id number]]                                   Final result                 Please view results for these tests on the individual orders.    Gray Top [[id number]] Collected: 11/28/23 2103    Specimen: Blood Updated: 11/29/23 0115     Extra Tube Hold for add-ons.     Comment: Auto resulted.       Procalcitonin [[id number]]  (Normal) Collected: 11/28/23 2104    Specimen: Blood Updated: 11/28/23 2333     Procalcitonin 0.06 ng/mL     Narrative:      As a Marker for Sepsis (Non-Neonates):    1. <0.5 ng/mL represents a low risk of severe sepsis and/or septic shock.  2. >2 ng/mL represents a high risk of severe sepsis and/or septic shock.    As a Marker for Lower Respiratory Tract Infections that require antibiotic therapy:    PCT on Admission    Antibiotic Therapy       6-12 Hrs later    >0.5                Strongly Recommended  >0.25 - <0.5        Recommended   0.1 - 0.25          Discouraged              Remeasure/reassess PCT  <0.1                Strongly Discouraged     Remeasure/reassess PCT    As 28 day mortality risk marker: \"Change in Procalcitonin Result\" (>80% or <=80%) if Day 0 (or Day 1) and Day 4 values are available. Refer to http://www.[employer]-pct-calculator.com    Change in PCT <=80%  A decrease of PCT levels below or equal to 80% defines a positive change in PCT test result representing a higher risk for 28-day all-cause mortality of patients diagnosed with " severe sepsis for septic shock.    Change in PCT >80%  A decrease of PCT levels of more than 80% defines a negative change in PCT result representing a lower risk for 28-day all-cause mortality of patients diagnosed with severe sepsis or septic shock.       COVID PRE-OP / PRE-PROCEDURE SCREENING ORDER (NO ISOLATION) - Swab, Nasopharynx [[id number]]  (Abnormal) Collected: 11/28/23 2223    Specimen: Swab from Nasopharynx Updated: 11/28/23 2333    Narrative:      The following orders were created for panel order COVID PRE-OP / PRE-PROCEDURE SCREENING ORDER (NO ISOLATION) - Swab, Nasopharynx.  Procedure                               Abnormality         Status                     ---------                               -----------         ------                     COVID-19 and FLU A/B PCR...[[id number]]  Abnormal            Final result                 Please view results for these tests on the individual orders.    COVID-19 and FLU A/B PCR, 1 HR TAT - Swab, Nasopharynx [[id number]]  (Abnormal) Collected: 11/28/23 2223    Specimen: Swab from Nasopharynx Updated: 11/28/23 2333     COVID19 Detected     Influenza A PCR Not Detected     Influenza B PCR Not Detected    Narrative:      Fact sheet for providers: https://www.fda.gov/media/779454/download    Fact sheet for patients: https://www.fda.gov/media/848796/download    Test performed by PCR.  Influenza A and Influenza B negative results should be considered presumptive in samples that have a positive SARS-CoV-2 result.    Competitive inhibition studies showed that SARS-CoV-2 virus, when present at concentrations above 3.6E+04 copies/mL, can inhibit the detection and amplification of influenza A and influenza B virus RNA if present at or below 1.8E+02 copies/mL or 4.9E+02 copies/mL, respectively, and may lead to false negative influenza virus results. If co-infection with influenza A or influenza B virus is suspected in samples with a positive SARS-CoV-2 result, the sample  should be re-tested with another FDA cleared, approved, or authorized influenza test, if influenza virus detection would change clinical management.    TSH Rfx On Abnormal To Free T4 [[id number]]  (Normal) Collected: 11/28/23 2104    Specimen: Blood Updated: 11/28/23 2332     TSH 0.608 uIU/mL     Light Blue Top [[id number]] Collected: 11/28/23 2133    Specimen: Blood Updated: 11/28/23 2246     Extra Tube Hold for add-ons.     Comment: Auto resulted       Green Top (Gel) [[id number]] Collected: 11/28/23 2104    Specimen: Blood Updated: 11/28/23 2215     Extra Tube Hold for add-ons.     Comment: Auto resulted.       Lavender Top [[id number]] Collected: 11/28/23 2103    Specimen: Blood Updated: 11/28/23 2215     Extra Tube hold for add-on     Comment: Auto resulted       Gold Top - SST [[id number]] Collected: 11/28/23 2103    Specimen: Blood Updated: 11/28/23 2215     Extra Tube Hold for add-ons.     Comment: Auto resulted.       aPTT [[id number]]  (Normal) Collected: 11/28/23 2133    Specimen: Blood Updated: 11/28/23 2156     PTT 34.7 seconds     Narrative:      PTT = The equivalent PTT values for the therapeutic range of heparin levels at 0.3 to 0.5 U/ml are 60 to 70 seconds.    Single High Sensitivity Troponin T [[id number]]  (Normal) Collected: 11/28/23 2104    Specimen: Blood Updated: 11/28/23 2153     HS Troponin T 10 ng/L     Narrative:      High Sensitive Troponin T Reference Range:  <14.0 ng/L- Negative Female for AMI  <22.0 ng/L- Negative Male for AMI  >=14 - Abnormal Female indicating possible myocardial injury.  >=22 - Abnormal Male indicating possible myocardial injury.   Clinicians would have to utilize clinical acumen, EKG, Troponin, and serial changes to determine if it is an Acute Myocardial Infarction or myocardial injury due to an underlying chronic condition.         AST [[id number]]  (Normal) Collected: 11/28/23 2104    Specimen: Blood Updated: 11/28/23 2153     AST (SGOT) 16 U/L     ALT [[id number]]   (Normal) Collected: 11/28/23 2104    Specimen: Blood Updated: 11/28/23 2153     ALT (SGPT) 13 U/L     CBC & Differential [[id number]]  (Abnormal) Collected: 11/28/23 2103    Specimen: Blood Updated: 11/28/23 2118    Narrative:      The following orders were created for panel order CBC & Differential.  Procedure                               Abnormality         Status                     ---------                               -----------         ------                     CBC Auto Differential[[id number]]        Abnormal            Final result                 Please view results for these tests on the individual orders.    CBC Auto Differential [[id number]]  (Abnormal) Collected: 11/28/23 2103    Specimen: Blood Updated: 11/28/23 2118     WBC 8.15 10*3/mm3      RBC 4.49 10*6/mm3      Hemoglobin 12.8 g/dL      Hematocrit 40.6 %      MCV 90.4 fL      MCH 28.5 pg      MCHC 31.5 g/dL      RDW 15.5 %      RDW-SD 51.5 fl      MPV 10.3 fL      Platelets 212 10*3/mm3      Neutrophil % 80.7 %      Lymphocyte % 9.8 %      Monocyte % 8.8 %      Eosinophil % 0.0 %      Basophil % 0.2 %      Immature Grans % 0.5 %      Neutrophils, Absolute 6.57 10*3/mm3      Lymphocytes, Absolute 0.80 10*3/mm3      Monocytes, Absolute 0.72 10*3/mm3      Eosinophils, Absolute 0.00 10*3/mm3      Basophils, Absolute 0.02 10*3/mm3      Immature Grans, Absolute 0.04 10*3/mm3      nRBC 0.0 /100 WBC           EEG    Result Date: 11/29/2023  Diffuse cerebral dysfunction of moderate degree, affecting the right hemisphere more so The triphasic waves noted above are abnormal but nonspecific.  These are most commonly seen due to toxic/metabolic or anoxic cause, although they may also be seen in the setting of seizure disorders This report is transcribed using the Dragon dictation system.      CT Head Without Contrast    Result Date: 11/29/2023  Impression: No acute intracranial abnormality Electronically Signed: Lobo Arteaga MD  11/29/2023 8:11 AM EST   Workstation ID: [id number]    MRI Brain Without Contrast    Result Date: 11/29/2023  Impression: 1.No evidence of hemorrhage, mass effect or midline shift. No evidence of recent or acute ischemia. 2.Moderate periventricular and subcortical FLAIR signal changes likely related to chronic microvascular ischemic change. Electronically Signed: Sarah Marin MD  11/29/2023 1:08 AM EST  Workstation ID: [id number]    CT Head Without Contrast Stroke Protocol    Result Date: 11/29/2023  Impression: No acute intracranial process identified. Electronically Signed: Sarah Marin MD  11/29/2023 12:23 AM EST  Workstation ID: [id number]    XR Chest 1 View    Result Date: 11/28/2023  Impression: Density in the left lung base which may represent a combination of atelectasis, pneumonia and/or small pleural effusion. Electronically Signed: Leopoldo Flood DO  11/28/2023 9:38 PM EST  Workstation ID: [id number]    CT Angiogram Head w AI Analysis of LVO    Result Date: 11/28/2023  1.No hemodynamically significant stenosis, large vessel cut or aneurysms in intracranial circulation 2.No hemodynamically significant stenosis, dissection or aneurysms in extracranial circulation. Electronically Signed: Ronald Barragan MD  11/28/2023 8:24 PM CST  Workstation ID: [id number]    CT Angiogram Neck    Result Date: 11/28/2023  1.No hemodynamically significant stenosis, large vessel cut or aneurysms in intracranial circulation 2.No hemodynamically significant stenosis, dissection or aneurysms in extracranial circulation. Electronically Signed: Ronald Barragan MD  11/28/2023 8:24 PM CST  Workstation ID: [id number]    CT CEREBRAL PERFUSION WITH & WITHOUT CONTRAST    Result Date: 11/28/2023   1. No evidence of core infarct nor ischemic brain at risk Electronically Signed: Ronald Barragan MD  11/28/2023 8:20 PM CST  Workstation ID: [id number]    CT Head Without Contrast Stroke Protocol    Result Date: 11/28/2023  Impression: No acute process identified. Electronically Signed:  Ronald Barragan MD  11/28/2023 8:15 PM CST  Workstation ID: [id number]   Results for orders placed during the hospital encounter of 11/28/23    Adult Transthoracic Echo Complete W/ Cont if Necessary Per Protocol (With Agitated Saline)    Interpretation Summary    Left ventricular systolic function is normal. Left ventricular ejection fraction appears to be 61 - 65%.    Mild aortic valve regurgitation is present.    Mild tricuspid valve regurgitation is present.            Assessment/Plan     Assessment/Plan:    80-year-old female with history of hypertension, dyslipidemia who presented with left facial droop and speech difficulty.  Acute stroke imaging included CT head, CTA head and neck and CT perfusion which did not reveal any acute abnormality.    Patient was a poor historian,  there was a delay in TNK administration, due to unavailability of accurate history regarding the timeline of onset of symptoms, and attempts to reach the family and POA to get adequate and accurate information.       On my exam today, patient is alert, with speech difficulty, but no other focal weakness.  MRI brain did not reveal any acute infarct.    Acute cerebrovascular syndrome, status post IV tPA  -Patient is recovering okay.  -Okay to continue aspirin 81 after 24 hours stable CT scan  -stroke work-up pending.    Hypertension-normal blood pressure goals      SARS-CoV 2 PCR positive on admission, without any symptoms.    I, spent more than 35 minutes reviewing scans, discussing the plan with multidisciplinary team.    Stroke we will continue to follow the patient.  Likely transition to the floor tomorrow.          Maynor Akers MD  11/29/23  18:09 EST

## 2023-11-30 LAB
ANION GAP SERPL CALCULATED.3IONS-SCNC: 14 MMOL/L (ref 5–15)
BACTERIA SPEC AEROBE CULT: NO GROWTH
BASOPHILS # BLD AUTO: 0.03 10*3/MM3 (ref 0–0.2)
BASOPHILS NFR BLD AUTO: 0.4 % (ref 0–1.5)
BUN SERPL-MCNC: 15 MG/DL (ref 8–23)
BUN/CREAT SERPL: 25.4 (ref 7–25)
CALCIUM SPEC-SCNC: 8.8 MG/DL (ref 8.6–10.5)
CHLORIDE SERPL-SCNC: 103 MMOL/L (ref 98–107)
CO2 SERPL-SCNC: 24 MMOL/L (ref 22–29)
CREAT SERPL-MCNC: 0.59 MG/DL (ref 0.57–1)
CRP SERPL-MCNC: 3.37 MG/DL (ref 0–0.5)
DEPRECATED RDW RBC AUTO: 52 FL (ref 37–54)
EGFRCR SERPLBLD CKD-EPI 2021: 93 ML/MIN/1.73
EOSINOPHIL # BLD AUTO: 0.01 10*3/MM3 (ref 0–0.4)
EOSINOPHIL NFR BLD AUTO: 0.1 % (ref 0.3–6.2)
ERYTHROCYTE [DISTWIDTH] IN BLOOD BY AUTOMATED COUNT: 15.9 % (ref 12.3–15.4)
FERRITIN SERPL-MCNC: 95.74 NG/ML (ref 13–150)
GLUCOSE BLDC GLUCOMTR-MCNC: 106 MG/DL (ref 70–130)
GLUCOSE SERPL-MCNC: 90 MG/DL (ref 65–99)
HCT VFR BLD AUTO: 37.5 % (ref 34–46.6)
HGB BLD-MCNC: 11.9 G/DL (ref 12–15.9)
IMM GRANULOCYTES # BLD AUTO: 0.02 10*3/MM3 (ref 0–0.05)
IMM GRANULOCYTES NFR BLD AUTO: 0.3 % (ref 0–0.5)
LYMPHOCYTES # BLD AUTO: 1.94 10*3/MM3 (ref 0.7–3.1)
LYMPHOCYTES NFR BLD AUTO: 26.3 % (ref 19.6–45.3)
MAGNESIUM SERPL-MCNC: 2.3 MG/DL (ref 1.6–2.4)
MCH RBC QN AUTO: 28.3 PG (ref 26.6–33)
MCHC RBC AUTO-ENTMCNC: 31.7 G/DL (ref 31.5–35.7)
MCV RBC AUTO: 89.3 FL (ref 79–97)
MONOCYTES # BLD AUTO: 0.79 10*3/MM3 (ref 0.1–0.9)
MONOCYTES NFR BLD AUTO: 10.7 % (ref 5–12)
NEUTROPHILS NFR BLD AUTO: 4.58 10*3/MM3 (ref 1.7–7)
NEUTROPHILS NFR BLD AUTO: 62.2 % (ref 42.7–76)
NRBC BLD AUTO-RTO: 0 /100 WBC (ref 0–0.2)
PHOSPHATE SERPL-MCNC: 3.1 MG/DL (ref 2.5–4.5)
PLATELET # BLD AUTO: 200 10*3/MM3 (ref 140–450)
PMV BLD AUTO: 10.7 FL (ref 6–12)
POTASSIUM SERPL-SCNC: 3.4 MMOL/L (ref 3.5–5.2)
PROCALCITONIN SERPL-MCNC: 0.04 NG/ML (ref 0–0.25)
RBC # BLD AUTO: 4.2 10*6/MM3 (ref 3.77–5.28)
SODIUM SERPL-SCNC: 141 MMOL/L (ref 136–145)
WBC NRBC COR # BLD AUTO: 7.37 10*3/MM3 (ref 3.4–10.8)

## 2023-11-30 PROCEDURE — 86140 C-REACTIVE PROTEIN: CPT | Performed by: INTERNAL MEDICINE

## 2023-11-30 PROCEDURE — 82728 ASSAY OF FERRITIN: CPT | Performed by: INTERNAL MEDICINE

## 2023-11-30 PROCEDURE — 83735 ASSAY OF MAGNESIUM: CPT | Performed by: INTERNAL MEDICINE

## 2023-11-30 PROCEDURE — 85025 COMPLETE CBC W/AUTO DIFF WBC: CPT | Performed by: INTERNAL MEDICINE

## 2023-11-30 PROCEDURE — 92523 SPEECH SOUND LANG COMPREHEN: CPT

## 2023-11-30 PROCEDURE — 92610 EVALUATE SWALLOWING FUNCTION: CPT

## 2023-11-30 PROCEDURE — 84145 PROCALCITONIN (PCT): CPT | Performed by: INTERNAL MEDICINE

## 2023-11-30 PROCEDURE — 80048 BASIC METABOLIC PNL TOTAL CA: CPT | Performed by: INTERNAL MEDICINE

## 2023-11-30 PROCEDURE — 25810000003 SODIUM CHLORIDE 0.9 % SOLUTION: Performed by: NURSE PRACTITIONER

## 2023-11-30 PROCEDURE — 99232 SBSQ HOSP IP/OBS MODERATE 35: CPT | Performed by: INTERNAL MEDICINE

## 2023-11-30 PROCEDURE — 84100 ASSAY OF PHOSPHORUS: CPT | Performed by: INTERNAL MEDICINE

## 2023-11-30 PROCEDURE — 82948 REAGENT STRIP/BLOOD GLUCOSE: CPT

## 2023-11-30 RX ORDER — BISACODYL 5 MG/1
5 TABLET, DELAYED RELEASE ORAL DAILY PRN
Status: DISCONTINUED | OUTPATIENT
Start: 2023-11-30 | End: 2023-12-01

## 2023-11-30 RX ORDER — ACETAMINOPHEN 650 MG/1
650 SUPPOSITORY RECTAL EVERY 4 HOURS PRN
Status: DISCONTINUED | OUTPATIENT
Start: 2023-11-30 | End: 2023-11-30

## 2023-11-30 RX ORDER — POTASSIUM CHLORIDE 20 MEQ/1
40 TABLET, EXTENDED RELEASE ORAL ONCE
Status: COMPLETED | OUTPATIENT
Start: 2023-11-30 | End: 2023-11-30

## 2023-11-30 RX ORDER — POLYETHYLENE GLYCOL 3350 17 G/17G
17 POWDER, FOR SOLUTION ORAL DAILY PRN
Status: DISCONTINUED | OUTPATIENT
Start: 2023-11-30 | End: 2023-12-01

## 2023-11-30 RX ORDER — ACETAMINOPHEN 325 MG/1
650 TABLET ORAL EVERY 4 HOURS PRN
Status: DISCONTINUED | OUTPATIENT
Start: 2023-11-30 | End: 2023-12-03 | Stop reason: HOSPADM

## 2023-11-30 RX ORDER — ASPIRIN 81 MG/1
81 TABLET, CHEWABLE ORAL DAILY
Status: DISCONTINUED | OUTPATIENT
Start: 2023-11-30 | End: 2023-11-30

## 2023-11-30 RX ORDER — BISACODYL 10 MG
10 SUPPOSITORY, RECTAL RECTAL DAILY PRN
Status: DISCONTINUED | OUTPATIENT
Start: 2023-11-30 | End: 2023-12-01

## 2023-11-30 RX ORDER — AMOXICILLIN 250 MG
2 CAPSULE ORAL 2 TIMES DAILY
Status: DISCONTINUED | OUTPATIENT
Start: 2023-11-30 | End: 2023-12-03 | Stop reason: HOSPADM

## 2023-11-30 RX ORDER — ASPIRIN 81 MG/1
324 TABLET, CHEWABLE ORAL DAILY
Status: DISCONTINUED | OUTPATIENT
Start: 2023-12-01 | End: 2023-12-03 | Stop reason: HOSPADM

## 2023-11-30 RX ADMIN — SODIUM CHLORIDE 500 ML: 9 INJECTION, SOLUTION INTRAVENOUS at 11:37

## 2023-11-30 RX ADMIN — ACETAMINOPHEN 650 MG: 325 TABLET ORAL at 11:41

## 2023-11-30 RX ADMIN — ATORVASTATIN CALCIUM 80 MG: 40 TABLET, FILM COATED ORAL at 21:38

## 2023-11-30 RX ADMIN — SENNOSIDES AND DOCUSATE SODIUM 2 TABLET: 8.6; 5 TABLET ORAL at 21:38

## 2023-11-30 RX ADMIN — POTASSIUM CHLORIDE 40 MEQ: 1500 TABLET, EXTENDED RELEASE ORAL at 11:37

## 2023-11-30 RX ADMIN — ASPIRIN 81 MG CHEWABLE TABLET 81 MG: 81 TABLET CHEWABLE at 11:37

## 2023-11-30 RX ADMIN — ASPIRIN 325 MG: 325 TABLET ORAL at 02:45

## 2023-11-30 NOTE — PLAN OF CARE
Goal Outcome Evaluation:   SLP re-evaluation completed. Will continue to address dysphagia and communication. Please see note for further details and recommendations.                    Anticipated Discharge Disposition (SLP): inpatient rehabilitation facility

## 2023-11-30 NOTE — PROGRESS NOTES
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    PSH:  She  has a past surgical history that includes Hysterectomy; Appendectomy; and Cardiac catheterization (03/31/2020).    Applicable Nutrition Concerns:   Skin:  Oral:  GI:    Applicable Interval History:       Reported/Observed/Food/Nutrition Related History:     Pt presented with suspected CVA and also incidentally COVID+, asymptomatic. Pt alert but not oriented with mixed aphasia. Did not answer room phone or intercom. RD unable to reach any of the contacts listed in EPIC, no answer, will continue to try. Per nursing screen on POA dtr did report pt with weight loss and reduced oral intakes. Per EMR pt has had weight loss. Per RN pt did not eat yesterday, passed SLP eval and stated she would try to eat today. NKFA listed in epic.     Anthropometrics     Flowsheet Rows      Flowsheet Row First Filed Value   Admission Height 152.4 cm (60\") Documented at 11/28/2023 2142   Admission Weight 57.4 kg (126 lb 8.7 oz) Documented at 11/28/2023 2100            Height: Height: 152.4 cm (60\")  Last Filed Weight: Weight: 58.9 kg (129 lb 13.6 oz) (11/29/23 1654)  Method: Weight Method: Bed scale  BMI: BMI (Calculated): 25.4  BMI " classification: Overweight: 25.0-29.9kg/m2   IBW:   100 lb    UBW:     Weight       Weight (kg) Weight (lbs) Weight Method Visit Report   4/19/2022 68.493 kg  151 lb   --    10/26/2022 65.772 kg  145 lb   --    6/7/2023 62.234 kg  137 lb 3.2 oz   --    9/15/2023  135 lb MDOV    11/28/2023 57.4 kg  126 lb 8.7 oz  Bed scale      53 kg  116 lb 13.5 oz       58.9 kg  129 lb 13.6 oz      11/29/2023 58.9 kg  129 lb 13.6 oz        Weight change: weight loss of 9 lbs (6.7%) over the past 3 month(s)    Intentional?  No    Nutrition Focused Physical Exam     Date:  11/30       Unable to perform due to COVID Isolation      Current Nutrition Prescription   PO: Diet: Cardiac Diets; Healthy Heart (2-3 Na+); No Mixed Consistencies; Texture: Soft to Chew (NDD 3); Soft to Chew: Chopped Meat; Fluid Consistency: [place] Thick  Oral Nutrition Supplement:   Intake: Insufficient data    Nutrition Diagnosis   Date:  11/30            Updated:    Problem Predicted suboptimal energy intake    Etiology Suspected CVA, COVID   Signs/Symptoms Dysphagia diet, reported poor intakes PTA   Status: New    Goal:   General: Nutrition to support treatment  PO: Increase intake  EN/PN: N/A    Nutrition Intervention      Follow treatment progress, Care plan reviewed, Advise alternate selection, Advised available snacks, Interview for preferences, Encourage intake, Supplement provided    Magic cup 2x/day  Will complete full assessment as feasible (as unable to reach pt/family currently)    Monitoring/Evaluation:   Per protocol, I&O, PO intake, Supplement intake, Pertinent labs, Weight, Symptoms, POC/GOC      Gina Davis RD  Time Spent: 20m

## 2023-11-30 NOTE — PROGRESS NOTES
Stroke Progress Note       Chief Complaint: Left-sided weakness, left facial droop    Subjective    Subjective     Subjective:  Patient was seen and examined in her room on morning rounds.  Primary RN at the bedside, no acute events were reported.  Patient complains of intermittent headache and points to her head frontal, oriented only to herself, follow some simple one-step commands.    Review of Systems   Unable to perform ROS: Acuity of condition (Patient is poor historian, only oriented to herself)   Constitutional:  Negative for fever.            Objective    Objective      Temp:  [98.5 °F (36.9 °C)-99.5 °F (37.5 °C)] 98.5 °F (36.9 °C)  Heart Rate:  [41-86] 55  Resp:  [16-18] 18  BP: (106-138)/(47-90) 116/60        Neurological Exam  Mental Status  Awake and alert. Oriented only to person. Mild dysarthria present. Expressive aphasia and receptive aphasia present.    Cranial Nerves  CN II: Right visual acuity: Normal. Left visual acuity: Normal. Right normal visual field. Left normal visual field.  CN III, IV, VI: Extraocular movements intact bilaterally. Normal lids and orbits bilaterally. Pupils equal round and reactive to light bilaterally.  CN V:  Right: Facial sensation is normal. Normal corneal reflex.  Left: Facial sensation is normal on the left. Normal corneal reflex. Reported patient numbness.  CN VII:  Left: There is central facial weakness.  CN VIII: Intact hearing bilateral.  CN IX, X: Unable to assess patient did not follow commands.  CN XI:  Right: Sternocleidomastoid strength is weak.  Left: Sternocleidomastoid strength is normal.  CN XII: Tongue midline without atrophy or fasciculations.    Motor  Decreased muscle bulk throughout. No fasciculations present. Normal muscle tone. No abnormal involuntary movements.  Generalized weakness, strength is equal bilaterally upper and lower extremity, no drift.    Sensory  Light touch is normal in upper and lower extremities.  No right-sided hemispatial  neglect. No left-sided hemispatial neglect.    Reflexes  Right Plantar: downgoing  Left Plantar: downgoing    Coordination    Unable to assess.    Gait    Unable to assess.      Physical Exam  Constitutional:       General: She is awake.      Comments: Patient is alert to herself and age disoriented to time situation and place   HENT:      Head: Normocephalic and atraumatic.      Mouth/Throat:      Mouth: Mucous membranes are moist.   Eyes:      General: Lids are normal.      Extraocular Movements: Extraocular movements intact.      Pupils: Pupils are equal, round, and reactive to light.   Cardiovascular:      Rate and Rhythm: Normal rate and regular rhythm.   Pulmonary:      Effort: Pulmonary effort is normal.      Comments: Breathing comfortable on room air  Musculoskeletal:         General: Normal range of motion.      Cervical back: Normal range of motion and neck supple.   Skin:     General: Skin is warm and dry.   Neurological:      Mental Status: She is alert.      Cranial Nerves: Cranial nerve deficit and dysarthria present.      Motor: Weakness present.   Psychiatric:      Comments: Unable to assess       Interval: baseline  1a. Level of Consciousness: 0-->Alert, keenly responsive  1b. LOC Questions: 1-->Answers one question correctly  1c. LOC Commands: 0-->Performs both tasks correctly  2. Best Gaze: 0-->Normal  3. Visual: 0-->No visual loss  4. Facial Palsy: 1-->Minor paralysis (flattened nasolabial fold, asymmetry on smiling)  5a. Motor Arm, Left: 0-->No drift, limb holds 90 (or 45) degrees for full 10 secs  5b. Motor Arm, Right: 0-->No drift, limb holds 90 (or 45) degrees for full 10 secs  6a. Motor Leg, Left: 0-->No drift, leg holds 30 degree position for full 5 secs  6b. Motor Leg, Right: 0-->No drift, leg holds 30 degree position for full 5 secs  7. Limb Ataxia: 0-->Absent  8. Sensory: 0-->Normal, no sensory loss  9. Best Language: 1-->Mild-to-moderate aphasia, some obvious loss of fluency or  facility of comprehension, without significant limitation on ideas expressed or form of expression. Reduction of speech and/or comprehension, however, makes conversation. . . (see row details)  10. Dysarthria: 1-->Mild-to-moderate dysarthria, patient slurs at least some words and, at worst, can be understood with some difficulty  11. Extinction and Inattention (formerly Neglect): 0-->No abnormality    Total (NIH Stroke Scale): 4    Results Review:    I reviewed the patient's new clinical results.  Labs reviewed and remarkable for  Potassium 3.4  Creatinine 0.59  BUN 15  WBC 7.37  H&H 11.9\37.5  Platelet 200  AST 16  ALT 13  UA negative  LDL 28, HDL 64  Results for orders placed during the hospital encounter of 11/28/23    Adult Transthoracic Echo Complete W/ Cont if Necessary Per Protocol (With Agitated Saline)    Interpretation Summary    Left ventricular systolic function is normal. Left ventricular ejection fraction appears to be 61 - 65%.    Mild aortic valve regurgitation is present.    Mild tricuspid valve regurgitation is present.    CT Chest Without Contrast Diagnostic    Result Date: 11/30/2023  Impression: 1.Mild patchy airspace changes present within the lung bases bilaterally, likely related to atelectasis. No significant air bronchograms identified. No dominant consolidation.. 2.Ancillary findings as described above. Electronically Signed: Sarah Marin MD  11/30/2023 2:31 AM EST  Workstation ID: [id number]    CT Head Without Contrast    Result Date: 11/30/2023  Impression: No acute intracranial process identified. Electronically Signed: Sarah Marin MD  11/30/2023 2:30 AM EST  Workstation ID: [id number]    EEG    Result Date: 11/29/2023  Diffuse cerebral dysfunction of moderate degree, affecting the right hemisphere more so The triphasic waves noted above are abnormal but nonspecific.  These are most commonly seen due to toxic/metabolic or anoxic cause, although they may also be seen in the setting of  seizure disorders This report is transcribed using the Dragon dictation system.      CT Head Without Contrast    Result Date: 11/29/2023  Impression: No acute intracranial abnormality Electronically Signed: Lobo Arteaga MD  11/29/2023 8:11 AM EST  Workstation ID: [id number]    MRI Brain Without Contrast    Result Date: 11/29/2023  Impression: 1.No evidence of hemorrhage, mass effect or midline shift. No evidence of recent or acute ischemia. 2.Moderate periventricular and subcortical FLAIR signal changes likely related to chronic microvascular ischemic change. Electronically Signed: Sarah Marin MD  11/29/2023 1:08 AM EST  Workstation ID: [id number]    CT Head Without Contrast Stroke Protocol    Result Date: 11/29/2023  Impression: No acute intracranial process identified. Electronically Signed: Sarah Marin MD  11/29/2023 12:23 AM EST  Workstation ID: [id number]    XR Chest 1 View    Result Date: 11/28/2023  Impression: Density in the left lung base which may represent a combination of atelectasis, pneumonia and/or small pleural effusion. Electronically Signed: Leopoldo Flood DO  11/28/2023 9:38 PM EST  Workstation ID: [id number]    CT Angiogram Head w AI Analysis of LVO    Result Date: 11/28/2023  1.No hemodynamically significant stenosis, large vessel cut or aneurysms in intracranial circulation 2.No hemodynamically significant stenosis, dissection or aneurysms in extracranial circulation. Electronically Signed: Ronald Barragan MD  11/28/2023 8:24 PM CST  Workstation ID: [id number]    CT Angiogram Neck    Result Date: 11/28/2023  1.No hemodynamically significant stenosis, large vessel cut or aneurysms in intracranial circulation 2.No hemodynamically significant stenosis, dissection or aneurysms in extracranial circulation. Electronically Signed: Ronald Barragan MD  11/28/2023 8:24 PM CST  Workstation ID: [id number]    CT CEREBRAL PERFUSION WITH & WITHOUT CONTRAST    Result Date: 11/28/2023   1. No evidence of core infarct nor  ischemic brain at risk Electronically Signed: Ronald Barragan MD  11/28/2023 8:20 PM CST  Workstation ID: [id number]    CT Head Without Contrast Stroke Protocol    Result Date: 11/28/2023  Impression: No acute process identified. Electronically Signed: Ronald Barragan MD  11/28/2023 8:15 PM CST  Workstation ID: [id number]                   Assessment/Plan     Assessment/Plan:    This is a 80-year-old white female, with significant health diagnosis for hypertension, hyperlipidemia who presented to [facility] ED for left facial droop, dysarthria.  Imaging of CT head, CTA, CTP revealed no acute abnormality.  Patient is status post IV thrombolytic therapy 11/29/2023 at 2200.  Neurology stroke continues to follow-up.      Antiplatelet PTA: Aspirin 81 mg  Anticoagulant PTA: None      Acute CVA status post IV thrombolytic therapy TNK  -Status post TNK 11/29/2023 at 2200  -Stable CT 24 hours, no hemorrhage, MRI negative for acute abnormality.  -Continue full dose aspirin 325 mg p.o.  daily indefinite for stroke prevention.  -Continue NIH\neurochecks per protocol  -Systolic blood pressure goal normotensive 130/80  -Continue PT\OT\SLP we appreciate the recommendation  -Patient will need Holter\cardiac monitor on discharge  -Continue high intensity statin Lipitor 80 mg p.o. daily at night for secondary stroke prevention.  -If patient is medically ready, from stroke standpoint patient is stable to be transferred to telemetry floor.  -Patient needs to follow-up with neurology stroke upon discharge 1 to 3 months, please make the appointment.  -Neurology stroke we will continue to follow-up      Essential hypertension  -Currently blood pressure normotensive    Hyperlipidemia  -Continue high intensity statin as above    Intermittent headache  -CTA head this a.m. negative for hemorrhage or acute abnormality, MRI negative for acute abnormality  -Tylenol 650 as needed for mild pain p.o. or rectal  -IV fluid hydration one-time 500 cc normal saline  bolus  -If headache does not improve can repeat CT head at 1600 rule out any hemorrhage  -Okay to give migraine cocktail if headache symptoms persists with negative CT head.     Asymptomatic COVID infection  -Patient breathing comfortable on room air satting above 97%  -Management by intensivist    Hypothyroidism  -Management by medicine    Discussed plan of care with nurse patient, primary RN, , neurology stroke attending.  Neurology stroke we will continue to follow-up.  Thank you for letting us participate in patient care.          Jacqueline Alvarado, PENNY  11/30/23  08:30 EST

## 2023-11-30 NOTE — THERAPY RE-EVALUATION
Acute Care - Speech Language Pathology Initial Evaluation  [facility]  Clinical Swallow Evaluation  Cognitive-Communication Evaluation       Patient Name: Munira Escobar  : 1945  MRN: [id number]  Today's Date: 2023               Admit Date: 2023     Visit Dx:    ICD-10-CM ICD-9-CM   1. Acute CVA (cerebrovascular accident)  I63.9 434.91   2. Dysphagia, unspecified type  R13.10 787.20   3. 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 Go)     HYSTERECTOMY         SLP Recommendation and Plan  SLP Diagnosis: mild-moderate, cognitive-linguistic disorder, functional speech/language skills (23)  SLP Diagnosis Comments: Expressive/receptive language abilties are grossly functional @ simple level per this eval. Pt presents w/ mild to moderate cognitive linguistic deficits. SLP will f/u for tx & dx tx as appropriate (23)        Swallow Criteria for Skilled Therapeutic Interventions Met: demonstrates skilled criteria (23)  SLC Criteria for Skilled Therapy Interventions Met: yes (23)  Anticipated  Discharge Disposition (SLP): inpatient rehabilitation facility (11/30/23 1625)        Therapy Frequency (SLP SLC): 3 days per week (11/30/23 1625)  Predicted Duration Therapy Intervention (Days): until discharge (11/30/23 1625)  Oral Care Recommendations: Oral Care BID/PRN, Toothbrush (11/30/23 1625)                                 SLP EVALUATION (last 72 hours)       SLP SLC Evaluation       Row Name 11/30/23 1625                   General Information    Prior Level of Function-Communication unknown  -        Patient's Goals for Discharge patient did not state  -        Family Goals for Discharge family did not state  -           Comprehension Assessment/Intervention    Comprehension Assessment/Intervention Auditory Comprehension  -           Auditory Comprehension Assessment/Intervention    Able to Identify Objects/Pictures (Communication) body part;familiar objects;pictures of common objects;[facility]        Answers Questions (Communication) yes/no; questions;simple;[facility]        Able to Follow Commands (Communication) 1-step;2-step;[facility]           Expression Assessment/Intervention    Expression Assessment/Intervention verbal expression  -           Verbal Expression Assessment/Intervention    Automatic Speech (Communication) response to greeting;days of week;months of year;[facility]        Repetition phrases;[facility]        Phrase Completion automatic/predictable;[facility]  -        Responsive Naming simple;[facility]        Confrontational Naming high frequency;[facility]        Spontaneous/Functional Words simple;[facility]        Sentence Formulation simple;[facility]           Motor Speech Assessment/Intervention    Motor Speech Function [facility]           Cognitive Assessment Intervention- SLP    Cognitive Function (Cognition) mild impairment  [facility]        Orientation Status (Cognition) awareness of basic personal information;person;place;time;situation;[facility]        Memory (Cognitive)  simple;immediate;mild impairment;moderate impairment  -        Attention (Cognitive) selective;sustained;mild impairment  -        Thought Organization (Cognitive) concrete divergent;concrete convergent;mild impairment;moderate impairment  -        Reasoning (Cognitive) simple;moderate impairment  -        Problem Solving (Cognitive) simple;temporal;moderate impairment  -           SLP Evaluation Clinical Impressions    SLP Diagnosis mild-moderate;cognitive-linguistic disorder;functional speech/language skills  -        SLP Diagnosis Comments Expressive/receptive language abilties are grossly functional @ simple level per this eval. Pt presents w/ mild to moderate cognitive linguistic deficits. SLP will f/u for tx & dx tx as appropriate  -        Rehab Potential/Prognosis good  -        SLC Criteria for Skilled Therapy Interventions Met yes  -        Functional Impact difficulty in expressing complex messages  -           Recommendations    Therapy Frequency (SLP SLC) 3 days per week  -                  User Key  (r) = Recorded By, (t) = Taken By, (c) = Cosigned By      Initials Name Effective Dates     Emma Elise, MS CCC-SLP 05/12/23 -                        EDUCATION  The patient has been educated in the following areas:     Cognitive Impairment Communication Impairment.           SLP GOALS       Row Name 11/30/23 2846             Patient will demonstrate functional cognitive-linguistic skills for return to discharge environment    [place] Independently  -      Time frame by discharge  -      Progress/Outcomes new goal  -         SLP Diagnostic Treatment     Patient will participate in further assessment in the following areas reading comprehension;graphic expression  -      Time Frame (Diagnostic) short term goal (STG)  -      Progress/Outcomes (Additional Goal 1, SLP) new goal  -         Attention Goal 1 (SLP)    Improve Attention by Goal 1 (SLP) complete selective  attention task;complete sustained attention task;80%;with minimal cues (75-90%)  -      Time Frame (Attention Goal 1, SLP) short term goal (STG)  -MH      Progress/Outcomes (Attention Goal 1, SLP) new goal  -MH         Memory Skills Goal 1 (SLP)    Improve Memory Skills Through Goal 1 (SLP) recalling related word lists immediately;recalling unrelated word lists immediately;80%;with minimal cues (75-90%)  -      Time Frame (Memory Skills Goal 1, SLP) short term goal (STG)  -MH      Progress/Outcomes (Memory Skills Goal 1, SLP) new goal  -MH         Organizational Skills Goal 1 (SLP)    Improve Thought Organization Through Goal 1 (SLP) completing a divergent naming task;completing a convergent naming task;80%;with minimal cues (75-90%)  -      Time Frame (Thought Organization Skills Goal 1, SLP) short term goal (STG)  -MH      Progress/Outcomes (Thought Organization Skills Goal 1, SLP) new goal  -MH                User Key  (r) = Recorded By, (t) = Taken By, (c) = Cosigned By      Initials Name Provider Type    Emma Fishman MS CCC-SLP Speech and Language Pathologist                            Time Calculation:      Time Calculation- SLP       Row Name 11/30/23 1721             Time Calculation- SLP    SLP Start Time 1625  -      SLP Received On 11/30/23  -         Untimed Charges    64610-EJ Eval Speech and Production w/ Language Minutes 36  -MH      62350-JD Eval Oral Pharyng Swallow Minutes 36  -MH         Total Minutes    Untimed Charges Total Minutes 72  -MH       Total Minutes 72  -MH                User Key  (r) = Recorded By, (t) = Taken By, (c) = Cosigned By      Initials Name Provider Type    Emma Fishman MS CCC-SLP Speech and Language Pathologist                    Therapy Charges for Today       Code Description Service Date Service Provider Modifiers Qty    35602878944 HC ST EVAL ORAL PHARYNG SWALLOW 2 11/30/2023 Emma Elise MS CCC-ARSLAN GN 1    68226522484 HC ST EVAL  SPEECH AND PROD W LANG  2 2023 Emma Elise, MS CCC-SLP GN 1                       Emma Elise MS CCC-SLP  2023   and Acute Care - Speech Language Pathology   Swallow Re-Evaluation  [place]     Patient Name: Munira Escobar  : 1945  MRN: [id number]  Today's Date: 2023               Admit Date: 2023    Visit Dx:     ICD-10-CM ICD-9-CM   1. Acute CVA (cerebrovascular accident)  I63.9 434.91   2. Dysphagia, unspecified type  R13.10 787.20   3. 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 Go)     HYSTERECTOMY         SLP Recommendation and Plan  SLP Swallowing Diagnosis: R/O pharyngeal dysphagia (23)  SLP Diet Recommendation: soft to chew textures, chopped, thin liquids (23)  Recommended Precautions and Strategies: upright posture during/after eating, general aspiration precautions, small bites of food and sips of liquid, other (see comments) (small/single sips) (23)  SLP Rec. for Method of Medication Administration: meds whole, meds crushed, with thick liquids, with puree,  as tolerated (11/30/23 1625)     Monitor for Signs of Aspiration: notify SLP if any concerns (11/30/23 1625)  Recommended Diagnostics: reassess via clinical swallow evaluation (11/30/23 1625)  Swallow Criteria for Skilled Therapeutic Interventions Met: demonstrates skilled criteria (11/30/23 1625)  Anticipated Discharge Disposition (SLP): inpatient rehabilitation facility (11/30/23 1625)  Rehab Potential/Prognosis, Swallowing: good, to achieve stated therapy goals (11/30/23 1625)     Predicted Duration Therapy Intervention (Days): until discharge (11/30/23 1625)  Oral Care Recommendations: Oral Care BID/PRN, Toothbrush (11/30/23 1625)                                      Oral Care Recommendations: Oral Care BID/PRN, Toothbrush (11/30/23 1625)           SWALLOW EVALUATION (last 72 hours)       SLP Adult Swallow Evaluation       Row Name 11/30/23 1625 11/29/23 0915                Rehab Evaluation    Document Type re-evaluation  - evaluation  -       Subjective Information no complaints  - no complaints  -       Patient Observations alert;cooperative  - alert  fairly cooperative  -       Patient/Family/Caregiver Comments/Observations Family present  - Dtr present  -       Patient Effort good  - adequate  -       Symptoms Noted During/After Treatment none  - --          General Information    Patient Profile Reviewed yes  - yes  -       Pertinent History Of Current Problem See initial eval  - Adm 11/28 w/ dysarthria, L facial droop, s/p TNK. COVID+. Hx HTN, HLD, CML, GERD, CAD.  -       Current Method of Nutrition soft to chew textures;chopped;nectar/syrup-thick liquids;no mixed consistencies  - NPO  -MP       Precautions/Limitations, Vision WFL  - WFL  -MP       Precautions/Limitations, Hearing WFL  - WFL  -MP       Prior Level of Function-Communication WFL  - WFL  -MP       Prior Level of Function-Swallowing no diet consistency restrictions  - no diet consistency restrictions   -       Plans/Goals Discussed with patient;family;agreed upon  - patient;family;agreed upon  -       Barriers to Rehab none identified  - none identified  -       Patient's Goals for Discharge patient did not state  - patient did not state  -       Family Goals for Discharge family did not state  - family did not state  -          Pain    Additional Documentation Pain Scale: FACES Pre/Post-Treatment (Group)  - Pain Scale: FACES Pre/Post-Treatment (Group)  -          Pain Scale: FACES Pre/Post-Treatment    Pain: FACES Scale, Pretreatment 0-->no hurt  - 0-->no hurt  -MP       Posttreatment Pain Rating 0-->no hurt  - 0-->no hurt  -MP          Oral Motor Structure and Function    Dentition Assessment edentulous, dentures not available  - edentulous, dentures not available  -       Secretion Management WNL/WFL  - WNL/WFL  -MP       Mucosal Quality moist, healthy  - moist, healthy  -          Oral Musculature and Cranial Nerve Assessment    Oral Motor General Assessment -- unable to assess  -       Oral Labial or Buccal Impairment, Detail, Cranial Nerve VII (Facial): left labial droop  - left labial droop  -          General Eating/Swallowing Observations    Respiratory Support Currently in Use room air  - --       Eating/Swallowing Skills fed by SLP  - fed by SLP  -       Positioning During Eating upright in bed  - upright in bed  -       Utensils Used spoon;cup;straw  - spoon;cup;straw  -       Consistencies Trialed thin liquids;nectar/syrup-thick liquids;pureed;regular textures  - thin liquids;nectar/syrup-thick liquids;pureed;soft to chew textures  -          Clinical Swallow Eval    Pharyngeal Phase -- suspected pharyngeal impairment  -       Clinical Swallow Evaluation Summary Cough x1 w/ thin liquid trial via large/sequential sips. U/a to replicate accross multiple additional trials. No overt s/s of aspiration w/ trials of NTL, pureed, or regular solid  consistencies. Ok for soft/chopped solid diet w/ thin liquids, small/single sips. SLP will f/u 12/1 to ensure no further concerns  - ?able wet vocal quality after multiple sips of thin liquid. No s/sxs w/ nectar-thick, puree, or soft solid. U/a to perform FEES until 24 hrs post-TNK 2' bleeding risk. For now will initiate soft/chopped diet, no mixed consistencies, and nectar-thick liquids. Will plan for repeat clinical swallow eval tomorrow AM to determine if need to proceed w/ FEES.  -MP          Pharyngeal Phase Concerns    Pharyngeal Phase Concerns -- wet vocal quality  -MP       Wet Vocal Quality -- thin  -MP          SLP Evaluation Clinical Impression    SLP Swallowing Diagnosis R/O pharyngeal dysphagia  - suspected pharyngeal dysphagia  -       Functional Impact risk of aspiration/pneumonia  - risk of aspiration/pneumonia  -MP       Rehab Potential/Prognosis, Swallowing good, to achieve stated therapy goals  - good, to achieve stated therapy goals  -       Swallow Criteria for Skilled Therapeutic Interventions Met demonstrates skilled criteria  - demonstrates skilled criteria  -          Recommendations    Predicted Duration Therapy Intervention (Days) until discharge  - until discharge  -       SLP Diet Recommendation soft to chew textures;chopped;thin liquids  - soft to chew textures;chopped;no mixed consistencies;nectar thick liquids  -       Recommended Diagnostics reassess via clinical swallow evaluation  - reassess via clinical swallow evaluation;SLE/Cog/Motor Speech Evaluation;other (see comments)  u/a to complete cog-comm eval this AM as EEG setting up to begin exam  -MP       Recommended Precautions and Strategies upright posture during/after eating;general aspiration precautions;small bites of food and sips of liquid;other (see comments)  small/single sips  - upright posture during/after eating;general aspiration precautions  -MP       Oral Care Recommendations Oral Care  BID/PRN;Toothbrush  - Oral Care BID/PRN;Toothbrush  -       SLP Rec. for Method of Medication Administration meds whole;meds crushed;with thick liquids;with puree;as tolerated  - meds whole;meds crushed;with thick liquids;with puree;as tolerated  -       Monitor for Signs of Aspiration notify SLP if any concerns  - notify SLP if any concerns  -       Anticipated Discharge Disposition (SLP) inpatient rehabilitation facility  - inpatient rehabilitation facility  -                 User Key  (r) = Recorded By, (t) = Taken By, (c) = Cosigned By      Initials Name Effective Dates    MP Rasheed West, MS CCC-SLP 12/28/21 -      Emma Elise, MS CCC-SLP 05/12/23 -                     EDUCATION  The patient has been educated in the following areas:   Dysphagia (Swallowing Impairment).        SLP GOALS       Row Name 11/30/23 7853             Patient will demonstrate functional cognitive-linguistic skills for return to discharge environment    [place] Independently  -      Time frame by discharge  -      Progress/Outcomes new goal  -         SLP Diagnostic Treatment     Patient will participate in further assessment in the following areas reading comprehension;graphic expression  -      Time Frame (Diagnostic) short term goal (STG)  -      Progress/Outcomes (Additional Goal 1, SLP) new goal  -         Attention Goal 1 (SLP)    Improve Attention by Goal 1 (SLP) complete selective attention task;complete sustained attention task;80%;with minimal cues (75-90%)  -      Time Frame (Attention Goal 1, SLP) short term goal (STG)  -      Progress/Outcomes (Attention Goal 1, SLP) new goal  -         Memory Skills Goal 1 (SLP)    Improve Memory Skills Through Goal 1 (SLP) recalling related word lists immediately;recalling unrelated word lists immediately;80%;with minimal cues (75-90%)  -      Time Frame (Memory Skills Goal 1, SLP) short term goal (STG)  -      Progress/Outcomes  (Memory Skills Goal 1, SLP) new goal  -MH         Organizational Skills Goal 1 (SLP)    Improve Thought Organization Through Goal 1 (SLP) completing a divergent naming task;completing a convergent naming task;80%;with minimal cues (75-90%)  -MH      Time Frame (Thought Organization Skills Goal 1, SLP) short term goal (STG)  -MH      Progress/Outcomes (Thought Organization Skills Goal 1, SLP) new goal  -MH                User Key  (r) = Recorded By, (t) = Taken By, (c) = Cosigned By      Initials Name Provider Type    Emma Fishman MS CCC-SLP Speech and Language Pathologist                       Time Calculation:    Time Calculation- SLP       Row Name 11/30/23 1721             Time Calculation- SLP    SLP Start Time 1625  -      SLP Received On 11/30/23  -         Untimed Charges    76803-ES Eval Speech and Production w/ Language Minutes 36  -MH      [id number]-NI Eval Oral Pharyng Swallow Minutes 36  -MH         Total Minutes    Untimed Charges Total Minutes 72  -MH       Total Minutes 72  -MH                User Key  (r) = Recorded By, (t) = Taken By, (c) = Cosigned By      Initials Name Provider Type    Emma Fishman MS CCC-SLP Speech and Language Pathologist                    Therapy Charges for Today       Code Description Service Date Service Provider Modifiers Qty    61765487635 HC ST EVAL ORAL PHARYNG SWALLOW 2 11/30/2023 Emma Elise MS CCC-SLP GN 1    [id number] HC ST EVAL SPEECH AND PROD W LANG  2 11/30/2023 Emma Elise MS CCC-SLP GN 1                 MS BRUCE Fowler  11/30/2023

## 2023-11-30 NOTE — PLAN OF CARE
Goal Outcome Evaluation:      Pt lethargic most of the day and has been sleeping in between assessments, [facility] 12. Pt has been A&Ox2/3 at times today. Pt is able to track nurse in the room and SANDOVAL. Pt was able to pass a swallow evaluation and has been up in the chair for a good part of this shift. F/C placed for urinary retention, UA sent. EKG obtained for bradycardia. Pt daughter at bedside.

## 2023-11-30 NOTE — PROGRESS NOTES
INTENSIVIST   PROGRESS NOTE        SUBJECTIVE     Munira 78 y.o. 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     Interval History:  Doing well.    Headache.    She wants to go home.    Temp  Min: 98 °F (36.7 °C)  Max: 99.5 °F (37.5 °C)       History     Last Reviewed by Pacheco Roberson MD on 2023 at  5:51 PM    Sections Reviewed    Medical, Surgical, Family, Tobacco, Alcohol, Drug Use, Sexual Activity,   Social Documentation    Problem list reviewed by Pacheco Roberson MD on 2023 at  5:51 PM  Medicines reviewed by Pacheco Roberson MD on 2023 at  5:51 PM  Allergies reviewed by Pacheco Roberson MD on 2023 at  5:51 PM       The patient's relevant past medical, surgical and social history were reviewed and updated in Epic as appropriate.        OBJECTIVE     Vitals:  Temp: 98 °F (36.7 °C) (23 1148) Temp  Min: 98 °F (36.7 °C)  Max: 99.5 °F (37.5 °C)   Temp core:      BP: 115/70 (23 1500) BP  Min: 106/62  Max: 136/67   MAP (non-invasive) Noninvasive MAP (mmHg): 93 (23 1500) Noninvasive MAP (mmHg)  Av.4  Min: 65  Max: 104   Pulse: 72 (23 1500) Pulse  Min: 55  Max: 86   Resp: 18 (23 1200) Resp  Min: 16  Max: 18   SpO2: 94 % (23 1500) SpO2  Min: 91 %  Max: 97 %   Device: room air (23 1400)    Flow Rate:   No data recorded         23  2315 23  1654   Weight: 58.9 kg (129 lb 13.6 oz) 58.9 kg (129 lb 13.6 oz)        Intake/Ouptut  24 hrs (7:00AM - 6:59 AM)  Intake & Output (last 3 days)         11/27 0701 11/28 0700 11/28 0701 11/29 0700 11/29 0701 11/30 0700 11/30 0701 12/01 0700    P.O.   50 120    Total Intake(mL/kg)   50 (0.8) 120 (2)    Urine (mL/kg/hr)  700 605 (0.4) 275 (0.5)    Total Output  700 605 275    Net  [phone]                  Physical Examination  Telemetry:  Rhythm: normal sinus rhythm (11/30/23 1400)         Constitutional:  No acute distress.   Cardiovascular: RRR.    Respiratory: Normal breath sounds  No adventitious sounds   Abdominal:  Soft with no tenderness.   Extremities: No Edema   Neurological:   Alert, Oriented, Cooperative.   Best Eye Response: 4-->(E4) spontaneous (11/30/23 1400)  Best Motor Response: 6-->(M6) obeys commands (11/30/23 1400)  Best Verbal Response: 5-->(V5) oriented (11/30/23 1400)  [place] Coma Scale Score: 15 (11/30/23 1400)     NIH Stroke Scale  Interval: baseline (11/30/23 0817)  1a. Level of Consciousness: 0-->Alert, keenly responsive (11/30/23 0817)  1b. LOC Questions: 1-->Answers one question correctly (11/30/23 0817)  1c. LOC Commands: 0-->Performs both tasks correctly (11/30/23 0817)  2. Best Gaze: 0-->Normal (11/30/23 0817)  3. Visual: 0-->No visual loss (11/30/23 0817)  4. Facial Palsy: 1-->Minor paralysis (flattened nasolabial fold, asymmetry on smiling) (11/30/23 0817)  5a. Motor Arm, Left: 0-->No drift, limb holds 90 (or 45) degrees for full 10 secs (11/30/23 0817)  5b. Motor Arm, Right: 0-->No drift, limb holds 90 (or 45) degrees for full 10 secs (11/30/23 0817)  6a. Motor Leg, Left: 0-->No drift, leg holds 30 degree position for full 5 secs (11/30/23 0817)  6b. Motor Leg, Right: 0-->No drift, leg holds 30 degree position for full 5 secs (11/30/23 0817)  7. Limb Ataxia: 0-->Absent (11/30/23 0817)  8. Sensory: 0-->Normal, no sensory loss (11/30/23 0817)  9. Best Language: 1-->Mild-to-moderate aphasia, some obvious loss of fluency or facility of comprehension, without  significant limitation on ideas expressed or form of expression. Reduction of speech and/or comprehension, however, makes conversation. . . (see row details) (11/30/23 0817)  10. Dysarthria: 1-->Mild-to-moderate dysarthria, patient slurs at least some words and, at worst, can be understood with some difficulty (11/30/23 0817)  11. Extinction and Inattention (formerly Neglect): 0-->No abnormality (11/30/23 0817)  Total (NIH Stroke Scale): 4 (11/30/23 0817)    Results Reviewed:  Laboratory  Microbiology  Radiology  Pathology    Hematology:  Results from last 7 days   Lab Units 11/30/23 0351 11/29/23 0652 11/28/23 2103   WBC 10*3/mm3 7.37 8.01 8.15   HEMOGLOBIN g/dL 11.9* 12.8 12.8   MCV fL 89.3 89.7 90.4   PLATELETS 10*3/mm3 200 185 212     Results from last 7 days   Lab Units 11/30/23 0351 11/28/23 2103   IMM GRAN % % 0.3 0.5   NEUTROS ABS 10*3/mm3 4.58 6.57   LYMPHS ABS 10*3/mm3 1.94 0.80   MONOS ABS 10*3/mm3 0.79 0.72   EOS ABS 10*3/mm3 0.01 0.00   BASOS ABS 10*3/mm3 0.03 0.02     Chemistry:  Estimated Creatinine Clearance: 64.2 mL/min (by C-G formula based on SCr of 0.59 mg/dL).    Results from last 7 days   Lab Units 11/30/23 0351 11/29/23 0652   SODIUM mmol/L 141 137   POTASSIUM mmol/L 3.4* 3.9   CHLORIDE mmol/L 103 102   CO2 mmol/L 24.0 23.0   BUN mg/dL 15 12   CREATININE mg/dL 0.59 0.53*   GLUCOSE mg/dL 90 98     Results from last 7 days   Lab Units 11/30/23 0351 11/29/23 0652   CALCIUM mg/dL 8.8 9.2   MAGNESIUM mg/dL 2.3 2.5*   PHOSPHORUS mg/dL 3.1 3.8     Hepatic Panel:  Results from last 7 days   Lab Units 11/28/23 2104   AST (SGOT) U/L 16   ALT (SGPT) U/L 13      Coagulation Labs:  Results from last 7 days   Lab Units 11/28/23  2133   APTT seconds 34.7      Cardiac Labs:  Results from last 7 days   Lab Units 11/28/23  2104   HSTROP T ng/L 10     Biomarkers:  Results from last 7 days   Lab Units 11/30/23  0351 11/28/23 2104   CRP mg/dL 3.37*  --    PROCALCITONIN ng/mL 0.04 0.06   FERRITIN ng/mL  95.74  --      COVID-19  Lab Results   Component Value Date    COVID19 Detected (C) 11/28/2023       Images:  CT Chest Without Contrast Diagnostic    Result Date: 11/30/2023  Impression: 1.Mild patchy airspace changes present within the lung bases bilaterally, likely related to atelectasis. No significant air bronchograms identified. No dominant consolidation.. 2.Ancillary findings as described above. Electronically Signed: Sarah Marin MD  11/30/2023 2:31 AM EST  Workstation ID: [id number]    CT Head Without Contrast    Result Date: 11/30/2023  Impression: No acute intracranial process identified. Electronically Signed: Sarah Marin MD  11/30/2023 2:30 AM EST  Workstation ID: [id number]    EEG    Result Date: 11/29/2023  Diffuse cerebral dysfunction of moderate degree, affecting the right hemisphere more so The triphasic waves noted above are abnormal but nonspecific.  These are most commonly seen due to toxic/metabolic or anoxic cause, although they may also be seen in the setting of seizure disorders This report is transcribed using the Dragon dictation system.      CT Head Without Contrast    Result Date: 11/29/2023  Impression: No acute intracranial abnormality Electronically Signed: Lobo Arteaga MD  11/29/2023 8:11 AM EST  Workstation ID: [id number]    MRI Brain Without Contrast    Result Date: 11/29/2023  Impression: 1.No evidence of hemorrhage, mass effect or midline shift. No evidence of recent or acute ischemia. 2.Moderate periventricular and subcortical FLAIR signal changes likely related to chronic microvascular ischemic change. Electronically Signed: Sarah Marin MD  11/29/2023 1:08 AM EST  Workstation ID: [id number]    CT Head Without Contrast Stroke Protocol    Result Date: 11/29/2023  Impression: No acute intracranial process identified. Electronically Signed: Sarah Marin MD  11/29/2023 12:23 AM EST  Workstation ID: [id number]    XR Chest 1 View    Result Date: 11/28/2023  Impression: Density in the left  lung base which may represent a combination of atelectasis, pneumonia and/or small pleural effusion. Electronically Signed: Leopoldo DO Remigio  11/28/2023 9:38 PM EST  Workstation ID: [id number]    CT Angiogram Head w AI Analysis of LVO    Result Date: 11/28/2023  1.No hemodynamically significant stenosis, large vessel cut or aneurysms in intracranial circulation 2.No hemodynamically significant stenosis, dissection or aneurysms in extracranial circulation. Electronically Signed: Ronald Barragan MD  11/28/2023 8:24 PM CST  Workstation ID: [id number]    CT Angiogram Neck    Result Date: 11/28/2023  1.No hemodynamically significant stenosis, large vessel cut or aneurysms in intracranial circulation 2.No hemodynamically significant stenosis, dissection or aneurysms in extracranial circulation. Electronically Signed: oRnald Barragan MD  11/28/2023 8:24 PM CST  Workstation ID: [id number]    CT CEREBRAL PERFUSION WITH & WITHOUT CONTRAST    Result Date: 11/28/2023   1. No evidence of core infarct nor ischemic brain at risk Electronically Signed: Ronald Barragan MD  11/28/2023 8:20 PM CST  Workstation ID: [id number]    CT Head Without Contrast Stroke Protocol    Result Date: 11/28/2023  Impression: No acute process identified. Electronically Signed: Ronald Barragan MD  11/28/2023 8:15 PM CST  Workstation ID: [id number]     Echo:  Results for orders placed during the hospital encounter of 11/28/23    Adult Transthoracic Echo Complete W/ Cont if Necessary Per Protocol (With Agitated Saline)    Interpretation Summary    Left ventricular systolic function is normal. Left ventricular ejection fraction appears to be 61 - 65%.    Mild aortic valve regurgitation is present.    Mild tricuspid valve regurgitation is present.      Results: Reviewed.  I reviewed the patient's new laboratory and imaging results.  I independently reviewed the patient's new images.    Medications: Reviewed.    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 female admitted on 11/28/2023 with dysarthria and left facial droop, Suspected cerebrovascular accident (CVA) [R09.89], and she received Tenecteplase in the ED.    Assessment/Management/Treatment Plan:    Probably stroke (Left facial droop and Dysarthria). Images negative. S/p Tenecteplase    Deficits much improved.  Pulmonary  SARS-CoV-2 PCR on admission. No symptoms. No known contacts.  Abnormal CXR Left basilar density (on admission)  Cardiovascular  Dyslipidemia   HTN  GI/Hepatology  GERD  CML  Endocrine   Body mass index is 25.36 kg/m². Overweight: 25.0-29.9kg/m2   Hypothyroidism  Lab Results   Component Value Date    TSH 0.608 11/28/2023      No history of Diabetes    Lab Results   Lab Value Date/Time    HGBA1C 5.00 11/29/2023 0652     Results from last 7 days   Lab Units 11/30/23  1145 11/29/23  2338 11/29/23  1735 11/29/23  1133   GLUCOSE mg/dL 106 92 89 87       Diet: Diet: Cardiac Diets; Healthy Heart (2-3 Na+); No Mixed Consistencies; Texture: Soft to Chew (NDD 3); Soft to Chew: Chopped Meat; Fluid Consistency: Nectar Thick  Orders Placed This Encounter      Dietary Nutrition Supplements Magic Cup      Advance Directives: Code Status and Medical Interventions:   Ordered at: 11/28/23 2222     Code Status (Patient has no pulse and is not breathing):    CPR (Attempt to Resuscitate)     Medical Interventions (Patient has pulse or is breathing):    Full Support        DVT prophylaxis:  Mechanical DVT prophylaxis orders are present.     In brief:  Discussed with family at bedside.  Replace K  PT/OT/SLT  CT Chest:  most likely just bibasilar atelectasis.  Disposition: Transfer to Telemetry Unit - Disposition per Stroke Team.  Outpatient Cardiac monitoring    Plan of care and goals reviewed during interdisciplinary rounds.  I discussed the patient's findings and my recommendations with patient    MDM:    Problem(s) High due to: Acute or Chronic illness or injury that may poses a threat to life or bodily function  Data: Moderate due to: Review or results of each unique test, Ordering of each unique test, and Assessment requiring an independent historian    Moderate    OK to floor.    Hospitalist Team will assist with medical management, and assume Primary Attending, once on the Floor.    Thank you.      [x] Primary Attending Intensive Care Medicine - Nutrition Support   [] Consultant    Copied text in this note has been reviewed and is accurate as of 11/30/23

## 2023-12-01 LAB
ANION GAP SERPL CALCULATED.3IONS-SCNC: 10 MMOL/L (ref 5–15)
BASOPHILS # BLD AUTO: 0.02 10*3/MM3 (ref 0–0.2)
BASOPHILS NFR BLD AUTO: 0.3 % (ref 0–1.5)
BUN SERPL-MCNC: 14 MG/DL (ref 8–23)
BUN/CREAT SERPL: 28 (ref 7–25)
CALCIUM SPEC-SCNC: 8.6 MG/DL (ref 8.6–10.5)
CHLORIDE SERPL-SCNC: 105 MMOL/L (ref 98–107)
CO2 SERPL-SCNC: 24 MMOL/L (ref 22–29)
CREAT SERPL-MCNC: 0.5 MG/DL (ref 0.57–1)
DEPRECATED RDW RBC AUTO: 49.6 FL (ref 37–54)
EGFRCR SERPLBLD CKD-EPI 2021: 96.7 ML/MIN/1.73
EOSINOPHIL # BLD AUTO: 0.12 10*3/MM3 (ref 0–0.4)
EOSINOPHIL NFR BLD AUTO: 1.8 % (ref 0.3–6.2)
ERYTHROCYTE [DISTWIDTH] IN BLOOD BY AUTOMATED COUNT: 15.5 % (ref 12.3–15.4)
GLUCOSE BLDC GLUCOMTR-MCNC: 86 MG/DL (ref 70–130)
GLUCOSE SERPL-MCNC: 90 MG/DL (ref 65–99)
HCT VFR BLD AUTO: 35.6 % (ref 34–46.6)
HGB BLD-MCNC: 11.5 G/DL (ref 12–15.9)
IMM GRANULOCYTES # BLD AUTO: 0 10*3/MM3 (ref 0–0.05)
IMM GRANULOCYTES NFR BLD AUTO: 0 % (ref 0–0.5)
LYMPHOCYTES # BLD AUTO: 1.86 10*3/MM3 (ref 0.7–3.1)
LYMPHOCYTES NFR BLD AUTO: 27.7 % (ref 19.6–45.3)
MCH RBC QN AUTO: 28 PG (ref 26.6–33)
MCHC RBC AUTO-ENTMCNC: 32.3 G/DL (ref 31.5–35.7)
MCV RBC AUTO: 86.8 FL (ref 79–97)
MONOCYTES # BLD AUTO: 0.53 10*3/MM3 (ref 0.1–0.9)
MONOCYTES NFR BLD AUTO: 7.9 % (ref 5–12)
NEUTROPHILS NFR BLD AUTO: 4.18 10*3/MM3 (ref 1.7–7)
NEUTROPHILS NFR BLD AUTO: 62.3 % (ref 42.7–76)
NRBC BLD AUTO-RTO: 0 /100 WBC (ref 0–0.2)
PLATELET # BLD AUTO: 186 10*3/MM3 (ref 140–450)
PMV BLD AUTO: 10.7 FL (ref 6–12)
POTASSIUM SERPL-SCNC: 3.8 MMOL/L (ref 3.5–5.2)
RBC # BLD AUTO: 4.1 10*6/MM3 (ref 3.77–5.28)
SODIUM SERPL-SCNC: 139 MMOL/L (ref 136–145)
WBC NRBC COR # BLD AUTO: 6.71 10*3/MM3 (ref 3.4–10.8)

## 2023-12-01 PROCEDURE — 97530 THERAPEUTIC ACTIVITIES: CPT

## 2023-12-01 PROCEDURE — 99232 SBSQ HOSP IP/OBS MODERATE 35: CPT | Performed by: INTERNAL MEDICINE

## 2023-12-01 PROCEDURE — 92610 EVALUATE SWALLOWING FUNCTION: CPT

## 2023-12-01 PROCEDURE — 82948 REAGENT STRIP/BLOOD GLUCOSE: CPT

## 2023-12-01 PROCEDURE — 97116 GAIT TRAINING THERAPY: CPT

## 2023-12-01 PROCEDURE — 99231 SBSQ HOSP IP/OBS SF/LOW 25: CPT | Performed by: NURSE PRACTITIONER

## 2023-12-01 PROCEDURE — 80048 BASIC METABOLIC PNL TOTAL CA: CPT | Performed by: INTERNAL MEDICINE

## 2023-12-01 PROCEDURE — 85025 COMPLETE CBC W/AUTO DIFF WBC: CPT | Performed by: INTERNAL MEDICINE

## 2023-12-01 RX ORDER — LEVOTHYROXINE SODIUM 0.03 MG/1
25 TABLET ORAL DAILY
Status: DISCONTINUED | OUTPATIENT
Start: 2023-12-01 | End: 2023-12-03 | Stop reason: HOSPADM

## 2023-12-01 RX ADMIN — SENNOSIDES AND DOCUSATE SODIUM 2 TABLET: 8.6; 5 TABLET ORAL at 10:06

## 2023-12-01 RX ADMIN — SENNOSIDES AND DOCUSATE SODIUM 2 TABLET: 8.6; 5 TABLET ORAL at 21:09

## 2023-12-01 RX ADMIN — ATORVASTATIN CALCIUM 80 MG: 40 TABLET, FILM COATED ORAL at 21:09

## 2023-12-01 RX ADMIN — ASPIRIN 81 MG CHEWABLE TABLET 324 MG: 81 TABLET CHEWABLE at 10:07

## 2023-12-01 RX ADMIN — ACETAMINOPHEN 650 MG: 325 TABLET ORAL at 00:39

## 2023-12-01 RX ADMIN — LEVOTHYROXINE SODIUM 25 MCG: 0.03 TABLET ORAL at 10:07

## 2023-12-01 RX ADMIN — ACETAMINOPHEN 650 MG: 325 TABLET ORAL at 17:01

## 2023-12-01 NOTE — THERAPY EVALUATION
Acute Care - Speech Language Pathology   Swallow Initial Evaluation [facility]  Clinical Swallow Evaluation       Patient Name: Munira Escobar  : 1945  MRN: [id number]  Today's Date: 2023               Admit Date: 2023    Visit Dx:     ICD-10-CM ICD-9-CM   1. Acute CVA (cerebrovascular accident)  I63.9 434.91   2. Dysphagia, unspecified type  R13.10 787.20   3. 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 Go)     HYSTERECTOMY         SLP Recommendation and Plan  SLP Swallowing Diagnosis: swallow WFL/no suspected pharyngeal impairment (23 114)  SLP Diet Recommendation: soft to chew textures, chopped, thin liquids (23 1145)  Recommended Precautions and Strategies: upright posture during/after eating (23 1145)  SLP Rec. for Method of Medication Administration: as tolerated (23 1145)     Monitor for Signs of Aspiration: notify SLP if any concerns (23 1145)  Recommended Diagnostics: No further SLP services recommended (23 114)  Swallow Criteria for Skilled Therapeutic Interventions Met: no  problems identified which require skilled intervention (12/01/23 1145)  Anticipated Discharge Disposition (SLP): unknown (12/01/23 1145)     Therapy Frequency (Swallow): evaluation only (12/01/23 1145)     Oral Care Recommendations: Oral Care BID/PRN (12/01/23 1145)                                      Oral Care Recommendations: Oral Care BID/PRN (12/01/23 1145)    Plan of Care Reviewed With: patient, family      SWALLOW EVALUATION (last 72 hours)       SLP Adult Swallow Evaluation       Row Name 12/01/23 1145 11/30/23 1625 11/29/23 0915             Rehab Evaluation    Document Type evaluation  -DV re-evaluation  - evaluation  -      Subjective Information no complaints  -DV no complaints  - no complaints  -MP      Patient Observations alert;cooperative  -DV alert;cooperative  - alert  fairly cooperative  -      Patient/Family/Caregiver Comments/Observations family present  -DV Family present  - Dtr present  -      Patient Effort good  -DV good  - adequate  -MP      Symptoms Noted During/After Treatment none  -DV none  - --         General Information    Patient Profile Reviewed yes  -DV yes  - yes  -MP      Pertinent History Of Current Problem see initial eval  -DV See initial eval  - Adm 11/28 w/ dysarthria, L facial droop, s/p TNK. COVID+. Hx HTN, HLD, CML, GERD, CAD.  -MP      Current Method of Nutrition soft to chew textures;chopped;thin liquids  -DV soft to chew textures;chopped;nectar/syrup-thick liquids;no mixed consistencies  - NPO  -MP      Precautions/Limitations, Vision WFL  -DV WFL  - WFL  -      Precautions/Limitations, Hearing WFL  -DV WFL  - WFL  -MP      Prior Level of Function-Communication WFL  -DV WFL  - WFL  -      Prior Level of Function-Swallowing no diet consistency restrictions  -DV no diet consistency restrictions  - no diet consistency restrictions  -      Plans/Goals Discussed with patient;family;agreed upon  -DV patient;family;agreed upon  -  patient;family;agreed upon  -      Barriers to Rehab none identified  - none identified  - none identified  -      Patient's Goals for Discharge patient did not state  -DV patient did not state  - patient did not state  -      Family Goals for Discharge family did not state  -DV family did not state  - family did not state  -         Pain    Additional Documentation Pain Scale: FACES Pre/Post-Treatment (Group)  -DV Pain Scale: FACES Pre/Post-Treatment (Group)  - Pain Scale: FACES Pre/Post-Treatment (Group)  -MP         Pain Scale: FACES Pre/Post-Treatment    Pain: FACES Scale, Pretreatment 0-->no hurt  -DV 0-->no hurt  -MH 0-->no hurt  -MP      Posttreatment Pain Rating 0-->no hurt  -DV 0-->no hurt  -MH 0-->no hurt  -MP         Oral Motor Structure and Function    Dentition Assessment edentulous, dentures not available  - edentulous, dentures not available  - edentulous, dentures not available  -MP      Secretion Management WNL/WFL  -DV WNL/WFL  - WNL/WFL  -MP      Mucosal Quality moist, healthy  -DV moist, healthy  - moist, healthy  -         Oral Musculature and Cranial Nerve Assessment    Oral Motor General Assessment unable to assess  -DV -- unable to assess  -      Oral Labial or Buccal Impairment, Detail, Cranial Nerve VII (Facial): -- left labial droop  - left labial droop  -         General Eating/Swallowing Observations    Respiratory Support Currently in Use room air  - room air  - --      Eating/Swallowing Skills fed by SLP  - fed by SLP  - fed by SLP  -      Positioning During Eating upright in chair  - upright in bed  - upright in bed  -      Utensils Used spoon;cup;straw  - spoon;cup;straw  - spoon;cup;straw  -      Consistencies Trialed thin liquids;pureed  -DV thin liquids;nectar/syrup-thick liquids;pureed;regular textures  - thin liquids;nectar/syrup-thick liquids;pureed;soft to chew textures  -         Respiratory    Respiratory Status WFL   -DV -- --         Clinical Swallow Eval    Oral Prep Phase WFL  -DV -- --      Oral Transit WFL  -DV -- --      Oral Residue WFL  -DV -- --      Pharyngeal Phase no overt signs/symptoms of pharyngeal impairment  -DV -- suspected pharyngeal impairment  -MP      Esophageal Phase unremarkable  -DV -- --      Clinical Swallow Evaluation Summary No overt s/sx aspiration across numerous trials of thin liquid and puree. Oral phase limited by dentition only. Will sign off on swallowing.  -DV Cough x1 w/ thin liquid trial via large/sequential sips. U/a to replicate accross multiple additional trials. No overt s/s of aspiration w/ trials of NTL, pureed, or regular solid consistencies. Ok for soft/chopped solid diet w/ thin liquids, small/single sips. SLP will f/u 12/1 to ensure no further concerns  -MH ?able wet vocal quality after multiple sips of thin liquid. No s/sxs w/ nectar-thick, puree, or soft solid. U/a to perform FEES until 24 hrs post-TNK 2' bleeding risk. For now will initiate soft/chopped diet, no mixed consistencies, and nectar-thick liquids. Will plan for repeat clinical swallow eval tomorrow AM to determine if need to proceed w/ FEES.  -MP         Pharyngeal Phase Concerns    Pharyngeal Phase Concerns -- -- wet vocal quality  -MP      Wet Vocal Quality -- -- thin  -MP         SLP Evaluation Clinical Impression    SLP Swallowing Diagnosis swallow WFL/no suspected pharyngeal impairment  -DV R/O pharyngeal dysphagia  - suspected pharyngeal dysphagia  -      Functional Impact risk of aspiration/pneumonia  -DV risk of aspiration/pneumonia  - risk of aspiration/pneumonia  -      Rehab Potential/Prognosis, Swallowing -- good, to achieve stated therapy goals  - good, to achieve stated therapy goals  -      Swallow Criteria for Skilled Therapeutic Interventions Met no problems identified which require skilled intervention  -DV demonstrates skilled criteria  - demonstrates skilled criteria  -          Recommendations    Therapy Frequency (Swallow) evaluation only  - -- --      Predicted Duration Therapy Intervention (Days) -- until discharge  - until discharge  -      SLP Diet Recommendation soft to chew textures;chopped;thin liquids  - soft to chew textures;chopped;thin liquids  - soft to chew textures;chopped;no mixed consistencies;nectar thick liquids  -      Recommended Diagnostics No further SLP services recommended  - reassess via clinical swallow evaluation  - reassess via clinical swallow evaluation;SLE/Cog/Motor Speech Evaluation;other (see comments)  u/a to complete cog-comm eval this AM as EEG setting up to begin exam  -      Recommended Precautions and Strategies upright posture during/after eating  - upright posture during/after eating;general aspiration precautions;small bites of food and sips of liquid;other (see comments)  small/single sips  - upright posture during/after eating;general aspiration precautions  -      Oral Care Recommendations Oral Care BID/PRN  - Oral Care BID/PRN;Toothbrush  - Oral Care BID/PRN;Toothbrush  -      SLP Rec. for Method of Medication Administration as tolerated  - meds whole;meds crushed;with thick liquids;with puree;as tolerated  - meds whole;meds crushed;with thick liquids;with puree;as tolerated  -      Monitor for Signs of Aspiration notify SLP if any concerns  - notify SLP if any concerns  - notify SLP if any concerns  -      Anticipated Discharge Disposition (SLP) unknown  - inpatient rehabilitation facility  - inpatient rehabilitation facility  -                User Key  (r) = Recorded By, (t) = Taken By, (c) = Cosigned By      Initials Name Effective Dates     Rasheed West, MS CCC-SLP 12/28/21 -     Naima Noguera MS CCC-SLP 06/16/21 -      Emma Elise MS CCC-SLP 05/12/23 -                     EDUCATION  The patient has been educated in the following areas:   Dysphagia (Swallowing  Impairment).        SLP GOALS       Row Name 11/30/23 1625             Patient will demonstrate functional cognitive-linguistic skills for return to discharge environment    [facility]  -      Time frame by discharge  -MH      Progress/Outcomes new goal  -         SLP Diagnostic Treatment     Patient will participate in further assessment in the following areas reading comprehension;graphic expression  -MH      Time Frame (Diagnostic) short term goal (STG)  -MH      Progress/Outcomes (Additional Goal 1, SLP) new goal  -MH         Attention Goal 1 (SLP)    Improve Attention by Goal 1 (SLP) complete selective attention task;complete sustained attention task;80%;with minimal cues (75-90%)  -      Time Frame (Attention Goal 1, SLP) short term goal (STG)  -MH      Progress/Outcomes (Attention Goal 1, SLP) new goal  -MH         Memory Skills Goal 1 (SLP)    Improve Memory Skills Through Goal 1 (SLP) recalling related word lists immediately;recalling unrelated word lists immediately;80%;with minimal cues (75-90%)  -      Time Frame (Memory Skills Goal 1, SLP) short term goal (STG)  -MH      Progress/Outcomes (Memory Skills Goal 1, SLP) new goal  -MH         Organizational Skills Goal 1 (SLP)    Improve Thought Organization Through Goal 1 (SLP) completing a divergent naming task;completing a convergent naming task;80%;with minimal cues (75-90%)  -      Time Frame (Thought Organization Skills Goal 1, SLP) short term goal (STG)  -MH      Progress/Outcomes (Thought Organization Skills Goal 1, SLP) new goal  -                User Key  (r) = Recorded By, (t) = Taken By, (c) = Cosigned By      Initials Name Provider Type    Emma Fishman MS CCC-SLP Speech and Language Pathologist                       Time Calculation:    Time Calculation- SLP       Row Name 12/01/23 1348             Time Calculation- SLP    SLP Start Time 1145  -DV      SLP Received On 12/01/23  -DV         Untimed Charges     SLP Eval/Re-eval  ST Eval Oral Pharyng Swallow - 02459  -DV      77562-[id number] Eval Oral Pharyng Swallow Minutes 40  -DV         Total Minutes    Untimed Charges Total Minutes 40  -DV       Total Minutes 40  -DV                User Key  (r) = Recorded By, (t) = Taken By, (c) = Cosigned By      Initials Name Provider Type    Naima Noguera MS CCC-SLP Speech and Language Pathologist                    Therapy Charges for Today       Code Description Service Date Service Provider Modifiers Qty    [id number]  ST EVAL ORAL PHARYNG SWALLOW 3 12/1/2023 Naima Turcios MS CCC-SLP GN 1                 MS BRUCE Gomez  12/1/2023

## 2023-12-01 NOTE — PROGRESS NOTES
"Stroke Progress Note       Chief Complaint: Left-sided weakness, left facial droop    Subjective    Subjective     Subjective:  Patient is sitting up in the recliner. She has family at bedside. Preparing to work with PT. She has a slight cough. She reports she is feeling \"100%\" and wants to go home. I explained that initially therapy recommended rehab but they will re-evaluate and see if she has gotten stronger. Answered all questions.     Review of Systems   Unable to perform ROS: Acuity of condition (Patient is poor historian, only oriented to herself)   Constitutional:  Negative for fever.            Objective    Objective      Temp:  [98 °F (36.7 °C)-98.6 °F (37 °C)] 98.1 °F (36.7 °C)  Heart Rate:  [55-86] 59  Resp:  [16-20] 16  BP: ()/(51-86) 126/69        Neurological Exam  Mental Status  Awake and alert. Oriented to person, place and time. Oriented to person, place, and time. no dysarthria present. Language is fluent with no aphasia.    Cranial Nerves  CN II: Right visual acuity: Counts fingers. Left visual acuity: Counts fingers. Right normal visual field. Left normal visual field.  CN III, IV, VI: Extraocular movements intact bilaterally. Normal lids and orbits bilaterally. Pupils equal round and reactive to light bilaterally.  CN V:  Right: Facial sensation is normal.  Left: Facial sensation is normal on the left. Reported patient numbness.  CN VII: Full and symmetric facial movement.  CN VIII: Intact hearing bilateral.  CN IX, X: Unable to assess patient did not follow commands.    Motor  Decreased muscle bulk throughout. No fasciculations present. Normal muscle tone. No abnormal involuntary movements.  Generalized weakness, strength is equal bilaterally upper and lower extremity, no drift.    Sensory  Light touch is normal in upper and lower extremities.  No right-sided hemispatial neglect. No left-sided hemispatial neglect.    Coordination  Right: Finger-to-nose normal.Left: Finger-to-nose " normal.      Physical Exam  Constitutional:       General: She is awake. She is not in acute distress.     Appearance: She is not ill-appearing.      Comments: Patient is alert to herself and age disoriented to time situation and place   HENT:      Head: Normocephalic and atraumatic.      Nose: Nose normal.      Mouth/Throat:      Mouth: Mucous membranes are moist.   Eyes:      General: Lids are normal.      Extraocular Movements: Extraocular movements intact.      Pupils: Pupils are equal, round, and reactive to light.   Cardiovascular:      Rate and Rhythm: Normal rate and regular rhythm.   Pulmonary:      Effort: Pulmonary effort is normal.      Comments: Breathing comfortable on room air  Musculoskeletal:         General: Normal range of motion.      Cervical back: Normal range of motion and neck supple.   Skin:     General: Skin is warm and dry.   Neurological:      General: No focal deficit present.      Mental Status: She is alert and oriented to person, place, and time.      Cranial Nerves: No dysarthria.   Psychiatric:         Mood and Affect: Mood normal.         Behavior: Behavior normal.         Results Review:    I reviewed the patient's new clinical results.  Labs reviewed and remarkable for  Potassium 3.4  Creatinine 0.59  BUN 15  WBC 7.37  H&H 11.9\37.5  Platelet 200  AST 16  ALT 13  UA negative  LDL 28, HDL 64  Results for orders placed during the hospital encounter of 11/28/23    Adult Transthoracic Echo Complete W/ Cont if Necessary Per Protocol (With Agitated Saline)    Interpretation Summary    Left ventricular systolic function is normal. Left ventricular ejection fraction appears to be 61 - 65%.    Mild aortic valve regurgitation is present.    Mild tricuspid valve regurgitation is present.    CT Chest Without Contrast Diagnostic    Result Date: 11/30/2023  Impression: 1.Mild patchy airspace changes present within the lung bases bilaterally, likely related to atelectasis. No significant air  bronchograms identified. No dominant consolidation.. 2.Ancillary findings as described above. Electronically Signed: Sarah Marin MD  11/30/2023 2:31 AM EST  Workstation ID: [id number]    CT Head Without Contrast    Result Date: 11/30/2023  Impression: No acute intracranial process identified. Electronically Signed: Sarah Marin MD  11/30/2023 2:30 AM EST  Workstation ID: [id number]    EEG    Result Date: 11/29/2023  Diffuse cerebral dysfunction of moderate degree, affecting the right hemisphere more so The triphasic waves noted above are abnormal but nonspecific.  These are most commonly seen due to toxic/metabolic or anoxic cause, although they may also be seen in the setting of seizure disorders This report is transcribed using the Dragon dictation system.           Assessment/Plan     Assessment/Plan:    This is a 80-year-old white female, with significant health diagnosis for hypertension, hyperlipidemia who presented to [facility] ED for left facial droop, dysarthria.  Imaging of CT head, CTA, CTP revealed no acute abnormality.  Patient is status post IV thrombolytic therapy 11/29/2023 at 2200.  Neurology stroke continues to follow-up.      Antiplatelet PTA: Aspirin 81 mg  Anticoagulant PTA: None      Acute CVA status post IV thrombolytic therapy TNK  -Status post TNK 11/29/2023 at 2200  -Stable 24 hr CT with no hemorrhage   -MRI negative for acute abnormality.  -Continue full dose aspirin 325 mg p.o.  daily for stroke prevention.  -Continue NIH/neurochecks per protocol  -Systolic blood pressure goal normotensive 130/80  -Continue PT/OT/SLP. Currently recommending IP rehab   -Patient will need 14 days Holter on discharge  -Continue high intensity statin Lipitor 80 mg p.o. daily at night for secondary stroke prevention.  -If patient is medically ready, from stroke standpoint patient is stable to be transferred to telemetry floor or rehab   -Patient needs to follow-up with neurology stroke in 3 months    2. Essential  hypertension  -Normal blood pressure goals   -goal <130/80   -today 126/69    3. Hyperlipidemia  -Continue lipitor 80 mg nightly     4. Intermittent headache. Resolved   -CT head 11/30 stable and negative for hemorrhage   -Tylenol 650 as needed for mild pain     5. Asymptomatic COVID infection  -Management by intensivist      Discussed plan of care with patient, ICU team and Dr. Akers.  Neurology stroke we will continue to follow-up.  Thank you for letting us participate in patient care. Please call with questions or concerns.       Glendy Nettles, PENNY  12/01/23  09:56 EST

## 2023-12-01 NOTE — CASE MANAGEMENT/SOCIAL WORK
Continued Stay Note  [facility]     Patient Name: Munira Escobar  MRN: [id number]  Today's Date: 12/1/2023    Admit Date: 11/28/2023    Plan: Home w/Home Health   Discharge Plan       Row Name 12/01/23 1310       Plan    Plan Home w/Home Health    Patient/Family in Agreement with Plan yes    Plan Comments Spoke with patient's daughter by phone.  Therapy recommends inpatient rehab at discharge.  Patient and family prefer to go home with home health.  Referral called to Lauren with [facility] for PT, OT, SLP and skilled nursing.  Order for rolling walker called to Tao with [facility].  Walker to be delivered to patient's bedside prior to discharge.  CM will continue to follow and notify home health when patient discharges.    Final Discharge Disposition Code 06 - home with home health care                   Discharge Codes    No documentation.                 Expected Discharge Date and Time       Expected Discharge Date Expected Discharge Time    Dec 4, 2023               Carlie Rosas RN

## 2023-12-01 NOTE — PLAN OF CARE
Problem: Adult Inpatient Plan of Care  Goal: Plan of Care Review  Outcome: Ongoing, Progressing  Flowsheets (Taken 12/1/2023 1341)  Plan of Care Reviewed With:   patient   family   Goal Outcome Evaluation:  Plan of Care Reviewed With: patient, family      SLP re-evaluation completed. Will sign-off. Please see note for further details and recommendations.              Anticipated Discharge Disposition (SLP): unknown

## 2023-12-01 NOTE — DISCHARGE PLACEMENT REQUEST
"Munira Escobar (78 y.o. Female)     Case Management  [phone]        Date of Birth   1945    Social Security Number       Address   [address] [place] [id number]    Home Phone   [phone]    MRN   [id number]       Faith   Sabianist    Marital Status                               Admission Date   11/28/23    Admission Type   Emergency    Admitting Provider   Yosef Wright MD    Attending Provider   Pacheco Roberson MD    Department, Room/Bed   [address], [address]/1       Discharge Date       Discharge Disposition       Discharge Destination                                 Attending Provider: Pacheco Roberson MD    Allergies: Morphine And Related, Penicillins, Valsartan, Sulfa Antibiotics    Isolation: Contact Air   Infection: COVID (confirmed) (11/28/23)   Code Status: CPR    Ht: 152.4 cm (60\")   Wt: 58.9 kg (129 lb 13.6 oz)    Admission Cmt: None   Principal Problem: Suspected cerebrovascular accident (CVA) [R09.89]                   Active Insurance as of 11/28/2023       Primary Coverage       Payor Plan Insurance Group Employer/Plan Group    HUMANA MEDICARE REPLACEMENT HUMANA MEDICARE REPLACEMENT [id number]       Payor Plan Address Payor Plan Phone Number Payor Plan Fax Number Effective Dates    [address] [address] [phone]  1/1/2011 - None Entered    [facility] [id number]         Subscriber Name Subscriber Birth Date Member ID       MUNIRA ESCOBAR 1945 [id number]                     Emergency Contacts        (Rel.) Home Phone Work Phone Mobile Phone    CurtisRosalina heath (Daughter) -- -- [phone]    LuzShankar (Spouse) [phone] -- --    Peggy Al (Daughter) [phone] -- --           Contains critical data COVID-19 and FLU A/B PCR, 1 HR TAT - Swab, Nasopharynx  Order: [id number] - Part of Panel Order [id number]  Status: Final result       Visible to patient: No (not released)       Next appt: 12/11/2023 at 01:00 PM in Cardiology (Michi " SAMY Gamez    Specimen Information: Nasopharynx; Swab   0 Result Notes      Component  Ref Range & Units    COVID19  Not Detected - Ref. Range Detected Critical    Influenza A PCR  Not Detected Not Detected   Influenza B PCR  Not Detected Not Detected   Resulting Agency [facility]              Narrative  Performed by: [facility]  Fact sheet for providers: https://www.fda.gov/media/853464/download    Fact sheet for patients: https://www.fda.gov/media/318409/download    Test performed by PCR.  Influenza A and Influenza B negative results should be considered presumptive in samples that have a positive SARS-CoV-2 result.    Competitive inhibition studies showed that SARS-CoV-2 virus, when present at concentrations above 3.6E+04 copies/mL, can inhibit the detection and amplification of influenza A and influenza B virus RNA if present at or below 1.8E+02 copies/mL or 4.9E+02 copies/mL, respectively, and may lead to false negative influenza virus results. If co-infection with influenza A or influenza B virus is suspected in samples with a positive SARS-CoV-2 result, the sample should be re-tested with another FDA cleared, approved, or authorized influenza test, if influenza virus detection would change clinical management.      Specimen Collected: 11/28/23 22:23 EST Last Resulted: 11/28/23 23:33 EST       Order Details        View Encounter        Lab and Collection Details        Routing        Result History     View All Conversations on this Encounter           Result Care Coordination      Patient Communication     Not Released  Not seen Back to Top             Insurance Information                  HUMANA MEDICARE REPLACEMENT/HUMANA MEDICARE REPLACEMENT Phone: [phone]    Subscriber: Munira Escobar Subscriber#: [id number]    Group#: [id number] Precert#: [id number]

## 2023-12-01 NOTE — PLAN OF CARE
Goal Outcome Evaluation:  Plan of Care Reviewed With: patient, grandchild(tameka)        Progress: improving  Outcome Evaluation: Pt demonstrated improved activity tolerance, completing ambulation and functional t/f with Melissa and UE support. Pt continues to be limited by generalized weakness, decreased coordination, and balance deficits. Will continue to progress as able to promote return to PLOF. PT rec IRF at d/c but will monitor progress closely.      Anticipated Discharge Disposition (PT): inpatient rehabilitation facility

## 2023-12-01 NOTE — THERAPY TREATMENT NOTE
Patient Name: Munira Escobar  : 1945    MRN: [id number]                              Today's Date: 2023       Admit Date: 2023    Visit Dx:     ICD-10-CM ICD-9-CM   1. Acute CVA (cerebrovascular accident)  I63.9 434.91   2. Dysphagia, unspecified type  R13.10 787.20   3. 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 Go)     HYSTERECTOMY        General Information       Row Name 23 1104          Physical Therapy Time and Intention    Document Type therapy note (daily note)  -ES     Mode of Treatment physical therapy  -ES       Row Name 23 1104          General Information    Patient Profile Reviewed yes  -ES     Existing Precautions/Restrictions fall  -ES     Barriers to Rehab medically complex;cognitive status  -ES       Row Name 23 1104          Cognition    Orientation Status (Cognition) oriented to;person;time;verbal cues/prompts needed for orientation  -ES       Row Name 23 1104          Safety Issues, Functional Mobility    Safety Issues Affecting Function (Mobility) awareness of need for  assistance;impulsivity;insight into deficits/self-awareness;safety precaution awareness;safety precautions follow-through/compliance;sequencing abilities  -ES     Impairments Affecting Function (Mobility) balance;cognition;coordination;endurance/activity tolerance;strength;range of motion (ROM);postural/trunk control  -ES     Cognitive Impairments, Mobility Safety/Performance awareness, need for assistance;insight into deficits/self-awareness;problem-solving/reasoning;safety precaution follow-through;safety precaution awareness;sequencing abilities  -ES               User Key  (r) = Recorded By, (t) = Taken By, (c) = Cosigned By      Initials Name Provider Type    ES Danica Guardado, PT Physical Therapist                   Mobility       Row Name 12/01/23 1105          Bed Mobility    Comment, (Bed Mobility) UIC  -ES       Row Name 12/01/23 1105          Sit-Stand Transfer    Sit-Stand [place] (Transfers) contact guard;verbal cues  -ES     Assistive Device (Sit-Stand Transfers) other (see comments)  UE support  -ES     Comment, (Sit-Stand Transfer) v/c for sequencing. STS x2: from chair, from commode.  -ES       Row Name 12/01/23 1105          Gait/Stairs (Locomotion)    [place] Level (Gait) minimum assist (75% patient effort);verbal cues  -ES     Assistive Device (Gait) other (see comments)  UE support  -ES     Distance in Feet (Gait) 20+20  -ES     Deviations/Abnormal Patterns (Gait) base of support, narrow;karlee decreased;gait speed decreased;stride length decreased;weight shifting decreased;bilateral deviations  -ES     Bilateral Gait Deviations forward flexed posture  -ES     Comment, (Gait/Stairs) Pt amb multiple short bouts within room with Melissa and UE support. Demo'd narrow FABRICIO with decreased stride length. Pt initially unsteady with decreased L sided coordination but improved with activity. No LOB. Further mobility limited by fatigue.  -ES               User Key  (r) = Recorded By, (t) = Taken  By, (c) = Cosigned By      Initials Name Provider Type    ES Danica Guardado PT Physical Therapist                   Obj/Interventions       Row Name 12/01/23 1107          Balance    Balance Assessment sitting static balance;sitting dynamic balance;sit to stand dynamic balance;standing static balance;standing dynamic balance  -ES     Static Sitting Balance standby assist  -ES     Dynamic Sitting Balance contact guard  -ES     Position, Sitting Balance unsupported;sitting in chair;other (see comments)  commode  -ES     Sit to Stand Dynamic Balance contact guard;verbal cues  -ES     Static Standing Balance minimal assist  -ES     Dynamic Standing Balance minimal assist;verbal cues  -ES     Position/Device Used, Standing Balance supported;other (see comments)  UE support  -ES     Balance Interventions sitting;standing;sit to stand;supported;dynamic;occupation based/functional task  -ES     Comment, Balance no LOB  -ES               User Key  (r) = Recorded By, (t) = Taken By, (c) = Cosigned By      Initials Name Provider Type    Danica Torres PT Physical Therapist                   Goals/Plan    No documentation.                  Clinical Impression       Row Name 12/01/23 1109          Pain    Pretreatment Pain Rating 0/10 - no pain  -ES     Posttreatment Pain Rating 0/10 - no pain  -ES     Pre/Posttreatment Pain Comment tolerated  -ES     Pain Intervention(s) Repositioned;Ambulation/increased activity  -ES       Row Name 12/01/23 1109          Plan of Care Review    Plan of Care Reviewed With patient;grandchild(tameka)  -ES     Progress improving  -ES     Outcome Evaluation Pt demonstrated improved activity tolerance, completing ambulation and functional t/f with Melissa and UE support. Pt continues to be limited by generalized weakness, decreased coordination, and balance deficits. Will continue to progress as able to promote return to PLOF. PT rec IRF at d/c but will monitor progress closely.  -ES       Row  Name 12/01/23 1109          Therapy Assessment/Plan (PT)    Rehab Potential (PT) good, to achieve stated therapy goals  -ES     Criteria for Skilled Interventions Met (PT) yes;meets criteria;skilled treatment is necessary  -ES     Therapy Frequency (PT) daily  -ES       Row Name 12/01/23 1109          Vital Signs    Pre Systolic BP Rehab 128  -ES     Pre Treatment Diastolic BP 69  -ES     Post Systolic BP Rehab 133  -ES     Post Treatment Diastolic BP 65  -ES     Pretreatment Heart Rate (beats/min) 62  -ES     Posttreatment Heart Rate (beats/min) 64  -ES     Pre SpO2 (%) 95  -ES     O2 Delivery Pre Treatment room air  -ES     O2 Delivery Intra Treatment room air  -ES     Post SpO2 (%) 97  -ES     O2 Delivery Post Treatment room air  -ES     Pre Patient Position Sitting  -ES     Intra Patient Position Standing  -ES     Post Patient Position Sitting  -ES       Row Name 12/01/23 1109          Positioning and Restraints    Pre-Treatment Position sitting in chair/recliner  -ES     Post Treatment Position chair  -ES     In Chair notified nsg;reclined;sitting;call light within reach;encouraged to call for assist;exit alarm on;with family/caregiver;waffle cushion;legs elevated  -ES               User Key  (r) = Recorded By, (t) = Taken By, (c) = Cosigned By      Initials Name Provider Type    ES Danica Guardado, PT Physical Therapist                   Outcome Measures       Row Name 12/01/23 1111          How much help from another person do you currently need...    Turning from your back to your side while in flat bed without using bedrails? 3  -ES     Moving from lying on back to sitting on the side of a flat bed without bedrails? 3  -ES     Moving to and from a bed to a chair (including a wheelchair)? 3  -ES     Standing up from a chair using your arms (e.g., wheelchair, bedside chair)? 3  -ES     Climbing 3-5 steps with a railing? 2  -ES     To walk in hospital room? 3  -ES     AM-PAC 6 Clicks Score (PT) 17  -ES      Highest Level of Mobility Goal 5 --> Static standing  -ES       Row Name 12/01/23 1111          Functional Assessment    Outcome Measure Options AM-PAC 6 Clicks Basic Mobility (PT)  -ES               User Key  (r) = Recorded By, (t) = Taken By, (c) = Cosigned By      Initials Name Provider Type    Danica Torres PT Physical Therapist                                 Physical Therapy Education       Title: PT OT SLP Therapies (In Progress)       Topic: Physical Therapy (In Progress)       Point: Mobility training (In Progress)       Learning Progress Summary             Patient Acceptance, E,D, NR by MB at 11/29/2023 1431   Family Acceptance, E,D, NR by MB at 11/29/2023 1431                         Point: Home exercise program (In Progress)       Learning Progress Summary             Patient Acceptance, E,D, NR by MB at 11/29/2023 1431   Family Acceptance, E,D, NR by MB at 11/29/2023 1431                         Point: Body mechanics (In Progress)       Learning Progress Summary             Patient Acceptance, E,D, NR by MB at 11/29/2023 1431   Family Acceptance, E,D, NR by MB at 11/29/2023 1431                         Point: Precautions (In Progress)       Learning Progress Summary             Patient Acceptance, E,D, NR by MB at 11/29/2023 1431   Family Acceptance, E,D, NR by MB at 11/29/2023 1431                                         User Key       Initials Effective Dates Name Provider Type Discipline    MB 06/16/21 -  Camilla Sosa, PT Physical Therapist PT                  PT Recommendation and Plan     Plan of Care Reviewed With: patient, grandchild(tameka)  Progress: improving  Outcome Evaluation: Pt demonstrated improved activity tolerance, completing ambulation and functional t/f with Melissa and UE support. Pt continues to be limited by generalized weakness, decreased coordination, and balance deficits. Will continue to progress as able to promote return to PLOF. PT rec IRF at d/c but will monitor  progress closely.     Time Calculation:         PT Charges       Row Name 12/01/23 1111             Time Calculation    Start Time 1025  -ES      PT Received On 12/01/23  -ES      PT Goal Re-Cert Due Date 12/09/23  -ES         Time Calculation- PT    Total Timed Code Minutes- PT 33 minute(s)  -ES         Timed Charges    90535 - Gait Training Minutes  18  -ES      52322 - PT Therapeutic Activity Minutes 15  -ES         Total Minutes    Timed Charges Total Minutes 33  -ES       Total Minutes 33  -ES                User Key  (r) = Recorded By, (t) = Taken By, (c) = Cosigned By      Initials Name Provider Type    Danica Torres PT Physical Therapist                  Therapy Charges for Today       Code Description Service Date Service Provider Modifiers Qty    94366398398 HC GAIT TRAINING EA 15 MIN 12/1/2023 Danica Guardado, PT GP 1    31575662650 HC PT THERAPEUTIC ACT EA 15 MIN 12/1/2023 Danica Guardado, PT GP 1            PT G-Codes  Outcome Measure Options: AM-PAC 6 Clicks Basic Mobility (PT)  AM-PAC 6 Clicks Score (PT): 17  AM-PAC 6 Clicks Score (OT): 9  Modified [place] Scale: 4 - Moderately severe disability.  Unable to walk without assistance, and unable to attend to own bodily needs without assistance.  PT Discharge Summary  Anticipated Discharge Disposition (PT): inpatient rehabilitation facility    Danica Guardado PT  12/1/2023

## 2023-12-01 NOTE — PROGRESS NOTES
INTENSIVIST   PROGRESS NOTE        SUBJECTIVE     Munira 78 y.o. 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     Interval History:  Uneventful night.    On ambient air.    No respiratory symptoms.    She wants to go home.    Temp  Min: 98 °F (36.7 °C)  Max: 98.6 °F (37 °C)       History     Last Reviewed by Pacheco Roberson MD on 2023 at  5:51 PM    Sections Reviewed    Medical, Surgical, Family, Tobacco, Alcohol, Drug Use, Sexual Activity,   Social Documentation    Problem list reviewed by Pacheco Roberson MD on 2023 at  5:51 PM  Medicines reviewed by Pacheco Roberson MD on 2023 at  5:51 PM  Allergies reviewed by Pacheco Roberson MD on 2023 at  5:51 PM       The patient's relevant past medical, surgical and social history were reviewed and updated in Epic as appropriate.        OBJECTIVE     Vitals:  Temp: 98 °F (36.7 °C) (23 1224) Temp  Min: 98 °F (36.7 °C)  Max: 98.6 °F (37 °C)   Temp core:      BP: 134/71 (23 1224) BP  Min: 92/64  Max: 158/74   MAP (non-invasive) Noninvasive MAP (mmHg): 99 (23 1224) Noninvasive MAP (mmHg)  Av.6  Min: 65  Max: 126   Pulse: 60 (23 122) Pulse  Min: 55  Max: 80   Resp: 16 (23 122) Resp  Min: 16  Max: 20   SpO2: 98 % (23 1224) SpO2  Min: 90 %  Max: 98 %   Device: room air (23)    Flow Rate:   No data recorded         235 23  165   Weight: 58.9 kg (129 lb 13.6 oz) 58.9 kg  (129 lb 13.6 oz)        Intake/Ouptut 24 hrs (7:00AM - 6:59 AM)  Intake & Output (last 3 days)         11/28 0701 11/29 0700 11/29 0701 11/30 0700 11/30 0701 12/01 0700 12/01 0701 12/02 0700    P.O.  50 540     I.V. (mL/kg)   500 (8.5)     Total Intake(mL/kg)  50 (0.8) 1040 (17.7)     Urine (mL/kg/hr) 700 605 (0.4) 1180 (0.8)     Total Output      Net -700 -096 -343                   Physical Examination  Telemetry:  Rhythm: normal sinus rhythm, sinus arrhythmia (12/01/23 1000)         Constitutional:  No acute distress.   Cardiovascular: RRR.    Respiratory: Normal breath sounds  No adventitious sounds   Abdominal:  Soft with no tenderness.   Extremities: No Edema   Neurological:   Awake. Some periods of confusion, but she was OK this morning.   Best Eye Response: 4-->(E4) spontaneous (12/01/23 1000)  Best Motor Response: 6-->(M6) obeys commands (12/01/23 1000)  Best Verbal Response: 4-->(V4) confused (12/01/23 1000)  [place] Coma Scale Score: 14 (12/01/23 1000)     NIH Stroke Scale  Interval:  (shift change) (11/30/23 1900)  1a. Level of Consciousness: 0-->Alert, keenly responsive (12/01/23 0700)  1b. LOC Questions: 0-->Answers both questions correctly (12/01/23 0700)  1c. LOC Commands: 0-->Performs both tasks correctly (12/01/23 0700)  2. Best Gaze: 0-->Normal (12/01/23 0700)  3. Visual: 0-->No visual loss (12/01/23 0700)  4. Facial Palsy: 0-->Normal symmetrical movements (12/01/23 0700)  5a. Motor Arm, Left: 0-->No drift, limb holds 90 (or 45) degrees for full 10 secs (12/01/23 0700)  5b. Motor Arm, Right: 0-->No drift, limb holds 90 (or 45) degrees for full 10 secs (12/01/23 0700)  6a. Motor Leg, Left: 0-->No drift, leg holds 30 degree position for full 5 secs (12/01/23 0700)  6b. Motor Leg, Right: 0-->No drift, leg holds 30 degree position for full 5 secs (12/01/23 0700)  7. Limb Ataxia: 0-->Absent (12/01/23 0700)  8. Sensory: 0-->Normal, no sensory loss (12/01/23 0700)  9. Best Language:  1-->Mild-to-moderate aphasia, some obvious loss of fluency or facility of comprehension, without significant limitation on ideas expressed or form of expression. Reduction of speech and/or comprehension, however, makes conversation. . . (see row details) (12/01/23 0700)  10. Dysarthria: 1-->Mild-to-moderate dysarthria, patient slurs at least some words and, at worst, can be understood with some difficulty (12/01/23 0700)  11. Extinction and Inattention (formerly Neglect): 0-->No abnormality (12/01/23 0700)  Total (NIH Stroke Scale): 2 (12/01/23 0700)    Results Reviewed:  Laboratory  Microbiology  Radiology  Pathology    Hematology:  Results from last 7 days   Lab Units 12/01/23 0323 11/30/23 0351 11/29/23 0652   WBC 10*3/mm3 6.71 7.37 8.01   HEMOGLOBIN g/dL 11.5* 11.9* 12.8   MCV fL 86.8 89.3 89.7   PLATELETS 10*3/mm3 186 200 185     Results from last 7 days   Lab Units 12/01/23 0323 11/30/23 0351 11/28/23  2103   IMM GRAN % % 0.0 0.3 0.5   NEUTROS ABS 10*3/mm3 4.18 4.58 6.57   LYMPHS ABS 10*3/mm3 1.86 1.94 0.80   MONOS ABS 10*3/mm3 0.53 0.79 0.72   EOS ABS 10*3/mm3 0.12 0.01 0.00   BASOS ABS 10*3/mm3 0.02 0.03 0.02     Chemistry:  Estimated Creatinine Clearance: 75.7 mL/min (A) (by C-G formula based on SCr of 0.5 mg/dL (L)).    Results from last 7 days   Lab Units 12/01/23 0323 11/30/23 0351   SODIUM mmol/L 139 141   POTASSIUM mmol/L 3.8 3.4*   CHLORIDE mmol/L 105 103   CO2 mmol/L 24.0 24.0   BUN mg/dL 14 15   CREATININE mg/dL 0.50* 0.59   GLUCOSE mg/dL 90 90     Results from last 7 days   Lab Units 12/01/23 0323 11/30/23 0351 11/29/23 0652   CALCIUM mg/dL 8.6 8.8 9.2   MAGNESIUM mg/dL  --  2.3 2.5*   PHOSPHORUS mg/dL  --  3.1 3.8       COVID-19  Lab Results   Component Value Date    COVID19 Detected (C) 11/28/2023     Images:  CT Chest Without Contrast Diagnostic    Result Date: 11/30/2023  Impression: 1.Mild patchy airspace changes present within the lung bases bilaterally, likely related to  atelectasis. No significant air bronchograms identified. No dominant consolidation.. 2.Ancillary findings as described above. Electronically Signed: Sarah Marin MD  11/30/2023 2:31 AM EST  Workstation ID: [id number]    CT Head Without Contrast    Result Date: 11/30/2023  Impression: No acute intracranial process identified. Electronically Signed: Sarah Marin MD  11/30/2023 2:30 AM EST  Workstation ID: [id number]     Echo:  Results for orders placed during the hospital encounter of 11/28/23    Adult Transthoracic Echo Complete W/ Cont if Necessary Per Protocol (With Agitated Saline)    Interpretation Summary    Left ventricular systolic function is normal. Left ventricular ejection fraction appears to be 61 - 65%.    Mild aortic valve regurgitation is present.    Mild tricuspid valve regurgitation is present.      Results: Reviewed.  I reviewed the patient's new laboratory and imaging results.  I independently reviewed the patient's new images.    Medications: Reviewed.    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 female admitted on 11/28/2023 with dysarthria and left facial droop, Suspected cerebrovascular accident (CVA) [R09.89], and she received Tenecteplase in the ED.    Assessment/Management/Treatment Plan:    Probably stroke (Left facial droop and Dysarthria). Images negative. S/p Tenecteplase    Deficits much improved.  Pulmonary  SARS-CoV-2 PCR on admission. No symptoms. No known contacts.  Abnormal CXR Left basilar density (on admission)  CT Chest: most likely just bibasilar  atelectasis.  Cardiovascular  Dyslipidemia   HTN  GI/Hepatology  GERD  CML  Endocrine   Body mass index is 25.36 kg/m². Overweight: 25.0-29.9kg/m2   Hypothyroidism  Lab Results   Component Value Date    TSH 0.608 11/28/2023      No history of Diabetes    Lab Results   Lab Value Date/Time    HGBA1C 5.00 11/29/2023 0652     Results from last 7 days   Lab Units 11/30/23  1145 11/29/23  2338 11/29/23  1735 11/29/23  1133   GLUCOSE mg/dL 106 92 89 87       Diet: Diet: Cardiac Diets; Healthy Heart (2-3 Na+); Texture: Soft to Chew (NDD 3); Soft to Chew: Chopped Meat; Fluid Consistency: Thin (IDDSI 0)  Orders Placed This Encounter      Dietary Nutrition Supplements Magic Cup      Advance Directives: Code Status and Medical Interventions:   Ordered at: 11/28/23 2222     Code Status (Patient has no pulse and is not breathing):    CPR (Attempt to Resuscitate)     Medical Interventions (Patient has pulse or is breathing):    Full Support        DVT prophylaxis:  Mechanical DVT prophylaxis orders are present.     In brief:  Discussed with family at bedside.  Resume her levothyroxine  Continue PT/OT/SLT  Disposition: Transfer to Telemetry Unit (awaiting bed since yesterday).  Outpatient Cardiac monitoring  CM: [facility]    Plan of care and goals reviewed during interdisciplinary rounds.  I discussed the patient's findings and my recommendations with patient    MDM:    Problem(s) High due to: Acute or Chronic illness or injury that may poses a threat to life or bodily function  Data: Moderate due to: Review or results of each unique test, Ordering of each unique test, and Assessment requiring an independent historian    Moderate    OK to floor.    Hospitalist Team will assist with medical management, and assume Primary Attending, once on the Floor.    Thank you.      [x] Primary Attending Intensive Care Medicine - Nutrition Support   [] Consultant    Copied text in this note has been reviewed and is accurate as  of 12/01/23

## 2023-12-01 NOTE — DISCHARGE PLACEMENT REQUEST
"Munira Escobar (78 y.o. Female)     Case Management  [phone]        Date of Birth   1945    Social Security Number       Address   [address] [facility] [address]    Home Phone   [phone]    MRN   [id number]       Protestant   Alevism    Marital Status                               Admission Date   23    Admission Type   Emergency    Admitting Provider   Yosef Wright MD    Attending Provider   Pacheco Roberson MD    Department, Room/Bed   [facility] 3F, S324/1       Discharge Date       Discharge Disposition       Discharge Destination                                 Attending Provider: Pacheco Roberson MD    Allergies: Morphine And Related, Penicillins, Valsartan, Sulfa Antibiotics    Isolation: Contact Air   Infection: COVID (confirmed) (23)   Code Status: CPR    Ht: 152.4 cm (60\")   Wt: 58.9 kg (129 lb 13.6 oz)    Admission Cmt: None   Principal Problem: Suspected cerebrovascular accident (CVA) [R09.89]                   Active Insurance as of 2023       Primary Coverage       Payor Plan Insurance Group Employer/Plan Group    HUMANA MEDICARE REPLACEMENT HUMANA MEDICARE REPLACEMENT [id number]       Payor Plan Address Payor Plan Phone Number Payor Plan Fax Number Effective Dates    [address] [address] [phone]  2011 - None Entered    [facility] [address]         Subscriber Name Subscriber Birth Date Member ID       MUNIRA ESCOBAR 1945 [id number]                     Emergency Contacts        (Rel.) Home Phone Work Phone Mobile Phone    Rosalina Acevedo (Daughter) -- -- [phone]    LuzShankar (Spouse) [phone] -- --    Peggy Al (Daughter) [phone] -- --             [address]  [address] [facility] [address]  Phone:  [phone]  Fax:  [phone] Date: Dec 1, 2023      Ambulatory Referral to Home Health     Patient:  Munira Escobar MRN:  [id number]   [address]  [facility] [address] : "  1945  N:    Phone: [phone] Sex:  F      INSURANCE PAYOR PLAN GROUP # SUBSCRIBER ID   Primary:    HUMANA MEDICARE REPLACEMENT [id number]      Referring Provider Information:  JOSESERGIO Phone: [phone] Fax: [phone]       Referral Information:   # Visits:  999 Referral Type: Home Health [42]   Urgency:  Routine Referral Reason: Specialty Services Required   Start Date: Dec 1, 2023 End Date:  To be determined by Insurer   Diagnosis: Acute CVA (cerebrovascular accident) (I63.9 [ICD-10-CM] 434.91 [ICD-9-CM])      Refer to Dept:   Refer to Provider:   Refer to Provider Phone:   Refer to Facility:       Face to Face Visit Date: 12/1/2023  Follow-up provider for Plan of Care? I treated the patient in an acute care facility and will not continue treatment after discharge.  Follow-up provider: REECE ARIZA [[id number]]  Reason/Clinical Findings: ICH  Describe mobility limitations that make leaving home difficult: impaired functional mobility, balance, gait and endurance  Nursing/Therapeutic Services Requested: Skilled Nursing  Nursing/Therapeutic Services Requested: Physical Therapy  Nursing/Therapeutic Services Requested: Occupational Therapy  Nursing/Therapeutic Services Requested: Speech Therapy  Skilled nursing orders: Cardiopulmonary assessments  Skilled nursing orders: Neurovascular assessments  PT orders: Therapeutic exercise  PT orders: Gait Training  PT orders: Transfer training  PT orders: Strengthening  PT orders: Home safety assessment  Weight Bearing Status: As Tolerated  Occupational orders: Activities of daily living  Occupational orders: Energy conservation  Occupational orders: Strengthening  Occupational orders: Home safety assessment  SLP orders: Dysphagia therapy  Frequency: 1 Week 1     This document serves as a request of services and does not constitute Insurance authorization or approval of services.  To determine eligibility, please contact the  members Insurance carrier to verify and review coverage.     If you have medical questions regarding this request for services. Please contact [address] at [phone] during normal business hours.        Verbal Order Mode: Telephone with readback   Authorizing Provider: Pacheco Roberson MD  Authorizing Provider's NPI: [id number]     Order Entered By: Carlie Rosas RN 12/1/2023 12:56 PM     Electronically signed by:  Pacheco Roberson MD          History & Physical        Yosef Wright MD at 11/28/23 2203            INTENSIVIST   INITIAL HOSPITAL VISIT NOTE     Chief Complaint: Stroke symptoms; Transferred to [facility] ICU for post-tPA management     Subjective  FELISA Escobar is a 78 y.o. female former smoker with PMH of HTN, dyslipidemia, hypothyroidism, CML, and GERD who presents to [facility] ED on 11/28/23 via flight EMS for evaluation of dysarthria and left facial droop concerning for stroke.     LKW ~1930 per the . She presented with NIH 10. CT head, CTP, and CTA head/neck all negative. EKG shows NSR and -140s. She was evaluated by our stroke neurology services deemed a candidate for thrombolytic therapy and will be admitted to the ICU for higher level of care.     CXR does show a possible LLL infiltrate and blunting of the costophrenic angle.     Prior carotid duplex from 2016 did show bilateral 16-49% stenosis. Prior holter monitoring at this time was unrevealing for atrial fibrillation. TTE displayed an EF 60-65%, grade I diastolic function, and insignificant valvular disease. Agitated saline study was not performed.     Time spent: 8 minutes  Electronically signed by PENNY Fowler, 11/28/23, 10:20 PM EST.    PMH: She  has a past medical history of Acid reflux, Gout, History of leukemia, Hyperlipidemia, Hypertension, Hypothyroid, Leukemia, Myocardial infarction, and Vitamin D deficiency.   PSxH: She  has a past surgical history that includes Hysterectomy; Appendectomy; and  "Cardiac catheterization (03/31/2020).      Medications:  No current facility-administered medications on file prior to encounter.     Current Outpatient Medications on File Prior to Encounter   Medication Sig    aspirin 81 MG EC tablet Take 1 tablet by mouth Daily.    atorvastatin (LIPITOR) 20 MG tablet Take 1 tablet by mouth Daily.    cholecalciferol (VITAMIN D3) 25 MCG (1000 UT) tablet Take 1 tablet by mouth Daily.    folic acid (FOLVITE) 1 MG tablet Take 1 tablet by mouth Daily.    lansoprazole (PREVACID) 30 MG capsule Take 1 capsule by mouth Daily.    levothyroxine (SYNTHROID, LEVOTHROID) 25 MCG tablet Take 1 tablet by mouth Daily.    loratadine (CLARITIN) 10 MG tablet Take 1 tablet by mouth Daily.    meclizine (ANTIVERT) 12.5 MG tablet As Needed.    montelukast (SINGULAIR) 10 MG tablet Take 1 tablet by mouth Every Night.    olmesartan (Benicar) 20 MG tablet Take 1 tablet by mouth Daily.    potassium chloride (K-DUR) 10 MEQ CR tablet Take 1 tablet by mouth Daily.       Allergies: She is allergic to morphine and related, penicillins, valsartan, and sulfa antibiotics.   FH: Her family history includes Heart attack in her father; Hyperlipidemia in her father; Hypertension in her father.   SH: She  reports that she quit smoking about 49 years ago. Her smoking use included cigarettes. She has never used smokeless tobacco. She reports that she does not drink alcohol and does not use drugs.     The patient's relevant past medical, surgical and social history were reviewed and updated in Epic as appropriate.     ROS (14): Fourteen point review of system performed and negative except for that mentioned in HPI.     Objective  O     Physical Examination:  Vital Signs: Blood pressure 143/67, pulse 79, temperature 97.5 °F (36.4 °C), temperature source Oral, resp. rate 15, height 152.4 cm (60\"), weight 53 kg (116 lb 13.5 oz), SpO2 97%.    General: The patient appears in no acute distress.  Hemodynamically stable.  Chest: " Clear to auscultation bilaterally, No wheezing, rhonchi, or rales. Normal work of breathing. Equal   chest rise.  Cardiac: Regular rhythm, normal rate, S1S2 auscultated. No murmurs, rubs or gallops.   Abdomen: Soft, non-tender, non-distended, positive bowel sounds in all four quadrants.   Extremities: No lower extremity edema. No clubbing or cyanosis.  Neuro: Drowsy but awakes.  Intermittently speaks in brief sentences.  Moves bilateral lower extremities on commands; strength 3/5.  Minimal strength in bilateral upper extremities.  Pupils reactive.  Protecting airway.    Lines, Drains & Airways       Active LDAs       Name Placement date Placement time Site Days    Peripheral IV 11/28/23 2101 Right Antecubital 11/28/23 2101  Antecubital  less than 1    Peripheral IV 11/28/23 2101 Left Antecubital 11/28/23 2101  Antecubital  less than 1    Peripheral IV 11/28/23 2102 Anterior;Right Forearm 11/28/23 2102  Forearm  less than 1             Results from last 7 days   Lab Units 11/28/23 2103   WBC 10*3/mm3 8.15   HEMOGLOBIN g/dL 12.8   MCV fL 90.4   PLATELETS 10*3/mm3 212         CrCl cannot be calculated (Patient's most recent lab result is older than the maximum 30 days allowed.).  Results from last 7 days   Lab Units 11/28/23 2104   ALT (SGPT) U/L 13   AST (SGOT) U/L 16     Respiratory (ie nasal cannula, HFNC, BiPAP, mechanical ventilation settings) support: Room air     CT perfusion with and without contrast (11/28/2023):   Radiology Impression:   - No evidence of core infarct nor ischemic brain at risk     CTA head/neck (11/28/2023):   Findings:   CTA NECK:   - Aortic arch: No aneurysm. No significant stenosis or occlusion of the major arch vessels.   - Left carotid system: No aneurysm, significant stenosis or occlusion. Minimal atherosclerotic disease of the bulb.   - Right carotid system: No aneurysm, significant stenosis or occlusion. Minimal atherosclerotic disease of the bulb.   - Vertebrobasilar system: The  vertebral arteries arise from their respective subclavian arteries. No aneurysm, significant stenosis or occlusion.     CTA HEAD:   - Anterior circulation: No aneurysm, significant stenosis or occlusion.   - Posterior circulation: No aneurysm, significant stenosis or occlusion.   - Anatomic variants: None of significance.   - Venous sinuses: Patent.     Radiology Impression:   - No hemodynamically significant stenosis, large vessel cut or aneurysms in intracranial circulation   - No hemodynamically significant stenosis, dissection or aneurysms in extracranial circulation.     CT head (11/28/2023):   Radiology Impression:   - No acute process identified.     I reviewed the patient's new laboratory and imaging results.  I independently reviewed the patient's new images.    Assessment & Plan    A / P     -Admit patient to the ICU  -Initiate CVA with TPA protocol  -Aspirin/statin  -Neurology consulted management of CVA per neurology  -Nicardipine for blood pressure control  -Sliding scale insulin as needed for blood sugar control, A1c pending  -PT/OT/ST  -SCDs for DVT prophylaxis  -Pepcid for GI prophylaxis  -AM labs    (+) COVID but no preceding symptoms per family at bedside with the exception of fatigue and malaise.  No known positive contacts though patient has been in and out of the healthcare setting for both herself and with  who was recently hospitalized for cardiac issues at OSH.  No recent fever, chills, night sweats, cough, congestion, dyspnea, chest pain, rash.  Will continue supportive care at this time.  Updated family concerning this finding and will have charge nurse to speak with them about visitation guidelines.    Plan of care and goals reviewed during interdisciplinary rounds.  I discussed the patient's findings and my recommendations with patient, family, nursing staff, and consulting provider    Time: Critical Care time spent in direct patient care: 30 minutes (excluding procedure time, if  applicable) including high complexity decision making to assess, manipulate, and support vital organ system failure in this individual who has impairment of one or more vital organ systems such that there is a high probability of imminent or life threatening deterioration in the patient’s condition.       Electronically signed by Yosef Wright MD at 11/28/23 9266

## 2023-12-02 ENCOUNTER — APPOINTMENT (OUTPATIENT)
Dept: GENERAL RADIOLOGY | Facility: HOSPITAL | Age: 78
DRG: 061 | End: 2023-12-02
Payer: MEDICARE

## 2023-12-02 LAB
BACTERIA UR QL AUTO: NORMAL /HPF
BILIRUB UR QL STRIP: NEGATIVE
CLARITY UR: CLEAR
COLOR UR: YELLOW
GLUCOSE BLDC GLUCOMTR-MCNC: 131 MG/DL (ref 70–130)
GLUCOSE BLDC GLUCOMTR-MCNC: 91 MG/DL (ref 70–130)
GLUCOSE UR STRIP-MCNC: NEGATIVE MG/DL
HGB UR QL STRIP.AUTO: ABNORMAL
HYALINE CASTS UR QL AUTO: NORMAL /LPF
KETONES UR QL STRIP: NEGATIVE
LEUKOCYTE ESTERASE UR QL STRIP.AUTO: NEGATIVE
NITRITE UR QL STRIP: NEGATIVE
PH UR STRIP.AUTO: 5.5 [PH] (ref 5–8)
PROT UR QL STRIP: NEGATIVE
RBC # UR STRIP: NORMAL /HPF
REF LAB TEST METHOD: NORMAL
SP GR UR STRIP: 1.01 (ref 1–1.03)
SQUAMOUS #/AREA URNS HPF: NORMAL /HPF
UROBILINOGEN UR QL STRIP: ABNORMAL
WBC # UR STRIP: NORMAL /HPF

## 2023-12-02 PROCEDURE — 81001 URINALYSIS AUTO W/SCOPE: CPT | Performed by: NURSE PRACTITIONER

## 2023-12-02 PROCEDURE — 82948 REAGENT STRIP/BLOOD GLUCOSE: CPT

## 2023-12-02 PROCEDURE — 74018 RADEX ABDOMEN 1 VIEW: CPT

## 2023-12-02 PROCEDURE — 99232 SBSQ HOSP IP/OBS MODERATE 35: CPT | Performed by: NURSE PRACTITIONER

## 2023-12-02 PROCEDURE — 99233 SBSQ HOSP IP/OBS HIGH 50: CPT | Performed by: HOSPITALIST

## 2023-12-02 RX ORDER — MONTELUKAST SODIUM 10 MG/1
10 TABLET ORAL NIGHTLY
Status: DISCONTINUED | OUTPATIENT
Start: 2023-12-02 | End: 2023-12-03 | Stop reason: HOSPADM

## 2023-12-02 RX ORDER — ATORVASTATIN CALCIUM 40 MG/1
40 TABLET, FILM COATED ORAL NIGHTLY
Status: DISCONTINUED | OUTPATIENT
Start: 2023-12-02 | End: 2023-12-03 | Stop reason: HOSPADM

## 2023-12-02 RX ORDER — POLYETHYLENE GLYCOL 3350 17 G/17G
17 POWDER, FOR SOLUTION ORAL DAILY
Status: DISCONTINUED | OUTPATIENT
Start: 2023-12-02 | End: 2023-12-03 | Stop reason: HOSPADM

## 2023-12-02 RX ORDER — LOSARTAN POTASSIUM 25 MG/1
25 TABLET ORAL
Status: DISCONTINUED | OUTPATIENT
Start: 2023-12-02 | End: 2023-12-03 | Stop reason: HOSPADM

## 2023-12-02 RX ORDER — BISACODYL 5 MG/1
10 TABLET, DELAYED RELEASE ORAL DAILY PRN
Status: DISCONTINUED | OUTPATIENT
Start: 2023-12-02 | End: 2023-12-03 | Stop reason: HOSPADM

## 2023-12-02 RX ADMIN — BISACODYL 10 MG: 5 TABLET, COATED ORAL at 18:20

## 2023-12-02 RX ADMIN — LOSARTAN POTASSIUM 25 MG: 25 TABLET, FILM COATED ORAL at 11:33

## 2023-12-02 RX ADMIN — ASPIRIN 81 MG CHEWABLE TABLET 324 MG: 81 TABLET CHEWABLE at 09:54

## 2023-12-02 RX ADMIN — ATORVASTATIN CALCIUM 40 MG: 40 TABLET, FILM COATED ORAL at 20:06

## 2023-12-02 RX ADMIN — MONTELUKAST 10 MG: 10 TABLET, FILM COATED ORAL at 20:06

## 2023-12-02 RX ADMIN — SENNOSIDES AND DOCUSATE SODIUM 2 TABLET: 8.6; 5 TABLET ORAL at 20:06

## 2023-12-02 RX ADMIN — SENNOSIDES AND DOCUSATE SODIUM 2 TABLET: 8.6; 5 TABLET ORAL at 09:54

## 2023-12-02 RX ADMIN — POLYETHYLENE GLYCOL 3350 17 G: 17 POWDER, FOR SOLUTION ORAL at 11:33

## 2023-12-02 RX ADMIN — LEVOTHYROXINE SODIUM 25 MCG: 0.03 TABLET ORAL at 09:53

## 2023-12-02 NOTE — PROGRESS NOTES
Stroke Progress Note       Chief Complaint: Left-sided weakness, left facial droop    Subjective    Subjective     Subjective: No adverse events overnight.  Patient is sitting up at the recliner, she is a been ambulating around the room with assistance.  Her granddaughter is at the bedside.  Patient is primarily concerned about her constipation, though overall she is feeling better and is anxious to go home      Review of Systems   Constitutional:  Negative for fever.   Respiratory:  Negative for cough and shortness of breath.    Cardiovascular:  Negative for chest pain and palpitations.   Gastrointestinal:  Positive for abdominal pain and constipation.   Neurological:  Negative for facial asymmetry, speech difficulty, weakness, numbness and headaches.            Objective    Objective      Temp:  [97.5 °F (36.4 °C)-98.5 °F (36.9 °C)] 98.4 °F (36.9 °C)  Heart Rate:  [56-73] 60  Resp:  [16-18] 18  BP: (127-147)/(66-87) 137/71        Neurological Exam  Mental Status  Awake and alert. Oriented to person, place and time. Oriented to person, place, and time. no dysarthria present. Language is fluent with no aphasia.    Cranial Nerves  CN II: Right visual acuity: Counts fingers. Left visual acuity: Counts fingers. Right normal visual field. Left normal visual field.  CN III, IV, VI: Extraocular movements intact bilaterally. Normal lids and orbits bilaterally. Pupils equal round and reactive to light bilaterally.  CN V:  Right: Facial sensation is normal.  Left: Facial sensation is normal on the left. Reported patient numbness.  CN VII: Full and symmetric facial movement.  CN VIII: Intact hearing bilateral.  CN IX, X: Unable to assess patient did not follow commands.    Motor  Decreased muscle bulk throughout. No fasciculations present. Normal muscle tone. No abnormal involuntary movements.  Generalized weakness, strength is equal bilaterally upper and lower extremity, no drift.    Sensory  Light touch is normal in upper  and lower extremities.  No right-sided hemispatial neglect. No left-sided hemispatial neglect.    Coordination  Right: Finger-to-nose normal.Left: Finger-to-nose normal.      Physical Exam  Constitutional:       General: She is awake. She is not in acute distress.     Appearance: She is not ill-appearing.      Comments: Patient is alert to herself and age disoriented to time situation and place   HENT:      Head: Normocephalic and atraumatic.      Nose: Nose normal.      Mouth/Throat:      Mouth: Mucous membranes are moist.   Eyes:      General: Lids are normal.      Extraocular Movements: Extraocular movements intact.      Pupils: Pupils are equal, round, and reactive to light.   Cardiovascular:      Rate and Rhythm: Normal rate and regular rhythm.   Pulmonary:      Effort: Pulmonary effort is normal.      Comments: Breathing comfortable on room air  Musculoskeletal:         General: Normal range of motion.      Cervical back: Normal range of motion and neck supple.   Skin:     General: Skin is warm and dry.   Neurological:      General: No focal deficit present.      Mental Status: She is alert and oriented to person, place, and time.      Cranial Nerves: No cranial nerve deficit or dysarthria.      Sensory: No sensory deficit.      Motor: No weakness.   Psychiatric:         Mood and Affect: Mood normal.         Behavior: Behavior normal.         Results Review:    I reviewed the patient's new clinical results.    WBC   Date Value Ref Range Status   12/01/2023 6.71 3.40 - 10.80 10*3/mm3 Final     RBC   Date Value Ref Range Status   12/01/2023 4.10 3.77 - 5.28 10*6/mm3 Final     Hemoglobin   Date Value Ref Range Status   12/01/2023 11.5 (L) 12.0 - 15.9 g/dL Final     Hematocrit   Date Value Ref Range Status   12/01/2023 35.6 34.0 - 46.6 % Final     MCV   Date Value Ref Range Status   12/01/2023 86.8 79.0 - 97.0 fL Final     MCH   Date Value Ref Range Status   12/01/2023 28.0 26.6 - 33.0 pg Final     MCHC   Date  Value Ref Range Status   12/01/2023 32.3 31.5 - 35.7 g/dL Final     RDW   Date Value Ref Range Status   12/01/2023 15.5 (H) 12.3 - 15.4 % Final     RDW-SD   Date Value Ref Range Status   12/01/2023 49.6 37.0 - 54.0 fl Final     MPV   Date Value Ref Range Status   12/01/2023 10.7 6.0 - 12.0 fL Final     Platelets   Date Value Ref Range Status   12/01/2023 186 140 - 450 10*3/mm3 Final     Neutrophil %   Date Value Ref Range Status   12/01/2023 62.3 42.7 - 76.0 % Final     Lymphocyte %   Date Value Ref Range Status   12/01/2023 27.7 19.6 - 45.3 % Final     Monocyte %   Date Value Ref Range Status   12/01/2023 7.9 5.0 - 12.0 % Final     Eosinophil %   Date Value Ref Range Status   12/01/2023 1.8 0.3 - 6.2 % Final     Basophil %   Date Value Ref Range Status   12/01/2023 0.3 0.0 - 1.5 % Final     Immature Grans %   Date Value Ref Range Status   12/01/2023 0.0 0.0 - 0.5 % Final     Neutrophils, Absolute   Date Value Ref Range Status   12/01/2023 4.18 1.70 - 7.00 10*3/mm3 Final     Lymphocytes, Absolute   Date Value Ref Range Status   12/01/2023 1.86 0.70 - 3.10 10*3/mm3 Final     Monocytes, Absolute   Date Value Ref Range Status   12/01/2023 0.53 0.10 - 0.90 10*3/mm3 Final     Eosinophils, Absolute   Date Value Ref Range Status   12/01/2023 0.12 0.00 - 0.40 10*3/mm3 Final     Basophils, Absolute   Date Value Ref Range Status   12/01/2023 0.02 0.00 - 0.20 10*3/mm3 Final     Immature Grans, Absolute   Date Value Ref Range Status   12/01/2023 0.00 0.00 - 0.05 10*3/mm3 Final     nRBC   Date Value Ref Range Status   12/01/2023 0.0 0.0 - 0.2 /100 WBC Final     Lab Results   Component Value Date    GLUCOSE 90 12/01/2023    BUN 14 12/01/2023    CREATININE 0.50 (L) 12/01/2023    EGFR 96.7 12/01/2023    BCR 28.0 (H) 12/01/2023    K 3.8 12/01/2023    CO2 24.0 12/01/2023    CALCIUM 8.6 12/01/2023    ALBUMIN 4.7 05/18/2022    BILITOT 0.7 05/18/2022    AST 16 11/28/2023    ALT 13 11/28/2023       LDL 28, HDL 64    XR Abdomen  KUB    Result Date: 12/2/2023  Impression: The lung bases are excluded from the field-of-view. No evidence of pneumoperitoneum although supine radiographs are insensitive for this finding. No abnormal abdominal calcifications. The bones are demineralized without acute osseous findings. Nonobstructive, nonspecific bowel gas pattern. There is moderate colonic stool burden. Electronically Signed: Marco Somers MD  12/2/2023 12:47 PM EST  Workstation ID: [id number]       Results for orders placed during the hospital encounter of 11/28/23    Adult Transthoracic Echo Complete W/ Cont if Necessary Per Protocol (With Agitated Saline)    Interpretation Summary    Left ventricular systolic function is normal. Left ventricular ejection fraction appears to be 61 - 65%.    Mild aortic valve regurgitation is present.    Mild tricuspid valve regurgitation is present.    No radiology results for the last day        Assessment/Plan     Assessment/Plan:    This is a 80-year-old white female, with significant health diagnosis for hypertension, hyperlipidemia who presented to [facility] ED for left facial droop, dysarthria.  Imaging of CT head, CTA, CTP revealed no acute abnormality.  Patient is status post IV thrombolytic therapy 11/29/2023 at 2200.  Neurology stroke continues to follow-up.      Antiplatelet PTA: Aspirin 81 mg  Anticoagulant PTA: None      Acute AIS aborted by TNK  -Status post TNK 11/29/2023 at 2200  -Stable 24 hr CT with no hemorrhage   -MRI negative for acute abnormality.  -Continue full dose aspirin 325 mg p.o.  daily for stroke prevention.  -Continue NIH/neurochecks per protocol  -Systolic blood pressure goal normotensive 130/80  -Continue PT/OT/SLP. Currently recommending IP rehab   -Patient will need 14 days Holter on discharge, she lives 2 hours away.  If it is not received within a week I have asked her granddaughter to call the office.  -Decrease atorvastatin to 40 mg, (LDL is 28)  -Patient needs to follow-up with  neurology stroke in 3 months, this has been added to the ADT    2. Essential hypertension  -Normal blood pressure goals   -goal <130/80     3. Intermittent headache. Resolved   -CT head 11/30 stable and negative for hemorrhage   -Tylenol 650 as needed for mild pain     4. Asymptomatic COVID infection  -Management by hospitalist       Discussed plan of care with patient, family and Dr. Akers.  No further stroke work up is needed, will sign off and follow up in clinic.  Thank you for letting us participate in patient care. Please call with questions or concerns.       Masha Negrete, PENNY  12/02/23  10:00 EST

## 2023-12-02 NOTE — PROGRESS NOTES
[facility] Medicine Services  PROGRESS NOTE    Patient Name: Munira Escobar  : 1945  MRN: [id number]    Date of Admission: 2023  Primary Care Physician: Eunice Mcwilliams MD    Subjective   Subjective     CC: Stroke    HPI:  Patient was transferred from the ICU overnight.  She was admitted on the  for possible stroke SP tPA with resolved symptoms.  Post tPA imaging were negative.  Blood pressure in 140s this morning.  Patient on olmesartan at home.  Started on losartan 25 mg daily.  Patient did not have bowel movement since before admission.  She is passing gas.  KUB Nonobstructive, nonspecific bowel gas pattern. There is moderate colonic stool burden.  Stool softener started.  Plan was discussed with granddaughter.      Objective   Objective     Vital Signs:   Temp:  [97.5 °F (36.4 °C)-98.5 °F (36.9 °C)] 97.9 °F (36.6 °C)  Heart Rate:  [56-73] 71  Resp:  [16-18] 18  BP: (131-147)/(68-87) 132/72     Physical Exam:  Physical Exam  Vitals and nursing note reviewed.   Constitutional:       Appearance: Normal appearance.   HENT:      Head: Normocephalic and atraumatic.      Mouth/Throat:      Mouth: Mucous membranes are moist.   Eyes:      Pupils: Pupils are equal, round, and reactive to light.   Cardiovascular:      Rate and Rhythm: Normal rate.   Pulmonary:      Effort: Pulmonary effort is normal.      Breath sounds: Normal breath sounds.   Abdominal:      General: There is distension.      Palpations: Abdomen is soft.      Tenderness: There is no abdominal tenderness.   Skin:     General: Skin is warm.   Neurological:      General: No focal deficit present.      Mental Status: She is alert. Mental status is at baseline.   Psychiatric:         Mood and Affect: Mood normal.         Behavior: Behavior normal.           Results Reviewed:  LAB RESULTS:      Lab 23  0323 23  0351 23  0652 23   WBC 6.71 7.37 8.01  --   --   8.15   HEMOGLOBIN 11.5* 11.9* 12.8  --   --  12.8   HEMATOCRIT 35.6 37.5 41.1  --   --  40.6   PLATELETS 186 200 185  --   --  212   NEUTROS ABS 4.18 4.58  --   --   --  6.57   IMMATURE GRANS (ABS) 0.00 0.02  --   --   --  0.04   LYMPHS ABS 1.86 1.94  --   --   --  0.80   MONOS ABS 0.53 0.79  --   --   --  0.72   EOS ABS 0.12 0.01  --   --   --  0.00   MCV 86.8 89.3 89.7  --   --  90.4   CRP  --  3.37*  --   --   --   --    PROCALCITONIN  --  0.04  --   --  0.06  --    APTT  --   --   --  34.7  --   --          Lab 12/01/23  0323 11/30/23  0351 11/29/23  0652 11/28/23 2104   SODIUM 139 141 137  --    POTASSIUM 3.8 3.4* 3.9  --    CHLORIDE 105 103 102  --    CO2 24.0 24.0 23.0  --    ANION GAP 10.0 14.0 12.0  --    BUN 14 15 12  --    CREATININE 0.50* 0.59 0.53*  --    EGFR 96.7 93.0 95.4  --    GLUCOSE 90 90 98  --    CALCIUM 8.6 8.8 9.2  --    MAGNESIUM  --  2.3 2.5*  --    PHOSPHORUS  --  3.1 3.8  --    HEMOGLOBIN A1C  --   --  5.00  --    TSH  --   --   --  0.608         Lab 11/28/23 2104   ALT (SGPT) 13   AST (SGOT) 16         Lab 11/28/23 2104   HSTROP T 10         Lab 11/29/23  0652   CHOLESTEROL 106   LDL CHOL 28   HDL CHOL 64*   TRIGLYCERIDES 67         Lab 11/30/23  0351   FERRITIN 95.74         Brief Urine Lab Results  (Last result in the past 365 days)        Color   Clarity   Blood   Leuk Est   Nitrite   Protein   CREAT   Urine HCG        11/29/23 1756 Yellow   Clear   Trace   Negative   Negative   30 mg/dL (1+)                   Microbiology Results Abnormal       Procedure Component Value - Date/Time    Urine Culture - Urine, Indwelling Urethral Catheter [[id number]]  (Normal) Collected: 11/29/23 1756    Lab Status: Final result Specimen: Urine from Indwelling Urethral Catheter Updated: 11/30/23 2132     Urine Culture No growth            XR Abdomen KUB    Result Date: 12/2/2023  XR ABDOMEN KUB Date of Exam: 12/2/2023 10:24 AM EST Indication: Abdominal pain Comparison: None available. Findings: The  lung bases are excluded from the field-of-view. No evidence of pneumoperitoneum although supine radiographs are insensitive for this finding. No abnormal abdominal calcifications. The bones are demineralized without acute osseous findings. Nonobstructive, nonspecific bowel gas pattern. There is moderate colonic stool burden.     Impression: Impression: The lung bases are excluded from the field-of-view. No evidence of pneumoperitoneum although supine radiographs are insensitive for this finding. No abnormal abdominal calcifications. The bones are demineralized without acute osseous findings. Nonobstructive, nonspecific bowel gas pattern. There is moderate colonic stool burden. Electronically Signed: Marco Somers MD  12/2/2023 12:47 PM EST  Workstation ID: [id number]     Results for orders placed during the hospital encounter of 11/28/23    Adult Transthoracic Echo Complete W/ Cont if Necessary Per Protocol (With Agitated Saline)    Interpretation Summary    Left ventricular systolic function is normal. Left ventricular ejection fraction appears to be 61 - 65%.    Mild aortic valve regurgitation is present.    Mild tricuspid valve regurgitation is present.      Current medications:  Scheduled Meds:aspirin, 324 mg, Oral, Daily  atorvastatin, 40 mg, Oral, Nightly  levothyroxine, 25 mcg, Oral, Daily  losartan, 25 mg, Oral, Q24H  montelukast, 10 mg, Oral, Nightly  polyethylene glycol, 17 g, Oral, Daily  senna-docusate sodium, 2 tablet, Oral, BID      Continuous Infusions:   PRN Meds:.  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        Brief Hospital Course to date:  Munira Escobar is a 78 y.o. female with PMH of hypertension, hyperlipidemia who presented to [facility] ED for left facial droop, dysarthria. Imaging of CT head, CTA, CTP revealed no acute abnormality. Patient is status post IV thrombolytic therapy 11/29/2023 at 2200.  Symptoms resolved.  Repeated imaging negative. Patient was transferred from the ICU on 12/01.   Continue aspirin 325 mg p.o. daily.  Decrease statin to 40 mg.  Follow-up with neurology in 3 months.    Blood pressure in 140s this morning.  Patient on olmesartan at home.  Started on losartan 25 mg daily.  Patient did not have bowel movement since before admission.  She is passing gas.  KUB Nonobstructive, nonspecific bowel gas pattern. There is moderate colonic stool burden.  Stool softener started.  Plan was discussed with granddaughter.  Patient will need cardiac monitoring for 2 weeks after discharge.  Continue aspirin 325 mg p.o. daily.  Decrease statin to 40 mg.  Follow-up with neurology in 3 months.    Patient was tested positive for COVID infection.  Asymptomatic.  On room air.  Monitor.    Expected Discharge Location and Transportation: Home with assist.  Expected Discharge   Expected Discharge Date: 12/4/2023; Expected Discharge Time:      DVT prophylaxis:  Mechanical DVT prophylaxis orders are present.     AM-PAC 6 Clicks Score (PT): 17 (12/01/23 1111)    CODE STATUS:   Code Status and Medical Interventions:   Ordered at: 11/28/23 2222     Code Status (Patient has no pulse and is not breathing):    CPR (Attempt to Resuscitate)     Medical Interventions (Patient has pulse or is breathing):    Full Support       Danna Gutierrez MD  12/02/23

## 2023-12-03 ENCOUNTER — READMISSION MANAGEMENT (OUTPATIENT)
Dept: CALL CENTER | Facility: HOSPITAL | Age: 78
End: 2023-12-03
Payer: MEDICARE

## 2023-12-03 VITALS
DIASTOLIC BLOOD PRESSURE: 82 MMHG | SYSTOLIC BLOOD PRESSURE: 141 MMHG | HEART RATE: 76 BPM | RESPIRATION RATE: 18 BRPM | OXYGEN SATURATION: 93 % | BODY MASS INDEX: 25.49 KG/M2 | HEIGHT: 60 IN | TEMPERATURE: 97.6 F | WEIGHT: 129.85 LBS

## 2023-12-03 LAB — GLUCOSE BLDC GLUCOMTR-MCNC: 95 MG/DL (ref 70–130)

## 2023-12-03 PROCEDURE — 97530 THERAPEUTIC ACTIVITIES: CPT

## 2023-12-03 PROCEDURE — 99239 HOSP IP/OBS DSCHRG MGMT >30: CPT | Performed by: HOSPITALIST

## 2023-12-03 PROCEDURE — 82948 REAGENT STRIP/BLOOD GLUCOSE: CPT

## 2023-12-03 RX ORDER — ENALAPRILAT 1.25 MG/ML
1.25 INJECTION INTRAVENOUS ONCE
Status: COMPLETED | OUTPATIENT
Start: 2023-12-03 | End: 2023-12-03

## 2023-12-03 RX ORDER — ATORVASTATIN CALCIUM 40 MG/1
40 TABLET, FILM COATED ORAL NIGHTLY
Qty: 90 TABLET | Refills: 0 | Status: SHIPPED | OUTPATIENT
Start: 2023-12-03

## 2023-12-03 RX ORDER — ASPIRIN 81 MG/1
324 TABLET, CHEWABLE ORAL DAILY
Qty: 90 TABLET | Refills: 0 | Status: SHIPPED | OUTPATIENT
Start: 2023-12-04

## 2023-12-03 RX ADMIN — LOSARTAN POTASSIUM 25 MG: 25 TABLET, FILM COATED ORAL at 10:21

## 2023-12-03 RX ADMIN — BISACODYL 10 MG: 5 TABLET, COATED ORAL at 00:01

## 2023-12-03 RX ADMIN — SENNOSIDES AND DOCUSATE SODIUM 2 TABLET: 8.6; 5 TABLET ORAL at 10:21

## 2023-12-03 RX ADMIN — ENALAPRILAT 1.25 MG: 2.5 INJECTION INTRAVENOUS at 00:36

## 2023-12-03 RX ADMIN — LEVOTHYROXINE SODIUM 25 MCG: 0.03 TABLET ORAL at 10:22

## 2023-12-03 RX ADMIN — ASPIRIN 81 MG CHEWABLE TABLET 324 MG: 81 TABLET CHEWABLE at 10:22

## 2023-12-03 NOTE — DISCHARGE SUMMARY
[facility] Medicine Services  DISCHARGE SUMMARY    Patient Name: Munira Escobar  : 1945  MRN: [id number]    Date of Admission: 2023  8:52 PM  Date of Discharge:  23  Primary Care Physician: Eunice Mcwilliams MD    Consults       Date and Time Order Name Status Description    2023  9:03 PM Inpatient Neurology Consult Stroke Completed             Hospital Course     Presenting Problem: Stroke like symptoms.    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          Hospital Course:  Munira Escobar is a 78 y.o. female with PMH of hypertension, hyperlipidemia who presented to [facility] ED for left facial droop, dysarthria. Imaging of CT head, CTA, CTP revealed no acute abnormality. Patient is status post IV thrombolytic therapy 2023 at 2200.  Symptoms resolved.  Repeated imaging negative. Patient was transferred from the ICU on . Continue aspirin 325 mg p.o. daily.  Decrease statin to 40 mg. Blood pressure in 140s. Home olmesartan resumed.     Patient was tested positive for COVID infection.  Asymptomatic.  On room air.  Monitor.    Patient was discharged with home health care in a stable condition.      Discharge Follow Up Recommendations for outpatient labs/diagnostics:  Follow up with pcp within 1 week.     Follow-up with neurology in 3 months.    Day of Discharge     HPI:   Seen and examined today.    Review of Systems  No fever, chills, chest pain, shortness of breath or abdominal pain.    Vital Signs:   Temp:  [97.8 °F (36.6 °C)-98.6 °F (37 °C)] 98.4 °F (36.9 °C)  Heart Rate:   [59-77] 63  Resp:  [17-18] 18  BP: (121-152)/(69-92) 140/69      Physical Exam:  Vitals and nursing note reviewed.   Constitutional:       Appearance: Normal appearance.   HENT:      Head: Normocephalic and atraumatic.      Mouth/Throat:      Mouth: Mucous membranes are moist.   Eyes:      Pupils: Pupils are equal, round, and reactive to light.   Cardiovascular:      Rate and Rhythm: Normal rate.   Pulmonary:      Effort: Pulmonary effort is normal.      Breath sounds: Normal breath sounds.   Abdominal:      General: Soft.     Palpations: Abdomen is soft.      Tenderness: There is no abdominal tenderness.   Skin:     General: Skin is warm.   Neurological:      General: No focal deficit present.      Mental Status: She is alert. Mental status is at baseline.   Psychiatric:         Mood and Affect: Mood normal.         Behavior: Behavior normal    Pertinent  and/or Most Recent Results     LAB RESULTS:      Lab 12/01/23 0323 11/30/23 0351 11/29/23 0652 11/28/23 2133 11/28/23 2104 11/28/23 2103   WBC 6.71 7.37 8.01  --   --  8.15   HEMOGLOBIN 11.5* 11.9* 12.8  --   --  12.8   HEMATOCRIT 35.6 37.5 41.1  --   --  40.6   PLATELETS 186 200 185  --   --  212   NEUTROS ABS 4.18 4.58  --   --   --  6.57   IMMATURE GRANS (ABS) 0.00 0.02  --   --   --  0.04   LYMPHS ABS 1.86 1.94  --   --   --  0.80   MONOS ABS 0.53 0.79  --   --   --  0.72   EOS ABS 0.12 0.01  --   --   --  0.00   MCV 86.8 89.3 89.7  --   --  90.4   CRP  --  3.37*  --   --   --   --    PROCALCITONIN  --  0.04  --   --  0.06  --    APTT  --   --   --  34.7  --   --          Lab 12/01/23 0323 11/30/23 0351 11/29/23 0652 11/28/23 2104   SODIUM 139 141 137  --    POTASSIUM 3.8 3.4* 3.9  --    CHLORIDE 105 103 102  --    CO2 24.0 24.0 23.0  --    ANION GAP 10.0 14.0 12.0  --    BUN 14 15 12  --    CREATININE 0.50* 0.59 0.53*  --    EGFR 96.7 93.0 95.4  --    GLUCOSE 90 90 98  --    CALCIUM 8.6 8.8 9.2  --    MAGNESIUM  --  2.3 2.5*  --    PHOSPHORUS  --   3.1 3.8  --    HEMOGLOBIN A1C  --   --  5.00  --    TSH  --   --   --  0.608         Lab 11/28/23 2104   ALT (SGPT) 13   AST (SGOT) 16         Lab 11/28/23 2104   HSTROP T 10         Lab 11/29/23  0652   CHOLESTEROL 106   LDL CHOL 28   HDL CHOL 64*   TRIGLYCERIDES 67         Lab 11/30/23  0351   FERRITIN 95.74         Brief Urine Lab Results  (Last result in the past 365 days)        Color   Clarity   Blood   Leuk Est   Nitrite   Protein   CREAT   Urine HCG        12/02/23 1553 Yellow   Clear   Small (1+)   Negative   Negative   Negative                 Microbiology Results (last 10 days)       Procedure Component Value - Date/Time    Urine Culture - Urine, Indwelling Urethral Catheter [[id number]]  (Normal) Collected: 11/29/23 1756    Lab Status: Final result Specimen: Urine from Indwelling Urethral Catheter Updated: 11/30/23 2132     Urine Culture No growth    COVID PRE-OP / PRE-PROCEDURE SCREENING ORDER (NO ISOLATION) - Swab, Nasopharynx [[id number]]  (Abnormal) Collected: 11/28/23 2223    Lab Status: Final result Specimen: Swab from Nasopharynx Updated: 11/28/23 2333    Narrative:      The following orders were created for panel order COVID PRE-OP / PRE-PROCEDURE SCREENING ORDER (NO ISOLATION) - Swab, Nasopharynx.  Procedure                               Abnormality         Status                     ---------                               -----------         ------                     COVID-19 and FLU A/B PCR...[[id number]]  Abnormal            Final result                 Please view results for these tests on the individual orders.    COVID-19 and FLU A/B PCR, 1 HR TAT - Swab, Nasopharynx [[id number]]  (Abnormal) Collected: 11/28/23 2223    Lab Status: Final result Specimen: Swab from Nasopharynx Updated: 11/28/23 2333     COVID19 Detected     Influenza A PCR Not Detected     Influenza B PCR Not Detected    Narrative:      Fact sheet for providers: https://www.fda.gov/media/142690/download    Fact sheet for  patients: https://www.fda.gov/media/027903/download    Test performed by PCR.  Influenza A and Influenza B negative results should be considered presumptive in samples that have a positive SARS-CoV-2 result.    Competitive inhibition studies showed that SARS-CoV-2 virus, when present at concentrations above 3.6E+04 copies/mL, can inhibit the detection and amplification of influenza A and influenza B virus RNA if present at or below 1.8E+02 copies/mL or 4.9E+02 copies/mL, respectively, and may lead to false negative influenza virus results. If co-infection with influenza A or influenza B virus is suspected in samples with a positive SARS-CoV-2 result, the sample should be re-tested with another FDA cleared, approved, or authorized influenza test, if influenza virus detection would change clinical management.            XR Abdomen KUB    Result Date: 12/2/2023  XR ABDOMEN KUB Date of Exam: 12/2/2023 10:24 AM EST Indication: Abdominal pain Comparison: None available. Findings: The lung bases are excluded from the field-of-view. No evidence of pneumoperitoneum although supine radiographs are insensitive for this finding. No abnormal abdominal calcifications. The bones are demineralized without acute osseous findings. Nonobstructive, nonspecific bowel gas pattern. There is moderate colonic stool burden.     Impression: The lung bases are excluded from the field-of-view. No evidence of pneumoperitoneum although supine radiographs are insensitive for this finding. No abnormal abdominal calcifications. The bones are demineralized without acute osseous findings. Nonobstructive, nonspecific bowel gas pattern. There is moderate colonic stool burden. Electronically Signed: Marco Somers MD  12/2/2023 12:47 PM EST  Workstation ID: [id number]    CT Chest Without Contrast Diagnostic    Result Date: 11/30/2023  CT CHEST WO CONTRAST DIAGNOSTIC Date of Exam: 11/29/2023 11:08 PM EST Indication: Respiratory illness, nondiagnostic xray  (+) Covid-19 Test. Comparison: 11/28/2023. Technique: Axial CT images were obtained of the chest without contrast administration.  Reconstructed coronal and sagittal images were also obtained. Automated exposure control and iterative construction methods were used. Findings: Hilum and Mediastinum: No pathologically enlarged lymph nodes.  Normal heart size.   No pericardial effusion.  Unremarkable thoracic aorta and pulmonary arteries. Atherosclerotic calcifications are present including within the coronary arteries. Lung Parenchyma and Pleura: Mild patchy airspace changes present within the lung bases bilaterally, likely related to atelectasis. No significant air bronchograms identified. No dominant consolidation..  No suspicious pulmonary nodules.  No endobronchial  lesions.  No significant pleural effusions. Upper Abdomen: No acute process. Soft tissues: Unremarkable. Osseous structures: No aggressive focal lytic or sclerotic osseous lesions.     Impression: 1.Mild patchy airspace changes present within the lung bases bilaterally, likely related to atelectasis. No significant air bronchograms identified. No dominant consolidation.. 2.Ancillary findings as described above. Electronically Signed: Sarah Marin MD  11/30/2023 2:31 AM EST  Workstation ID: [id number]    CT Head Without Contrast    Result Date: 11/30/2023  CT HEAD WO CONTRAST Date of Exam: 11/29/2023 11:08 PM EST Indication: Stroke, follow up 24 Hours Post Thrombolytic Administration. Comparison: 11/29/2023. Technique: Axial CT images were obtained of the head without contrast administration.  Automated exposure control and iterative construction methods were used. Findings: There is no evidence of hemorrhage. There is no mass effect or midline shift. There is no extracerebral collection. Ventricles are normal in size and configuration for patient's stated age.  Posterior fossa is within normal limits. Calvarium and skull base appear intact.   Visualized  sinuses show no air fluid levels. Visualized orbits are unremarkable.     Impression: No acute intracranial process identified. Electronically Signed: Sarah Marin MD  11/30/2023 2:30 AM EST  Workstation ID: [id number]    Adult Transthoracic Echo Complete W/ Cont if Necessary Per Protocol (With Agitated Saline)    Result Date: 11/29/2023    Left ventricular systolic function is normal. Left ventricular ejection fraction appears to be 61 - 65%.   Mild aortic valve regurgitation is present.   Mild tricuspid valve regurgitation is present.     EEG    Result Date: 11/29/2023  Reason for referral: 78 y.o.female with altered mental status, seizure-like activity Technical Summary:  A 19 channel digital EEG was performed using the international 10-20 placement system, including eye leads and EKG leads. Duration: 21 minutes Findings: The patient is drowsy.  The background shows diffuse medium amplitude theta which is present symmetrically over both hemispheres.  Faster theta frequencies are less full seen over the right hemisphere than the left.  Frequent bursts of medium amplitude 2-3 Hz generalized delta are seen.  Frequent generalized but frontal dominant triphasic sharp waves are noted.  Photic stimulation does not change the background.  Hyperventilation is not performed.  No electrographic seizures are seen. Video: Available Technical quality: Superior EKG: Regular, 60 to 70 bpm SUMMARY: Moderate generalized slow Mild right hemispheric suppression Frequent generalized frontal dominant triphasic sharp waves     Diffuse cerebral dysfunction of moderate degree, affecting the right hemisphere more so The triphasic waves noted above are abnormal but nonspecific.  These are most commonly seen due to toxic/metabolic or anoxic cause, although they may also be seen in the setting of seizure disorders This report is transcribed using the Dragon dictation system.      CT Head Without Contrast    Result Date: 11/29/2023  CT HEAD WO  CONTRAST Date of Exam: 11/29/2023 5:20 AM EST Indication: Stroke, follow up. CT and MRI brain dated 11/29/2023 Comparison: None available. Technique: Axial CT images were obtained of the head without contrast administration.  Automated exposure control and iterative construction methods were used. Findings: There is no evidence of hemorrhage. There is no mass effect or midline shift. Periventricular hypodense areas compatible with chronic vessel ischemia. Mild diffuse brain atrophy There is no extracerebral collection. Ventricles are normal in size and configuration for patient's stated age. Posterior fossa is within normal limits. Calvarium and skull base appear intact.  Visualized sinuses show no air fluid levels. Visualized orbits are unremarkable.     Impression: No acute intracranial abnormality Electronically Signed: Lobo Arteaga MD  11/29/2023 8:11 AM EST  Workstation ID: [id number]    MRI Brain Without Contrast    Result Date: 11/29/2023  MRI BRAIN WO CONTRAST Date of Exam: 11/29/2023 1:03 AM EST Indication: Stroke, follow up.  Comparison: 11/29/2023. Technique:  Routine multiplanar/multisequence sequence images of the brain were obtained without contrast administration. Findings: There is no diffusion restriction to suggest acute infarct. There is no evidence of acute or chronic intracranial hemorrhage. No mass effect or midline shift. No abnormal extra-axial collections. Moderate periventricular and subcortical FLAIR signal changes are present. The major vascular flow voids appear intact. The basal ganglia, brainstem and cerebellum appear within normal limits. Calvarial and superficial soft tissue signal is within normal limits. Orbits appear unremarkable. The paranasal sinuses and the mastoid air cells appear well aerated. Midline structures are intact.     Impression: 1.No evidence of hemorrhage, mass effect or midline shift. No evidence of recent or acute ischemia. 2.Moderate periventricular and  subcortical FLAIR signal changes likely related to chronic microvascular ischemic change. Electronically Signed: Sarah Marin MD  11/29/2023 1:08 AM EST  Workstation ID: [id number]    CT Head Without Contrast Stroke Protocol    Result Date: 11/29/2023  CT HEAD WO CONTRAST STROKE PROTOCOL Date of Exam: 11/29/2023 12:06 AM EST Indication: Neuro deficit, acute, stroke suspected. Not a true code stroke. Stroke follow-up Comparison: 11/20/2023. Technique: Axial CT images were obtained of the head without contrast administration.  Reconstructed coronal images were also obtained. Automated exposure control and iterative construction methods were used. Findings: There is no evidence of hemorrhage. There is no mass effect or midline shift. There is no extracerebral collection. Ventricles are normal in size and configuration for patient's stated age.  Posterior fossa is within normal limits. Calvarium and skull base appear intact.   Visualized sinuses show no air fluid levels. Visualized orbits are unremarkable.     Impression: No acute intracranial process identified. Electronically Signed: Sarah Marin MD  11/29/2023 12:23 AM EST  Workstation ID: [id number]    XR Chest 1 View    Result Date: 11/28/2023  XR CHEST 1 VW Date of Exam: 11/28/2023 9:20 PM EST Indication: Acute Stroke Protocol (onset < 12 hrs) Comparison: None available. Findings: There is density in the left lung base which obscures the left costophrenic angle. The lungs otherwise are clear. No pneumothorax. Small left pleural effusion is suspected. A definitive right pleural effusion is not seen. Cardiac, hilar, and mediastinal silhouettes are within normal limits. No pneumothorax. The trachea is midline. Pulmonary vascularity is normal. Visualized bony structures are intact.     Impression: Density in the left lung base which may represent a combination of atelectasis, pneumonia and/or small pleural effusion. Electronically Signed: Leopoldo Flood DO  11/28/2023  9:38 PM EST  Workstation ID: [id number]    CT Angiogram Head w AI Analysis of LVO    Result Date: 11/28/2023  CT ANGIOGRAM HEAD W AI ANALYSIS OF LVO, CT ANGIOGRAM NECK Date of Exam: 11/28/2023 8:01 PM CST Indication: Stroke, follow up. Comparison: None available. Technique: CTA of the head was performed after the uneventful intravenous administration of 115 cc Isovue-370. Reconstructed coronal and sagittal images were also obtained. In addition, a 3-D volume rendered image was created for interpretation. Automated exposure control and iterative reconstruction methods were used. Findings: CTA NECK: *Aortic arch: No aneurysm. No significant stenosis or occlusion of the major arch vessels. *Left carotid system: No aneurysm, significant stenosis or occlusion. Minimal atherosclerotic disease of the bulb. *Right carotid system: No aneurysm, significant stenosis or occlusion. Minimal atherosclerotic disease of the bulb. *Vertebrobasilar system: The vertebral arteries arise from their respective subclavian arteries. No aneurysm, significant stenosis or occlusion. CTA HEAD: *Anterior circulation: No aneurysm, significant stenosis or occlusion. *Posterior circulation: No aneurysm, significant stenosis or occlusion. *Anatomic variants: None of significance. *Venous sinuses: Patent.     1.No hemodynamically significant stenosis, large vessel cut or aneurysms in intracranial circulation 2.No hemodynamically significant stenosis, dissection or aneurysms in extracranial circulation. Electronically Signed: Ronald Barragan MD  11/28/2023 8:24 PM CST  Workstation ID: [id number]    CT Angiogram Neck    Result Date: 11/28/2023  CT ANGIOGRAM HEAD W AI ANALYSIS OF LVO, CT ANGIOGRAM NECK Date of Exam: 11/28/2023 8:01 PM CST Indication: Stroke, follow up. Comparison: None available. Technique: CTA of the head was performed after the uneventful intravenous administration of 115 cc Isovue-370. Reconstructed coronal and sagittal images were also  obtained. In addition, a 3-D volume rendered image was created for interpretation. Automated exposure control and iterative reconstruction methods were used. Findings: CTA NECK: *Aortic arch: No aneurysm. No significant stenosis or occlusion of the major arch vessels. *Left carotid system: No aneurysm, significant stenosis or occlusion. Minimal atherosclerotic disease of the bulb. *Right carotid system: No aneurysm, significant stenosis or occlusion. Minimal atherosclerotic disease of the bulb. *Vertebrobasilar system: The vertebral arteries arise from their respective subclavian arteries. No aneurysm, significant stenosis or occlusion. CTA HEAD: *Anterior circulation: No aneurysm, significant stenosis or occlusion. *Posterior circulation: No aneurysm, significant stenosis or occlusion. *Anatomic variants: None of significance. *Venous sinuses: Patent.     1.No hemodynamically significant stenosis, large vessel cut or aneurysms in intracranial circulation 2.No hemodynamically significant stenosis, dissection or aneurysms in extracranial circulation. Electronically Signed: Ronald Barragan MD  11/28/2023 8:24 PM CST  Workstation ID: [id number]    CT CEREBRAL PERFUSION WITH & WITHOUT CONTRAST    Result Date: 11/28/2023  CT CEREBRAL PERFUSION W WO CONTRAST Date of Exam: 11/28/2023 8:01 PM CST Indication: Neuro deficit, acute, stroke suspected.  Comparison: None available. Technique: Axial CT images of the brain were obtained prior to and after the administration of 115 cc Isovue-370. Core blood volume, core blood flow, mean transit time, and Tmax images were obtained utilizing the Rapid software protocol. A limited CT angiogram of the head was also performed to measure the blood vessel density. The radiation dose reduction device was turned on for each scan per the ALARA (As Low as Reasonably Achievable) protocol. Findings:  CBF<30% volume: 0 mL Tmax>6sec volume: 5 mL Mismatch volume: 5 mL Mismatch ratio: Infinite There is  an artifactual area of elevated Tmax along the anterior cranial fossa.         1. No evidence of core infarct nor ischemic brain at risk Electronically Signed: Ronald Barragan MD  11/28/2023 8:20 PM CST  Workstation ID: [id number]    CT Head Without Contrast Stroke Protocol    Result Date: 11/28/2023  CT HEAD WO CONTRAST STROKE PROTOCOL Date of Exam: 11/28/2023 7:58 PM CST Indication: Neuro deficit, acute, stroke suspected. Comparison: None available. Technique: Axial CT images were obtained of the head without contrast administration.  Reconstructed coronal images were also obtained. Automated exposure control and iterative construction methods were used. Scan Time: 20:52 Results discussed with stroke navigator at 21:12. Findings: There is no evidence of acute territorial infarction. There is no acute intracranial hemorrhage. There are no extra-axial collections. Ventricles and CSF spaces are symmetric. No mass effect nor hydrocephalus. Brain parenchyma appears normal for age.  Paranasal sinuses and mastoid air cells are adequately aerated.  Osseous structures and orbits appear intact.     Impression: No acute process identified. Electronically Signed: Ronald Barragan MD  11/28/2023 8:15 PM CST  Workstation ID: [id number]     Results for orders placed in visit on 06/14/16    SCANNED VASCULAR STUDIES      Results for orders placed in visit on 06/14/16    SCANNED VASCULAR STUDIES      Results for orders placed during the hospital encounter of 11/28/23    Adult Transthoracic Echo Complete W/ Cont if Necessary Per Protocol (With Agitated Saline)    Interpretation Summary    Left ventricular systolic function is normal. Left ventricular ejection fraction appears to be 61 - 65%.    Mild aortic valve regurgitation is present.    Mild tricuspid valve regurgitation is present.      Plan for Follow-up of Pending Labs/Results:     Discharge Details        Discharge Medications        New Medications        Instructions Start Date    aspirin 81 MG chewable tablet  Replaces: aspirin 81 MG EC tablet   324 mg, Oral, Daily   Start Date: December 4, 2023            Changes to Medications        Instructions Start Date   atorvastatin 40 MG tablet  Commonly known as: LIPITOR  What changed:   medication strength  how much to take  when to take this   40 mg, Oral, Nightly             Continue These Medications        Instructions Start Date   cholecalciferol 25 MCG (1000 UT) tablet  Commonly known as: VITAMIN D3   1,000 Units, Oral, Daily      folic acid 1 MG tablet  Commonly known as: FOLVITE   1 mg, Oral, Daily      lansoprazole 30 MG capsule  Commonly known as: PREVACID   1 capsule, Oral, Daily      levothyroxine 25 MCG tablet  Commonly known as: SYNTHROID, LEVOTHROID   25 mcg, Oral, Daily      loratadine 10 MG tablet  Commonly known as: CLARITIN   10 mg, Oral, Daily      meclizine 12.5 MG tablet  Commonly known as: ANTIVERT   As Needed      montelukast 10 MG tablet  Commonly known as: SINGULAIR   1 tablet, Oral, Nightly      olmesartan 20 MG tablet  Commonly known as: Benicar   20 mg, Oral, Daily      potassium chloride 10 MEQ CR tablet   1 tablet, Oral, Daily             Stop These Medications      aspirin 81 MG EC tablet  Replaced by: aspirin 81 MG chewable tablet              Allergies   Allergen Reactions    Morphine And Related Other (See Comments)     Extreme tiredness     Penicillins Swelling     Tongue and lips     Valsartan Headache    Sulfa Antibiotics GI Intolerance         Discharge Disposition:  Home or Self Care    Diet:  Hospital:  Diet Order   Procedures    Diet: Cardiac Diets; Healthy Heart (2-3 Na+); Texture: Soft to Chew (NDD 3); Soft to Chew: Chopped Meat; Fluid Consistency: Thin (IDDSI 0)            Activity: As tolerated         CODE STATUS:    Code Status and Medical Interventions:   Ordered at: 11/28/23 2221     Code Status (Patient has no pulse and is not breathing):    CPR (Attempt to Resuscitate)     Medical Interventions  (Patient has pulse or is breathing):    Full Support       Future Appointments   Date Time Provider Department Center   12/11/2023  1:00 PM Michi Gamez APRN [facility]       Additional Instructions for the Follow-ups that You Need to Schedule       Ambulatory Referral to Home Health   As directed      Face to Face Visit Date: 12/1/2023   Follow-up provider for Plan of Care?: I treated the patient in an acute care facility and will not continue treatment after discharge.   Follow-up provider: REECE ARIZA [[id number]]   Reason/Clinical Findings: [facility]   Describe mobility limitations that make leaving home difficult: impaired functional mobility, balance, gait and endurance   Nursing/Therapeutic Services Requested: Skilled Nursing Physical Therapy Occupational Therapy Speech Therapy   Skilled nursing orders: Cardiopulmonary assessments Neurovascular assessments   PT orders: Therapeutic exercise Gait Training Transfer training Strengthening Home safety assessment   Weight Bearing Status: As Tolerated   Occupational orders: Activities of daily living Energy conservation Strengthening Home safety assessment   SLP orders: Dysphagia therapy   Frequency: 1 Week 1                      Danna Gutierrez MD  12/03/23      Time Spent on Discharge:  I spent  35  minutes on this discharge activity which included: face-to-face encounter with the patient, reviewing the data in the system, coordination of the care with the nursing staff as well as consultants, documentation, and entering orders.          normal bilaterally

## 2023-12-03 NOTE — CASE MANAGEMENT/SOCIAL WORK
Case Management Discharge Note      Final Note: Home today with family and [facility] . CM will fax d/c summary once done to [facility] . Patient has a RW in her room that belongs to her. I talked with dtr in the room and she is hoping that patient is able to go home         Selected Continued Care - Admitted Since 11/28/2023       Destination    No services have been selected for the patient.                Durable Medical Equipment       Service Provider Selected Services Address Phone Fax Patient Preferred    [facility] [address] [place] [address] [address] [phone] --              Dialysis/Infusion    No services have been selected for the patient.                Home Medical Care       Service Provider Selected Services Address Phone Fax Patient Preferred    [facility] [address] [place] [address] [address] [address] [phone] --              Therapy    No services have been selected for the patient.                Community Resources    No services have been selected for the patient.                Community & DME    No services have been selected for the patient.                         Final Discharge Disposition Code: 06 - home with home health care

## 2023-12-03 NOTE — OUTREACH NOTE
Prep Survey      Flowsheet Row Responses   [facility]? [facility]   Is LACE score < 7 ? No   Eligibility Readm Mgmt   Discharge diagnosis Acute CVA   Does the patient have one of the following disease processes/diagnoses(primary or secondary)? Stroke   Does the patient have Home health ordered? Yes   What is the Home health agency?  [facility]   Is there a DME ordered? Yes   What DME was ordered? rolling walker from [facility]   Prep survey completed? Yes            Brenda ROBERTO - Registered Nurse

## 2023-12-03 NOTE — THERAPY TREATMENT NOTE
Patient Name: Munira Escobar  : 1945    MRN: [id number]                              Today's Date: 12/3/2023       Admit Date: 2023    Visit Dx:     ICD-10-CM ICD-9-CM   1. Acute CVA (cerebrovascular accident)  I63.9 434.91   2. Dysphagia, unspecified type  R13.10 787.20   3. Cognitive communication deficit  R41.841 799.52   4. 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 Go)     HYSTERECTOMY        General Information       Row Name 23 1404          Physical Therapy Time and Intention    Document Type therapy note (daily note)  -AE     Mode of Treatment physical therapy  -AE       Row Name 23 1404          General Information    Patient Profile Reviewed yes  -AE     Existing Precautions/Restrictions fall  -AE     Barriers to Rehab medically complex;cognitive status  -AE       Row Name 23 1404          Cognition    Orientation Status (Cognition) oriented to;person;time;verbal cues/prompts needed for orientation  -AE       Row Name 23 1404          Safety Issues, Functional Mobility    Safety Issues Affecting Function  (Mobility) awareness of need for assistance;insight into deficits/self-awareness;safety precaution awareness;safety precautions follow-through/compliance;sequencing abilities  -AE     Impairments Affecting Function (Mobility) balance;cognition;coordination;endurance/activity tolerance;strength  -AE     Cognitive Impairments, Mobility Safety/Performance awareness, need for assistance;insight into deficits/self-awareness;safety precaution awareness;sequencing abilities;problem-solving/reasoning  -AE               User Key  (r) = Recorded By, (t) = Taken By, (c) = Cosigned By      Initials Name Provider Type    AE Loki Montana, PT Physical Therapist                   Mobility       Row Name 12/03/23 1405          Bed Mobility    Bed Mobility supine-sit  -AE     Supine-Sit Petty (Bed Mobility) standby assist  -AE     Assistive Device (Bed Mobility) bed rails;head of bed elevated  -AE     Comment, (Bed Mobility) Pt demo good use of bed rails to assist with supine to sit. Required no manual assist to sit EOB.  -AE       Row Name 12/03/23 1405          Transfers    Comment, (Transfers) VCs for hand placement and sequencing. Pt required increased cues to improve sequencing and positioning of RW to reduce risk of falling.  -AE       Row Name 12/03/23 1405          Sit-Stand Transfer    Sit-Stand Petty (Transfers) contact guard;1 person assist;verbal cues  -AE     Assistive Device (Sit-Stand Transfers) walker, front-wheeled  -AE       Row Name 12/03/23 1407          Gait/Stairs (Locomotion)    Petty Level (Gait) contact guard;1 person assist;verbal cues  -AE     Assistive Device (Gait) walker, front-wheeled  -AE     Distance in Feet (Gait) 60  -AE     Deviations/Abnormal Patterns (Gait) base of support, narrow;karlee decreased;gait speed decreased;stride length decreased;bilateral deviations  -AE     Bilateral Gait Deviations forward flexed posture  -AE     Comment, (Gait/Stairs) Pt demo step through  gait pattern with slowed karlee, decreased step length, and narrow FABRICIO. Pt demo improved upright posture while ambulating this session but required increased cues to improve sequencing of AD to reduce risk of falling. Further distance limited by fatigue.  -AE               User Key  (r) = Recorded By, (t) = Taken By, (c) = Cosigned By      Initials Name Provider Type    Loki Meyer PT Physical Therapist                   Obj/Interventions       Row Name 12/03/23 1418          Balance    Balance Assessment sitting static balance;sitting dynamic balance;sit to stand dynamic balance;standing static balance;standing dynamic balance  -AE     Static Sitting Balance contact guard;1-person assist;verbal cues  -AE     Dynamic Sitting Balance contact guard  -AE     Position, Sitting Balance sitting in chair;unsupported  -AE     Sit to Stand Dynamic Balance contact guard;1-person assist;verbal cues  -AE     Static Standing Balance contact guard;1-person assist;verbal cues  -AE     Dynamic Standing Balance contact guard;1-person assist;verbal cues  -AE     Position/Device Used, Standing Balance supported;walker, front-wheeled  -AE               User Key  (r) = Recorded By, (t) = Taken By, (c) = Cosigned By      Initials Name Provider Type    Loki Meyer PT Physical Therapist                   Goals/Plan    No documentation.                  Clinical Impression       Row Name 12/03/23 1420          Pain    Pretreatment Pain Rating 0/10 - no pain  -AE     Posttreatment Pain Rating 0/10 - no pain  -AE       Row Name 12/03/23 1420          Plan of Care Review    Plan of Care Reviewed With patient  -AE     Progress improving  -AE     Outcome Evaluation Pt continues to present with decreased functional mobility and decreased independence. Pt ambulated 60ft in room with CGA and RW. Pt demo improved overall mobility but continues to require assist at times. Recommend D/C home with assist and HHPT.  -AE       Row Name  12/03/23 1420          Vital Signs    O2 Delivery Pre Treatment room air  -AE     O2 Delivery Intra Treatment room air  -AE     O2 Delivery Post Treatment room air  -AE     Pre Patient Position Supine  -AE     Intra Patient Position Standing  -AE     Post Patient Position Sitting  -AE       Row Name 12/03/23 1420          Positioning and Restraints    Pre-Treatment Position in bed  -AE     Post Treatment Position chair  -AE     In Chair notified nsg;sitting;call light within reach;encouraged to call for assist;with family/caregiver;legs elevated  exit alarm unchanged  -AE               User Key  (r) = Recorded By, (t) = Taken By, (c) = Cosigned By      Initials Name Provider Type    AE Loki Montana, PT Physical Therapist                   Outcome Measures       Row Name 12/03/23 1425 12/03/23 1200       How much help from another person do you currently need...    Turning from your back to your side while in flat bed without using bedrails? 3  -AE 3  -BL    Moving from lying on back to sitting on the side of a flat bed without bedrails? 3  -AE 3  -BL    Moving to and from a bed to a chair (including a wheelchair)? 3  -AE 3  -BL    Standing up from a chair using your arms (e.g., wheelchair, bedside chair)? 3  -AE 3  -BL    Climbing 3-5 steps with a railing? 3  -AE 2  -BL    To walk in hospital room? 3  -AE 3  -BL    AM-PAC 6 Clicks Score (PT) 18  -AE 17  -BL    Highest Level of Mobility Goal 6 --> Walk 10 steps or more  -AE 5 --> Static standing  -BL      Row Name 12/03/23 1000 12/03/23 0800       How much help from another person do you currently need...    Turning from your back to your side while in flat bed without using bedrails? 3  -BL 3  -BL    Moving from lying on back to sitting on the side of a flat bed without bedrails? 3  -BL 3  -BL    Moving to and from a bed to a chair (including a wheelchair)? 3  -BL 3  -BL    Standing up from a chair using your arms (e.g., wheelchair, bedside chair)? 3  -BL 3  -BL     Climbing 3-5 steps with a railing? 2  -BL 2  -BL    To walk in hospital room? 3  -BL 3  -BL    AM-PAC 6 Clicks Score (PT) 17  -BL 17  -BL    Highest Level of Mobility Goal 5 --> Static standing  -BL 5 --> Static standing  -BL      Row Name 12/03/23 1425          Functional Assessment    Outcome Measure Options AM-PAC 6 Clicks Basic Mobility (PT)  -AE               User Key  (r) = Recorded By, (t) = Taken By, (c) = Cosigned By      Initials Name Provider Type    AE Loki Montana, PT Physical Therapist    Js Louie, RN Registered Nurse                                 Physical Therapy Education       Title: PT OT SLP Therapies (In Progress)       Topic: Physical Therapy (In Progress)       Point: Mobility training (In Progress)       Learning Progress Summary             Patient Acceptance, E, NR by AE at 12/3/2023 1133    Acceptance, E,D, NR by MB at 11/29/2023 1431   Family Acceptance, E,D, NR by MB at 11/29/2023 1431                         Point: Home exercise program (In Progress)       Learning Progress Summary             Patient Acceptance, E, NR by AE at 12/3/2023 1133    Acceptance, E,D, NR by MB at 11/29/2023 1431   Family Acceptance, E,D, NR by MB at 11/29/2023 1431                         Point: Body mechanics (In Progress)       Learning Progress Summary             Patient Acceptance, E, NR by AE at 12/3/2023 1133    Acceptance, E,D, NR by MB at 11/29/2023 1431   Family Acceptance, E,D, NR by MB at 11/29/2023 1431                         Point: Precautions (In Progress)       Learning Progress Summary             Patient Acceptance, E, NR by AE at 12/3/2023 1133    Acceptance, E,D, NR by MB at 11/29/2023 1431   Family Acceptance, E,D, NR by MB at 11/29/2023 1431                                         User Key       Initials Effective Dates Name Provider Type Discipline    MB 06/16/21 -  Camilla Sosa, PT Physical Therapist PT    AE 09/21/21 -  Loki Montana PT Physical Therapist  PT                  PT Recommendation and Plan     Plan of Care Reviewed With: patient  Progress: improving  Outcome Evaluation: Pt continues to present with decreased functional mobility and decreased independence. Pt ambulated 60ft in room with CGA and RW. Pt demo improved overall mobility but continues to require assist at times. Recommend D/C home with assist and HHPT.     Time Calculation:         PT Charges       Row Name 12/03/23 1433             Time Calculation    Start Time 1133  -AE      PT Received On 12/03/23  -AE      PT Goal Re-Cert Due Date 12/09/23  -AE         Timed Charges    40628 - PT Therapeutic Activity Minutes 24  -AE         Total Minutes    Timed Charges Total Minutes 24  -AE       Total Minutes 24  -AE                User Key  (r) = Recorded By, (t) = Taken By, (c) = Cosigned By      Initials Name Provider Type    AE Loki Montana PT Physical Therapist                  Therapy Charges for Today       Code Description Service Date Service Provider Modifiers Qty    56207092422  PT THERAPEUTIC ACT EA 15 MIN 12/3/2023 Loki Montana, PT GP 2            PT G-Codes  Outcome Measure Options: AM-PAC 6 Clicks Basic Mobility (PT)  AM-PAC 6 Clicks Score (PT): 18  AM-PAC 6 Clicks Score (OT): 9  Modified Rochester Scale: 4 - Moderately severe disability.  Unable to walk without assistance, and unable to attend to own bodily needs without assistance.  PT Discharge Summary  Anticipated Discharge Disposition (PT): home with assist, home with home health    Loki Montana PT  12/3/2023

## 2023-12-03 NOTE — PLAN OF CARE
Goal Outcome Evaluation:  Plan of Care Reviewed With: patient        Progress: improving  Outcome Evaluation: Pt continues to present with decreased functional mobility and decreased independence. Pt ambulated 60ft in room with CGA and RW. Pt demo improved overall mobility but continues to require assist at times. Recommend D/C home with assist and HHPT.      Anticipated Discharge Disposition (PT): home with assist, home with home health

## 2023-12-04 ENCOUNTER — READMISSION MANAGEMENT (OUTPATIENT)
Dept: CALL CENTER | Facility: HOSPITAL | Age: 78
End: 2023-12-04
Payer: MEDICARE

## 2023-12-04 NOTE — OUTREACH NOTE
Stroke Week 1 Survey      Flowsheet Row Responses   [facility] patient discharged from? [place]   Does the patient have one of the following disease processes/diagnoses(primary or secondary)? Stroke   Week 1 attempt successful? Yes   Call start time 0923   Call end time 0928   Discharge diagnosis Acute CVA   Person spoke with today (if not patient) and relationship Peggy, dtr   Meds reviewed with patient/caregiver? Yes   Is the patient having any side effects they believe may be caused by any medication additions or changes? No   Does the patient have all medications ordered at discharge? Yes   Is the patient taking all medications as directed (includes completed medication regime)? Yes   Does the patient have a primary care provider?  Yes   Does the patient have an appointment with their PCP within 7 days of discharge? No   Nursing Interventions Advised patient to make appointment   Has the patient kept scheduled appointments due by today? N/A   The Stroke [facility] at [facility] requests you follow up with them within 30 days for important follow up care. Please call [phone] to schedule this appointment. Thank you. Yes   What is the Home health agency?  [facility]   Has home health visited the patient within 72 hours of discharge? Call prior to 72 hours   What DME was ordered? rolling walker from [facility]   Has all DME been delivered? Yes   Psychosocial issues? No   Does the patient require any assistance with activities of daily living such as eating, bathing, dressing, walking, etc.? No   Does the patient have any residual symptoms from stroke/TIA? No   Did the patient receive a copy of their discharge instructions? Yes   Nursing interventions Reviewed instructions with patient  [with dtr]   What is the patient's perception of their health status since discharge? Improving  [Dtr reporrts pt is doing well and has no residual effects and has returned to her full abilities,  no questions or  concerns noted today]   Nursing interventions Nurse provided patient education   Is the patient/caregiver able to teach back the risk factors for a stroke? High blood pressure-goal below 120/80, High Cholesterol, History of TIAs   Is the patient/caregiver able to teach back signs and symptoms related to disease process for when to call PCP? Yes   Is the patient/caregiver able to teach back signs and symptoms related to disease process for when to call 911? Yes   Is the patient able to teach back FAST for Stroke? 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 Yes   Call end time 0928            LILLIAN LAKE - Registered Nurse

## 2023-12-11 LAB — GLUCOSE BLDC GLUCOMTR-MCNC: 105 MG/DL (ref 70–130)

## 2023-12-13 ENCOUNTER — READMISSION MANAGEMENT (OUTPATIENT)
Dept: CALL CENTER | Facility: HOSPITAL | Age: 78
End: 2023-12-13
Payer: MEDICARE

## 2023-12-13 NOTE — OUTREACH NOTE
Stroke Week 2 Survey      Flowsheet Row Responses   [facility] patient discharged from? [place]   Does the patient have one of the following disease processes/diagnoses(primary or secondary)? Stroke   Week 2 attempt successful? No   Unsuccessful attempts Attempt 1  [Reached daughter Rosalina, but she was at work and unable to talk today.]            BENITO PHILLIPS - Registered Nurse

## 2024-02-22 ENCOUNTER — CALL CENTER PROGRAMS (OUTPATIENT)
Dept: CALL CENTER | Facility: HOSPITAL | Age: 79
End: 2024-02-22
Payer: MEDICARE

## 2024-02-22 NOTE — OUTREACH NOTE
Stroke [place] Survey      Flowsheet Row Responses   Facility patient discharged from? [facility]   Attempt successful Yes   Call start time 1404   Person spoke with today (if not patient) and relationship Caregiver  [daughter, Peggy Al]   Call end time 1410   Patient location 30 days post discharge if known Home   Was the patient readmitted within 30 days of discharge? No   Could you live alone without any help from another person? Yes  [Independent with ADL's]   Can you do everything that you were doing right before your stroke even if slower and not as much? Yes   Are you completely back to the way you were right before your stroke? No  [Daughter reports patient exhibits some decline in short term memory since that episode.]   Can you walk from one room to another without help from another person? Yes  [Walks independently]   Can the patient sit up in bed without any help? Yes   Call Center Renee Score 1   [place] score call completed Yes   Comments Daughter reports physically patient recovered well, but has shown decline in her short term memory.            BENITO PHILLIPS - Registered Nurse